# Patient Record
Sex: MALE | Race: WHITE | NOT HISPANIC OR LATINO | Employment: FULL TIME | URBAN - METROPOLITAN AREA
[De-identification: names, ages, dates, MRNs, and addresses within clinical notes are randomized per-mention and may not be internally consistent; named-entity substitution may affect disease eponyms.]

---

## 2017-02-09 ENCOUNTER — OFFICE VISIT (OUTPATIENT)
Dept: INTERNAL MEDICINE | Facility: CLINIC | Age: 57
End: 2017-02-09

## 2017-02-09 VITALS
HEART RATE: 74 BPM | SYSTOLIC BLOOD PRESSURE: 128 MMHG | DIASTOLIC BLOOD PRESSURE: 80 MMHG | BODY MASS INDEX: 25.71 KG/M2 | HEIGHT: 73 IN | OXYGEN SATURATION: 98 % | WEIGHT: 194 LBS

## 2017-02-09 DIAGNOSIS — N52.8 OTHER MALE ERECTILE DYSFUNCTION: ICD-10-CM

## 2017-02-09 DIAGNOSIS — Z00.00 ANNUAL PHYSICAL EXAM: ICD-10-CM

## 2017-02-09 DIAGNOSIS — Z12.11 SCREEN FOR COLON CANCER: ICD-10-CM

## 2017-02-09 DIAGNOSIS — E78.2 MIXED HYPERLIPIDEMIA: ICD-10-CM

## 2017-02-09 DIAGNOSIS — E11.9 DIABETES MELLITUS WITHOUT COMPLICATION (HCC): Primary | ICD-10-CM

## 2017-02-09 LAB
ALBUMIN SERPL-MCNC: 4.9 G/DL (ref 3.2–4.8)
ALBUMIN/GLOB SERPL: 1.8 G/DL (ref 1.5–2.5)
ALP SERPL-CCNC: 112 U/L (ref 25–100)
ALT SERPL W P-5'-P-CCNC: 57 U/L (ref 7–40)
ANION GAP SERPL CALCULATED.3IONS-SCNC: 5 MMOL/L (ref 3–11)
ARTICHOKE IGE QN: 127 MG/DL (ref 0–130)
AST SERPL-CCNC: 45 U/L (ref 0–33)
BILIRUB BLD-MCNC: NEGATIVE MG/DL
BILIRUB SERPL-MCNC: 0.8 MG/DL (ref 0.3–1.2)
BUN BLD-MCNC: 14 MG/DL (ref 9–23)
BUN/CREAT SERPL: 14 (ref 7–25)
CALCIUM SPEC-SCNC: 10.7 MG/DL (ref 8.7–10.4)
CHLORIDE SERPL-SCNC: 102 MMOL/L (ref 99–109)
CHOLEST SERPL-MCNC: 200 MG/DL (ref 0–200)
CLARITY, POC: CLEAR
CO2 SERPL-SCNC: 32 MMOL/L (ref 20–31)
COLOR UR: YELLOW
CREAT BLD-MCNC: 1 MG/DL (ref 0.6–1.3)
DEPRECATED RDW RBC AUTO: 41 FL (ref 37–54)
ERYTHROCYTE [DISTWIDTH] IN BLOOD BY AUTOMATED COUNT: 12.3 % (ref 11.3–14.5)
GFR SERPL CREATININE-BSD FRML MDRD: 77 ML/MIN/1.73
GLOBULIN UR ELPH-MCNC: 2.8 GM/DL
GLUCOSE BLD-MCNC: 166 MG/DL (ref 70–100)
GLUCOSE BLDC GLUCOMTR-MCNC: 182 MG/DL (ref 70–130)
GLUCOSE UR STRIP-MCNC: ABNORMAL MG/DL
HBA1C MFR BLD: 8.9 %
HCT VFR BLD AUTO: 48.9 % (ref 38.9–50.9)
HCV AB SER DONR QL: NORMAL
HDLC SERPL-MCNC: 50 MG/DL (ref 40–60)
HGB BLD-MCNC: 16.6 G/DL (ref 13.1–17.5)
KETONES UR QL: NEGATIVE
LEUKOCYTE EST, POC: NEGATIVE
MCH RBC QN AUTO: 30.9 PG (ref 27–31)
MCHC RBC AUTO-ENTMCNC: 33.9 G/DL (ref 32–36)
MCV RBC AUTO: 91.1 FL (ref 80–99)
NITRITE UR-MCNC: NEGATIVE MG/ML
PH UR: 6 [PH] (ref 5–8)
PLATELET # BLD AUTO: 262 10*3/MM3 (ref 150–450)
PMV BLD AUTO: 10.1 FL (ref 6–12)
POTASSIUM BLD-SCNC: 4.7 MMOL/L (ref 3.5–5.5)
PROT SERPL-MCNC: 7.7 G/DL (ref 5.7–8.2)
PROT UR STRIP-MCNC: NEGATIVE MG/DL
PSA SERPL-MCNC: 0.7 NG/ML (ref 0–4)
RBC # BLD AUTO: 5.37 10*6/MM3 (ref 4.2–5.76)
RBC # UR STRIP: NEGATIVE /UL
SODIUM BLD-SCNC: 139 MMOL/L (ref 132–146)
SP GR UR: 1.03 (ref 1–1.03)
TRIGL SERPL-MCNC: 211 MG/DL (ref 0–150)
TSH SERPL DL<=0.05 MIU/L-ACNC: 1.36 MIU/ML (ref 0.35–5.35)
UROBILINOGEN UR QL: NORMAL
WBC NRBC COR # BLD: 8.58 10*3/MM3 (ref 3.5–10.8)

## 2017-02-09 PROCEDURE — 81003 URINALYSIS AUTO W/O SCOPE: CPT | Performed by: HOSPITALIST

## 2017-02-09 PROCEDURE — 85027 COMPLETE CBC AUTOMATED: CPT | Performed by: HOSPITALIST

## 2017-02-09 PROCEDURE — G0103 PSA SCREENING: HCPCS | Performed by: HOSPITALIST

## 2017-02-09 PROCEDURE — 99396 PREV VISIT EST AGE 40-64: CPT | Performed by: HOSPITALIST

## 2017-02-09 PROCEDURE — 83036 HEMOGLOBIN GLYCOSYLATED A1C: CPT | Performed by: HOSPITALIST

## 2017-02-09 PROCEDURE — 84443 ASSAY THYROID STIM HORMONE: CPT | Performed by: HOSPITALIST

## 2017-02-09 PROCEDURE — 80061 LIPID PANEL: CPT | Performed by: HOSPITALIST

## 2017-02-09 PROCEDURE — 90471 IMMUNIZATION ADMIN: CPT | Performed by: HOSPITALIST

## 2017-02-09 PROCEDURE — 84402 ASSAY OF FREE TESTOSTERONE: CPT | Performed by: HOSPITALIST

## 2017-02-09 PROCEDURE — 90472 IMMUNIZATION ADMIN EACH ADD: CPT | Performed by: HOSPITALIST

## 2017-02-09 PROCEDURE — 86803 HEPATITIS C AB TEST: CPT | Performed by: HOSPITALIST

## 2017-02-09 PROCEDURE — 90732 PPSV23 VACC 2 YRS+ SUBQ/IM: CPT | Performed by: HOSPITALIST

## 2017-02-09 PROCEDURE — 84403 ASSAY OF TOTAL TESTOSTERONE: CPT | Performed by: HOSPITALIST

## 2017-02-09 PROCEDURE — 90715 TDAP VACCINE 7 YRS/> IM: CPT | Performed by: HOSPITALIST

## 2017-02-09 PROCEDURE — 82947 ASSAY GLUCOSE BLOOD QUANT: CPT | Performed by: HOSPITALIST

## 2017-02-09 PROCEDURE — 80053 COMPREHEN METABOLIC PANEL: CPT | Performed by: HOSPITALIST

## 2017-02-09 RX ORDER — PIOGLITAZONEHYDROCHLORIDE 30 MG/1
30 TABLET ORAL DAILY
Qty: 30 TABLET | Refills: 5 | Status: SHIPPED | OUTPATIENT
Start: 2017-02-09 | End: 2017-12-21 | Stop reason: SDUPTHER

## 2017-02-09 RX ORDER — GLIPIZIDE 10 MG/1
10 TABLET, FILM COATED, EXTENDED RELEASE ORAL DAILY
Qty: 30 TABLET | Refills: 11 | Status: SHIPPED | OUTPATIENT
Start: 2017-02-09 | End: 2018-04-02 | Stop reason: SDUPTHER

## 2017-02-09 NOTE — PROGRESS NOTES
Patient Care Team:  Karyn Quiñones MD as PCP - General    Ceasar Hawk 56 y.o. male who presents for an Annual Physical.  Ceasar Hawk has a history of   Patient Active Problem List   Diagnosis   • Diabetes mellitus without complication   • Mixed hyperlipidemia   .  Ceasar Hawk has been doing well without new interval problems. Plan to update vaccines if needed today. Also followed up on his diabetes.    Subjective   Last visit he was prescribed Actos but it apparently never got filled. He was not aware of why. He is about the same. He has waxing an waning energy levels. He is a little low right now. He also reports some erectile dysfunction. Denies neuropathy symptoms. He has not gotten a lot of time to exercise but he hasn't been eating as much and his weight is down. He endorses a mild to mod amout of stress. Denies sleep disturbances, chest pain or shortness of breath. He wakes up with a sinus headaches at times but not in the last few days.     Review of Systems   Pertinent items are noted in HPI, all other systems reviewed and negative    History  History reviewed. No pertinent past medical history.    Objective     Vital Signs  Vitals:    02/09/17 0751   BP: 128/80   Pulse: 74   SpO2: 98%         Physical Exam:      General Appearance:    Alert, cooperative, in no acute distress   Head:    Normocephalic, without obvious abnormality, atraumatic   Eyes:            Lids and lashes normal, conjunctivae and sclerae normal, no   icterus, no pallor, corneas clear, PERRLA   Ears:    Ears appear intact with no abnormalities noted   Throat:   No oral lesions, no thrush, oral mucosa moist   Neck:   No adenopathy, supple, trachea midline, no thyromegaly, no   carotid bruit, no JVD   Back:     No kyphosis present, no scoliosis present, no skin lesions,      erythema or scars, no tenderness to percussion or                   palpation,   range of motion normal   Lungs:     Clear to auscultation,respirations regular,  even and                  unlabored    Heart:    Regular rhythm and normal rate, normal S1 and S2, no            murmur, no gallop, no rub, no click   Chest Wall:    No abnormalities observed   Abdomen:     Normal bowel sounds, no masses, no organomegaly, soft        non-tender, non-distended, no guarding, no rebound                tenderness   Rectal:    prostate normal , non-tender, heme + stool   Extremities:   Moves all extremities well, no edema, no cyanosis, no             redness   Pulses:   Pulses palpable and equal bilaterally   Skin:   No bleeding, bruising or rash   Lymph nodes:   No palpable adenopathy   Neurologic:   Cranial nerves 2 - 12 grossly intact, sensation intact, DTR       present and equal bilaterally     Current Outpatient Prescriptions:   •  glipiZIDE (GLUCOTROL) 10 MG 24 hr tablet, Take 1 tablet by mouth Daily., Disp: 30 tablet, Rfl: 11  •  Linagliptin-Metformin HCl (JENTADUETO) 2.5-1000 MG tablet, Take 1 tablet by mouth 2 (Two) Times a Day., Disp: 60 tablet, Rfl: 11  •  lovastatin (MEVACOR) 40 MG tablet, Take 1 tablet by mouth Every Night., Disp: 30 tablet, Rfl: 2  •  pioglitazone (ACTOS) 30 MG tablet, Take 1 tablet by mouth Daily., Disp: 30 tablet, Rfl: 5     Results Review:    I reviewed the patient's new clinical results:  Lab Results (most recent)     Procedure Component Value Units Date/Time    POC Glucose Fingerstick [[id number]]  (Abnormal) Collected:  02/09/17 0821    Specimen:  Blood Updated:  02/09/17 0821     Glucose 182 (A) mg/dL     POC Glycosylated Hemoglobin (Hb A1C) [[id number]] Collected:  02/09/17 0821    Specimen:  Blood Updated:  02/09/17 0821     Hemoglobin A1C 8.9 %     POC Urinalysis Dipstick, Automated [[id number]]  (Abnormal) Collected:  02/09/17 0828    Specimen:  Urine Updated:  02/09/17 0829     Color Yellow      Clarity, UA Clear      Glucose, UA 50 mg/dL (A) mg/dL      Bilirubin Negative      Ketones, UA Negative      Specific Gravity  1.030      Blood, UA Negative       pH, Urine 6.0      Protein, POC Negative mg/dL      Urobilinogen, UA Normal      Leukocytes Negative      Nitrite, UA Negative             Ceasar was seen today for annual exam and diabetes mellitus without complication.    Diagnoses and all orders for this visit:    Diabetes mellitus without complication  -     POC Glycosylated Hemoglobin (Hb A1C)  -     POC Glucose Fingerstick  -     POC Urinalysis Dipstick, Automated  -     Comprehensive Metabolic Panel  -     pioglitazone (ACTOS) 30 MG tablet; Take 1 tablet by mouth Daily.  -     Linagliptin-Metformin HCl (JENTADUETO) 2.5-1000 MG tablet; Take 1 tablet by mouth 2 (Two) Times a Day.  -     glipiZIDE (GLUCOTROL) 10 MG 24 hr tablet; Take 1 tablet by mouth Daily.  -     POC Microalbumin    Mixed hyperlipidemia  -     Lipid Panel  -     TSH    Annual physical exam  -     CBC (No Diff)  -     PSA Screen  -     Cancel: Cologuard  -     Hepatitis C Antibody  -     Tdap Vaccine Greater Than or Equal To 6yo IM  -     Pneumococcal Polysaccharide Vaccine 23-Valent Greater Than or Equal To 3yo Subcutaneous / IM    Other male erectile dysfunction  -     Testosterone, Free, Total    Screen for colon cancer  -     Ambulatory Referral For Screening Colonoscopy    He was counseled on exercise and diet, stress management    Patient has declined colonoscopy many times in the past but since his rectal exam is heme positive today he has agreed to under colonoscopy.      I discussed the patients findings and my recommendations with patient.

## 2017-02-10 LAB
CONV COMMENT: NORMAL
TESTOST FREE SERPL-MCNC: 8.8 PG/ML (ref 7.2–24)
TESTOST SERPL-MCNC: 427 NG/DL (ref 348–1197)

## 2017-03-10 RX ORDER — LOVASTATIN 40 MG/1
TABLET ORAL
Qty: 30 TABLET | Refills: 2 | Status: SHIPPED | OUTPATIENT
Start: 2017-03-10 | End: 2017-06-08 | Stop reason: SDUPTHER

## 2017-06-08 RX ORDER — LOVASTATIN 40 MG/1
TABLET ORAL
Qty: 30 TABLET | Refills: 2 | Status: SHIPPED | OUTPATIENT
Start: 2017-06-08 | End: 2017-09-10 | Stop reason: SDUPTHER

## 2017-06-22 ENCOUNTER — OFFICE VISIT (OUTPATIENT)
Dept: INTERNAL MEDICINE | Facility: CLINIC | Age: 57
End: 2017-06-22

## 2017-06-22 VITALS
SYSTOLIC BLOOD PRESSURE: 124 MMHG | WEIGHT: 194 LBS | DIASTOLIC BLOOD PRESSURE: 80 MMHG | HEART RATE: 90 BPM | BODY MASS INDEX: 25.6 KG/M2 | OXYGEN SATURATION: 98 %

## 2017-06-22 DIAGNOSIS — E11.9 DIABETES MELLITUS WITHOUT COMPLICATION (HCC): Primary | ICD-10-CM

## 2017-06-22 LAB
GLUCOSE BLDC GLUCOMTR-MCNC: 96 MG/DL (ref 70–130)
HBA1C MFR BLD: 6.9 %

## 2017-06-22 PROCEDURE — 99213 OFFICE O/P EST LOW 20 MIN: CPT | Performed by: NURSE PRACTITIONER

## 2017-06-22 PROCEDURE — 82947 ASSAY GLUCOSE BLOOD QUANT: CPT | Performed by: NURSE PRACTITIONER

## 2017-06-22 PROCEDURE — 83036 HEMOGLOBIN GLYCOSYLATED A1C: CPT | Performed by: NURSE PRACTITIONER

## 2017-06-22 NOTE — PROGRESS NOTES
Subjective  Follow-up (Type 2 DM )      Ceasar Hawk is a 56 y.o. male.   No Known Allergies  History of Present Illness      Here for diabetes follow up, he does take his glucose qd, he walks a lot at work but has no exercise program , does still eat sweets daily  The following portions of the patient's history were reviewed and updated as appropriate: allergies, current medications, past family history, past medical history, past social history, past surgical history and problem list.    Review of Systems   Genitourinary: Negative.    Musculoskeletal: Negative.    Skin: Negative.    All other systems reviewed and are negative.      Objective   Physical Exam   Constitutional: He is oriented to person, place, and time. He appears well-developed.   HENT:   Head: Normocephalic.   Eyes: Pupils are equal, round, and reactive to light.   Cardiovascular: Normal rate, regular rhythm and normal heart sounds.    Pulmonary/Chest: Effort normal and breath sounds normal.   Neurological: He is alert and oriented to person, place, and time.   Skin: Skin is warm and dry.   Psychiatric: He has a normal mood and affect. His behavior is normal. Judgment and thought content normal.     /80  Pulse 90  Wt 194 lb (88 kg)  SpO2 98%  BMI 25.6 kg/m2    Assessment/Plan     Problem List Items Addressed This Visit        Endocrine    Diabetes mellitus without complication - Primary    Relevant Orders    POC Glycosylated Hemoglobin (Hb A1C) (Completed)    POCT Glucose (Completed)          Results for orders placed or performed in visit on 06/22/17   POC Glycosylated Hemoglobin (Hb A1C)   Result Value Ref Range    Hemoglobin A1C 6.9 %   POCT Glucose   Result Value Ref Range    Glucose 96 70 - 130 mg/dL   his a1c is down from last visit , he is to decrease sweet intake

## 2017-08-09 DIAGNOSIS — E11.9 DIABETES MELLITUS WITHOUT COMPLICATION (HCC): ICD-10-CM

## 2017-08-10 RX ORDER — PIOGLITAZONEHYDROCHLORIDE 30 MG/1
TABLET ORAL
Qty: 30 TABLET | Refills: 5 | OUTPATIENT
Start: 2017-08-10

## 2017-09-10 RX ORDER — LOVASTATIN 40 MG/1
TABLET ORAL
Qty: 30 TABLET | Refills: 2 | Status: SHIPPED | OUTPATIENT
Start: 2017-09-10 | End: 2017-12-12 | Stop reason: SDUPTHER

## 2017-09-22 ENCOUNTER — OFFICE VISIT (OUTPATIENT)
Dept: INTERNAL MEDICINE | Facility: CLINIC | Age: 57
End: 2017-09-22

## 2017-09-22 VITALS
DIASTOLIC BLOOD PRESSURE: 82 MMHG | HEART RATE: 57 BPM | SYSTOLIC BLOOD PRESSURE: 126 MMHG | WEIGHT: 209 LBS | BODY MASS INDEX: 27.57 KG/M2 | OXYGEN SATURATION: 98 %

## 2017-09-22 DIAGNOSIS — E11.9 DIABETES MELLITUS WITHOUT COMPLICATION (HCC): Primary | ICD-10-CM

## 2017-09-22 LAB
GLUCOSE BLDC GLUCOMTR-MCNC: 131 MG/DL (ref 70–130)
HBA1C MFR BLD: 7.1 %

## 2017-09-22 PROCEDURE — 83036 HEMOGLOBIN GLYCOSYLATED A1C: CPT | Performed by: NURSE PRACTITIONER

## 2017-09-22 PROCEDURE — 90686 IIV4 VACC NO PRSV 0.5 ML IM: CPT | Performed by: NURSE PRACTITIONER

## 2017-09-22 PROCEDURE — 99213 OFFICE O/P EST LOW 20 MIN: CPT | Performed by: NURSE PRACTITIONER

## 2017-09-22 PROCEDURE — 90471 IMMUNIZATION ADMIN: CPT | Performed by: NURSE PRACTITIONER

## 2017-09-22 PROCEDURE — 82947 ASSAY GLUCOSE BLOOD QUANT: CPT | Performed by: NURSE PRACTITIONER

## 2017-09-22 NOTE — ASSESSMENT & PLAN NOTE
Diabetes is stable.   Continue current treatment regimen.  Regular aerobic exercise.  Discussed foot care.  Diabetes will be reassessed in 3 months.

## 2017-09-22 NOTE — PROGRESS NOTES
Subjective  Follow-up (diabetes)      Ceasar Hawk is a 56 y.o. male.   No Known Allergies  History of Present Illness      Has not been taking meds as ordered, takes intermittently   The following portions of the patient's history were reviewed and updated as appropriate: allergies, past medical history and past surgical history.    Review of Systems   Constitutional: Negative for fatigue.   Respiratory: Negative for apnea, chest tightness and shortness of breath.    Cardiovascular: Negative for chest pain, palpitations and leg swelling.   Skin: Negative for color change.   Psychiatric/Behavioral: The patient is not nervous/anxious.    All other systems reviewed and are negative.      Objective   Physical Exam   Constitutional: He is oriented to person, place, and time. He appears well-developed.   HENT:   Head: Normocephalic.   Eyes: Pupils are equal, round, and reactive to light.   Neck: Neck supple. No thyromegaly present.   Cardiovascular: Normal rate, regular rhythm and normal heart sounds.    Pulmonary/Chest: Effort normal and breath sounds normal.   Lymphadenopathy:     He has no cervical adenopathy.   Neurological: He is alert and oriented to person, place, and time.   Skin: Skin is warm and dry.   Psychiatric: He has a normal mood and affect. His behavior is normal. Judgment and thought content normal.     /82  Pulse 57  Wt 209 lb (94.8 kg)  SpO2 98%  BMI 27.57 kg/m2    Assessment/Plan     Problem List Items Addressed This Visit        Endocrine    Diabetes mellitus without complication - Primary     Diabetes is stable.   Continue current treatment regimen.  Regular aerobic exercise.  Discussed foot care.  Diabetes will be reassessed in 3 months.         Relevant Orders    POCT Glucose (Completed)    POC Glycosylated Hemoglobin (Hb A1C) (Completed)          Results for orders placed or performed in visit on 09/22/17   POCT Glucose   Result Value Ref Range    Glucose 131 (A) 70 - 130 mg/dL   POC  Glycosylated Hemoglobin (Hb A1C)   Result Value Ref Range    Hemoglobin A1C 7.1 %

## 2017-11-16 ENCOUNTER — OFFICE VISIT (OUTPATIENT)
Dept: INTERNAL MEDICINE | Facility: CLINIC | Age: 57
End: 2017-11-16

## 2017-11-16 VITALS
OXYGEN SATURATION: 99 % | HEART RATE: 98 BPM | BODY MASS INDEX: 27.31 KG/M2 | SYSTOLIC BLOOD PRESSURE: 114 MMHG | WEIGHT: 207 LBS | DIASTOLIC BLOOD PRESSURE: 68 MMHG

## 2017-11-16 DIAGNOSIS — M77.8 TENDINITIS OF LEFT TRICEPS: Primary | ICD-10-CM

## 2017-11-16 PROCEDURE — 99213 OFFICE O/P EST LOW 20 MIN: CPT | Performed by: NURSE PRACTITIONER

## 2017-11-16 RX ORDER — PREDNISONE 20 MG/1
TABLET ORAL
Qty: 19 TABLET | Refills: 0 | Status: SHIPPED | OUTPATIENT
Start: 2017-11-16 | End: 2017-12-21

## 2017-11-16 NOTE — PROGRESS NOTES
Subjective  Left shoudler pain x3 weeks      Ceasar Hawk is a 56 y.o. male.   No Known Allergies  History of Present Illness      Pain in left shoulder x 3 weeks , worse with movement , came on suddenly , hurts most of the time,, shooting pain and an ache , has tried advil w/o relief   The following portions of the patient's history were reviewed and updated as appropriate: current medications, past social history and problem list.    Review of Systems   Musculoskeletal: Positive for myalgias.   All other systems reviewed and are negative.      Objective   Physical Exam   Constitutional: He is oriented to person, place, and time. He appears well-developed and well-nourished.   HENT:   Head: Normocephalic and atraumatic.   Eyes: Conjunctivae are normal.   Cardiovascular: Normal rate.    Pulmonary/Chest: Effort normal and breath sounds normal.   Musculoskeletal:        Left upper arm: He exhibits tenderness.   Neurological: He is alert and oriented to person, place, and time.   Skin: Skin is warm and dry.   Psychiatric: He has a normal mood and affect. His behavior is normal. Judgment and thought content normal.   Nursing note and vitals reviewed.    /68  Pulse 98  Wt 207 lb (93.9 kg)  SpO2 99%  BMI 27.31 kg/m2    Assessment/Plan     Problem List Items Addressed This Visit     None      Visit Diagnoses     Tendinitis of left triceps    -  Primary    Relevant Medications    predniSONE (DELTASONE) 20 MG tablet          rtc if no improvement

## 2017-12-12 RX ORDER — LOVASTATIN 40 MG/1
TABLET ORAL
Qty: 30 TABLET | Refills: 2 | Status: SHIPPED | OUTPATIENT
Start: 2017-12-12 | End: 2018-03-21 | Stop reason: SDUPTHER

## 2017-12-21 ENCOUNTER — OFFICE VISIT (OUTPATIENT)
Dept: INTERNAL MEDICINE | Facility: CLINIC | Age: 57
End: 2017-12-21

## 2017-12-21 VITALS
SYSTOLIC BLOOD PRESSURE: 110 MMHG | HEIGHT: 73 IN | OXYGEN SATURATION: 99 % | WEIGHT: 202.6 LBS | DIASTOLIC BLOOD PRESSURE: 66 MMHG | HEART RATE: 109 BPM | BODY MASS INDEX: 26.85 KG/M2

## 2017-12-21 DIAGNOSIS — E11.9 DIABETES MELLITUS WITHOUT COMPLICATION (HCC): ICD-10-CM

## 2017-12-21 LAB
GLUCOSE BLDC GLUCOMTR-MCNC: 265 MG/DL (ref 70–130)
HBA1C MFR BLD: 8.3 %

## 2017-12-21 PROCEDURE — 83036 HEMOGLOBIN GLYCOSYLATED A1C: CPT | Performed by: NURSE PRACTITIONER

## 2017-12-21 PROCEDURE — 99213 OFFICE O/P EST LOW 20 MIN: CPT | Performed by: NURSE PRACTITIONER

## 2017-12-21 PROCEDURE — 82962 GLUCOSE BLOOD TEST: CPT | Performed by: NURSE PRACTITIONER

## 2017-12-21 RX ORDER — PIOGLITAZONEHYDROCHLORIDE 30 MG/1
30 TABLET ORAL DAILY
Qty: 30 TABLET | Refills: 6 | Status: SHIPPED | OUTPATIENT
Start: 2017-12-21 | End: 2018-07-20 | Stop reason: SDUPTHER

## 2017-12-21 NOTE — PROGRESS NOTES
"Subjective  /66  Pulse 109  Ht 185.4 cm (73\")  Wt 91.9 kg (202 lb 9.6 oz)  SpO2 99%  BMI 26.73 kg/m2  Diabetes   Ceasar Hawk is a 56 y.o. male.   No Known Allergies  History of Present Illness      Joined a basketball league , is playing 3rd game tonight , it will go every tuesday and Thursday   Has been eating a lot of candy his mom and customers make for him   The following portions of the patient's history were reviewed and updated as appropriate: allergies, current medications, past medical history and problem list.    Review of Systems   Eyes: Negative.    Endocrine: Negative.    Genitourinary: Negative.    All other systems reviewed and are negative.      Objective   Physical Exam   Constitutional: He is oriented to person, place, and time. He appears well-developed and well-nourished.   HENT:   Head: Normocephalic and atraumatic.   Eyes: Conjunctivae are normal.   Neck: Neck supple. No thyromegaly present.   Cardiovascular: Normal rate and regular rhythm.    Pulmonary/Chest: Effort normal and breath sounds normal.   Lymphadenopathy:     He has no cervical adenopathy.   Neurological: He is alert and oriented to person, place, and time.   Skin: Skin is warm and dry.   Psychiatric: He has a normal mood and affect. His behavior is normal. Judgment and thought content normal.   Nursing note and vitals reviewed.      Assessment/Plan     Problem List Items Addressed This Visit        Endocrine    Diabetes mellitus without complication    Relevant Medications    pioglitazone (ACTOS) 30 MG tablet    Other Relevant Orders    POC Glycosylated Hemoglobin (Hb A1C) (Completed)    POC Glucose (Completed)        Educated about healthy lifestyle and food changes , states now that he is exercising again he will start eating healthier        Results for orders placed or performed in visit on 12/21/17   POC Glycosylated Hemoglobin (Hb A1C)   Result Value Ref Range    Hemoglobin A1C 8.3 %   POC Glucose   Result Value " Ref Range    Glucose 265 (A) 70 - 130 mg/dL

## 2018-03-21 ENCOUNTER — OFFICE VISIT (OUTPATIENT)
Dept: INTERNAL MEDICINE | Facility: CLINIC | Age: 58
End: 2018-03-21

## 2018-03-21 VITALS
HEART RATE: 95 BPM | SYSTOLIC BLOOD PRESSURE: 112 MMHG | OXYGEN SATURATION: 98 % | BODY MASS INDEX: 27.7 KG/M2 | WEIGHT: 209 LBS | HEIGHT: 73 IN | DIASTOLIC BLOOD PRESSURE: 72 MMHG

## 2018-03-21 DIAGNOSIS — Z00.00 ROUTINE ADULT HEALTH MAINTENANCE: Primary | ICD-10-CM

## 2018-03-21 DIAGNOSIS — Z12.5 ENCOUNTER FOR PROSTATE CANCER SCREENING: ICD-10-CM

## 2018-03-21 DIAGNOSIS — E11.9 DIABETES MELLITUS WITHOUT COMPLICATION (HCC): ICD-10-CM

## 2018-03-21 LAB
ALBUMIN SERPL-MCNC: 4.7 G/DL (ref 3.2–4.8)
ALBUMIN/GLOB SERPL: 1.7 G/DL (ref 1.5–2.5)
ALP SERPL-CCNC: 122 U/L (ref 25–100)
ALT SERPL W P-5'-P-CCNC: 58 U/L (ref 7–40)
ANION GAP SERPL CALCULATED.3IONS-SCNC: 12 MMOL/L (ref 3–11)
ARTICHOKE IGE QN: 144 MG/DL (ref 0–130)
AST SERPL-CCNC: 36 U/L (ref 0–33)
BILIRUB SERPL-MCNC: 0.8 MG/DL (ref 0.3–1.2)
BUN BLD-MCNC: 13 MG/DL (ref 9–23)
BUN/CREAT SERPL: 11.8 (ref 7–25)
CALCIUM SPEC-SCNC: 9.8 MG/DL (ref 8.7–10.4)
CHLORIDE SERPL-SCNC: 102 MMOL/L (ref 99–109)
CHOLEST SERPL-MCNC: 193 MG/DL (ref 0–200)
CO2 SERPL-SCNC: 28 MMOL/L (ref 20–31)
CREAT BLD-MCNC: 1.1 MG/DL (ref 0.6–1.3)
DEPRECATED RDW RBC AUTO: 41.5 FL (ref 37–54)
ERYTHROCYTE [DISTWIDTH] IN BLOOD BY AUTOMATED COUNT: 12.4 % (ref 11.3–14.5)
GFR SERPL CREATININE-BSD FRML MDRD: 69 ML/MIN/1.73
GLOBULIN UR ELPH-MCNC: 2.8 GM/DL
GLUCOSE BLD-MCNC: 142 MG/DL (ref 70–100)
GLUCOSE BLDC GLUCOMTR-MCNC: 134 MG/DL (ref 70–130)
HBA1C MFR BLD: 7.8 %
HCT VFR BLD AUTO: 48.6 % (ref 38.9–50.9)
HDLC SERPL-MCNC: 45 MG/DL (ref 40–60)
HGB BLD-MCNC: 16.3 G/DL (ref 13.1–17.5)
MCH RBC QN AUTO: 30.8 PG (ref 27–31)
MCHC RBC AUTO-ENTMCNC: 33.5 G/DL (ref 32–36)
MCV RBC AUTO: 91.7 FL (ref 80–99)
PLATELET # BLD AUTO: 264 10*3/MM3 (ref 150–450)
PMV BLD AUTO: 10.2 FL (ref 6–12)
POTASSIUM BLD-SCNC: 4.5 MMOL/L (ref 3.5–5.5)
PROT SERPL-MCNC: 7.5 G/DL (ref 5.7–8.2)
RBC # BLD AUTO: 5.3 10*6/MM3 (ref 4.2–5.76)
SODIUM BLD-SCNC: 142 MMOL/L (ref 132–146)
TRIGL SERPL-MCNC: 191 MG/DL (ref 0–150)
TSH SERPL DL<=0.05 MIU/L-ACNC: 0.99 MIU/ML (ref 0.35–5.35)
WBC NRBC COR # BLD: 8.41 10*3/MM3 (ref 3.5–10.8)

## 2018-03-21 PROCEDURE — 82043 UR ALBUMIN QUANTITATIVE: CPT | Performed by: NURSE PRACTITIONER

## 2018-03-21 PROCEDURE — 80061 LIPID PANEL: CPT | Performed by: NURSE PRACTITIONER

## 2018-03-21 PROCEDURE — 85027 COMPLETE CBC AUTOMATED: CPT | Performed by: NURSE PRACTITIONER

## 2018-03-21 PROCEDURE — 83036 HEMOGLOBIN GLYCOSYLATED A1C: CPT | Performed by: NURSE PRACTITIONER

## 2018-03-21 PROCEDURE — 80053 COMPREHEN METABOLIC PANEL: CPT | Performed by: NURSE PRACTITIONER

## 2018-03-21 PROCEDURE — 84443 ASSAY THYROID STIM HORMONE: CPT | Performed by: NURSE PRACTITIONER

## 2018-03-21 PROCEDURE — 99396 PREV VISIT EST AGE 40-64: CPT | Performed by: NURSE PRACTITIONER

## 2018-03-21 PROCEDURE — 82947 ASSAY GLUCOSE BLOOD QUANT: CPT | Performed by: NURSE PRACTITIONER

## 2018-03-21 PROCEDURE — 82570 ASSAY OF URINE CREATININE: CPT | Performed by: NURSE PRACTITIONER

## 2018-03-21 PROCEDURE — G0103 PSA SCREENING: HCPCS | Performed by: NURSE PRACTITIONER

## 2018-03-21 RX ORDER — LOVASTATIN 40 MG/1
TABLET ORAL
Qty: 30 TABLET | Refills: 2 | Status: SHIPPED | OUTPATIENT
Start: 2018-03-21 | End: 2018-04-02 | Stop reason: SDUPTHER

## 2018-03-21 NOTE — PROGRESS NOTES
Subjective  Annual Exam      Ceasar Hawk is a 57 y.o. male.   No Known Allergies  History of Present Illness      No complaints today, pt reports he does not always take his jentadueto , he takes if his b/s over 140  The following portions of the patient's history were reviewed and updated as appropriate: allergies, current medications, past surgical history and problem list.    Review of Systems   Eyes: Negative.    Respiratory: Negative.    Cardiovascular: Negative.    All other systems reviewed and are negative.      Objective   Physical Exam   Constitutional: He is oriented to person, place, and time. He appears well-developed and well-nourished. No distress.   HENT:   Head: Normocephalic and atraumatic. Hair is normal.   Right Ear: Hearing, tympanic membrane, external ear and ear canal normal.   Left Ear: Hearing, tympanic membrane, external ear and ear canal normal.   Nose: No sinus tenderness or nasal deformity.   Mouth/Throat: Uvula is midline, oropharynx is clear and moist and mucous membranes are normal. No oral lesions. No uvula swelling.   Eyes: Conjunctivae, EOM and lids are normal. Pupils are equal, round, and reactive to light. Right eye exhibits no discharge. Left eye exhibits no discharge. No scleral icterus. Right eye exhibits normal extraocular motion and no nystagmus. Left eye exhibits normal extraocular motion and no nystagmus.   Fundoscopic exam:       The right eye shows red reflex.        The left eye shows red reflex.   Neck: Normal range of motion. Neck supple. No JVD present. No tracheal deviation present. No thyromegaly present.   Cardiovascular: Normal rate, regular rhythm, normal heart sounds, intact distal pulses and normal pulses.  Exam reveals no gallop.    No murmur heard.  Pulmonary/Chest: Effort normal and breath sounds normal. No respiratory distress. He has no wheezes. He has no rales. He exhibits no tenderness.   Abdominal: Soft. Bowel sounds are normal. He exhibits no  "distension and no mass. There is no tenderness. There is no guarding. No hernia.   Genitourinary: Rectum normal, prostate normal and penis normal. Rectal exam shows guaiac negative stool. No penile tenderness.   Musculoskeletal: Normal range of motion. He exhibits no edema, tenderness or deformity.   Lymphadenopathy:     He has no cervical adenopathy.   Neurological: He is alert and oriented to person, place, and time. He has normal reflexes. He displays normal reflexes. No cranial nerve deficit. He exhibits normal muscle tone. Coordination normal.   Skin: Skin is warm and dry. No rash noted. He is not diaphoretic.   Psychiatric: He has a normal mood and affect. His behavior is normal. Judgment and thought content normal.   Nursing note and vitals reviewed.    /72   Pulse 95   Ht 185.4 cm (73\")   Wt 94.8 kg (209 lb)   SpO2 98%   BMI 27.57 kg/m²     Assessment/Plan     Problem List Items Addressed This Visit        Endocrine    Diabetes mellitus without complication    Relevant Orders    POC Glycosylated Hemoglobin (Hb A1C) (Completed)    POCT Glucose (Completed)      Other Visit Diagnoses     Routine adult health maintenance    -  Primary    Relevant Orders    CBC (No Diff)    Comprehensive metabolic panel    TSH    Lipid Panel    Microalbumin / Creatinine Urine Ratio - Urine, Clean Catch    Encounter for prostate cancer screening        Relevant Orders    PSA SCREENING              Counseled patient regarding multimodal approach with healthy nutrition, healthy sleep, regular physical activity, social activities, counseling, and medications.  Reviewed medication , we have talked about his a1c today and the need for him to take all of his meds as prescribed, pt agrees, will see him back in 3 mos, labs today, will review and send to pt with poc  "

## 2018-03-22 LAB
CREAT 24H UR-MCNC: 152.8 MG/DL
MICROALBUMIN UR-MCNC: 3.7 UG/ML
MICROALBUMIN/CREAT UR: 2.4 MG/G CREAT (ref 0–30)
PSA SERPL-MCNC: 0.88 NG/ML (ref 0–4)

## 2018-03-23 ENCOUNTER — TELEPHONE (OUTPATIENT)
Dept: INTERNAL MEDICINE | Facility: CLINIC | Age: 58
End: 2018-03-23

## 2018-04-02 DIAGNOSIS — E11.9 DIABETES MELLITUS WITHOUT COMPLICATION (HCC): ICD-10-CM

## 2018-04-02 RX ORDER — LOVASTATIN 40 MG/1
40 TABLET ORAL
Qty: 30 TABLET | Refills: 2 | Status: SHIPPED | OUTPATIENT
Start: 2018-04-02 | End: 2018-09-24 | Stop reason: SDUPTHER

## 2018-04-03 RX ORDER — GLIPIZIDE 10 MG/1
10 TABLET, FILM COATED, EXTENDED RELEASE ORAL DAILY
Qty: 30 TABLET | Refills: 11 | Status: SHIPPED | OUTPATIENT
Start: 2018-04-03 | End: 2018-09-24 | Stop reason: DRUGHIGH

## 2018-04-05 DIAGNOSIS — E11.9 DIABETES MELLITUS WITHOUT COMPLICATION (HCC): ICD-10-CM

## 2018-04-09 NOTE — TELEPHONE ENCOUNTER
AFTER SPEAKING WITH HIS PHARMACY, THE PATIENT WAS ADVISED TO CALL THIS OFFICE TO REQUEST MED REFILL FOR JENTADUETO. HE STATES THAT WITH [facility], THERE ARE ISSUES WITH SOME RX GOING THROUIGH BUT A PHONE CALL TO THE PHARMACY SHOULD SUFFICE.

## 2018-06-22 ENCOUNTER — OFFICE VISIT (OUTPATIENT)
Dept: INTERNAL MEDICINE | Facility: CLINIC | Age: 58
End: 2018-06-22

## 2018-06-22 ENCOUNTER — RESULTS ENCOUNTER (OUTPATIENT)
Dept: INTERNAL MEDICINE | Facility: CLINIC | Age: 58
End: 2018-06-22

## 2018-06-22 VITALS
BODY MASS INDEX: 27.83 KG/M2 | DIASTOLIC BLOOD PRESSURE: 76 MMHG | HEIGHT: 73 IN | OXYGEN SATURATION: 97 % | SYSTOLIC BLOOD PRESSURE: 118 MMHG | WEIGHT: 210 LBS | HEART RATE: 79 BPM

## 2018-06-22 DIAGNOSIS — Z12.11 SCREENING FOR COLON CANCER: ICD-10-CM

## 2018-06-22 DIAGNOSIS — E11.9 DIABETES MELLITUS WITHOUT COMPLICATION (HCC): Primary | ICD-10-CM

## 2018-06-22 PROCEDURE — 99214 OFFICE O/P EST MOD 30 MIN: CPT | Performed by: NURSE PRACTITIONER

## 2018-06-22 PROCEDURE — 83036 HEMOGLOBIN GLYCOSYLATED A1C: CPT | Performed by: NURSE PRACTITIONER

## 2018-06-22 PROCEDURE — 82947 ASSAY GLUCOSE BLOOD QUANT: CPT | Performed by: NURSE PRACTITIONER

## 2018-06-22 NOTE — PROGRESS NOTES
"Subjective   Ceasar Hawk is a 57 y.o. male.   Chief Complaint   Patient presents with   • Follow-up   • Diabetes     type 2      History of Present Illness As above.  Denies any problems,  BG averages 120-130  Checks BG q 2-3 days.  No sores or wounds not healing.  Last eye exam 6-9 months ago.  Doing OK on diet.  Denies daily HA, ear pain, sore throat.  Has cough about this time each year RT sinus.  No CP, fast or irreg heartbeats, abd pain, heartburn, blood in stool.  No bladder issues.  Mood good.  No joint or muscle pain (recent shoulder pain-now resolved.  Sometimes will have leg cramps at night-relieved with standing.     The following portions of the patient's history were reviewed and updated as appropriate: allergies, current medications, past family history, past medical history, past social history, past surgical history and problem list.    Current Outpatient Prescriptions:   •  glipiZIDE (GLUCOTROL XL) 10 MG 24 hr tablet, Take 1 tablet by mouth Daily., Disp: 30 tablet, Rfl: 11  •  Linagliptin-Metformin HCl (JENTADUETO) 2.5-1000 MG tablet, Take 1 tablet by mouth 2 (Two) Times a Day., Disp: 60 tablet, Rfl: 11  •  lovastatin (MEVACOR) 40 MG tablet, Take 1 tablet by mouth every night at bedtime., Disp: 30 tablet, Rfl: 2  •  pioglitazone (ACTOS) 30 MG tablet, Take 1 tablet by mouth Daily., Disp: 30 tablet, Rfl: 6    Review of Systems Consitutional, HEENT, Respiratory, CV, GI, , Skin, Musculoskeletal, Neuro-mental, Endocrinological, Hematological were reviewed.  Positives were discussed in the HPI, otherwise ROS was negative   /76   Pulse 79   Ht 185.4 cm (73\")   Wt 95.3 kg (210 lb)   SpO2 97%   BMI 27.71 kg/m²     Objective   No Known Allergies    Physical Exam   Constitutional: He is oriented to person, place, and time. He appears well-developed and well-nourished. No distress.   HENT:   Head: Normocephalic and atraumatic.   Right Ear: External ear normal.   Left Ear: External ear normal. "   Mouth/Throat: Oropharynx is clear and moist.   Eyes: Right eye exhibits no discharge. Left eye exhibits no discharge.   Neck: Neck supple.   No carotid bruit   Cardiovascular: Normal rate, regular rhythm, normal heart sounds and intact distal pulses.  Exam reveals no gallop and no friction rub.    No murmur heard.  Pulmonary/Chest: Effort normal and breath sounds normal. No respiratory distress.   Abdominal: Soft. There is no tenderness.   Musculoskeletal:   Gait steady   Lymphadenopathy:     He has no cervical adenopathy.   Neurological: He is alert and oriented to person, place, and time.   Skin: Skin is warm and dry.   Pink, no rash   Nursing note and vitals reviewed.      Procedures    Assessment/Plan   Ceasar was seen today for follow-up and diabetes.    Diagnoses and all orders for this visit:    Diabetes mellitus without complication  -     POC Glycosylated Hemoglobin (Hb A1C)  -     POC Glucose Fingerstick  -     Comprehensive metabolic panel; Future    Screening for colon cancer  -     Cologuard - Stool, Per Rectum; Future      Patient Instructions   Cont to do well.  Annual flu vaccine.  Cont FU with endo.  Consider adding ACE inhibitor at next visit for nephroprotection.  See you in 6 months, sooner if needed             Teresa Marcus, AMBAR

## 2018-06-23 NOTE — PATIENT INSTRUCTIONS
Cont to do well.  Annual flu vaccine.  Cont FU with endo.  Consider adding ACE inhibitor at next visit for nephroprotection.  See you in 6 months, sooner if needed

## 2018-06-25 LAB
GLUCOSE BLDC GLUCOMTR-MCNC: 206 MG/DL (ref 70–130)
HBA1C MFR BLD: 6.5 %

## 2018-07-20 DIAGNOSIS — E11.9 DIABETES MELLITUS WITHOUT COMPLICATION (HCC): ICD-10-CM

## 2018-07-20 RX ORDER — PIOGLITAZONEHYDROCHLORIDE 30 MG/1
TABLET ORAL
Qty: 30 TABLET | Refills: 6 | Status: CANCELLED | OUTPATIENT
Start: 2018-07-20

## 2018-07-20 RX ORDER — PIOGLITAZONEHYDROCHLORIDE 30 MG/1
30 TABLET ORAL DAILY
Qty: 30 TABLET | Refills: 6 | Status: SHIPPED | OUTPATIENT
Start: 2018-07-20 | End: 2018-09-24 | Stop reason: SDUPTHER

## 2018-09-21 ENCOUNTER — OFFICE VISIT (OUTPATIENT)
Dept: INTERNAL MEDICINE | Facility: CLINIC | Age: 58
End: 2018-09-21

## 2018-09-21 VITALS
OXYGEN SATURATION: 99 % | HEART RATE: 77 BPM | WEIGHT: 205 LBS | BODY MASS INDEX: 27.17 KG/M2 | HEIGHT: 73 IN | DIASTOLIC BLOOD PRESSURE: 74 MMHG | SYSTOLIC BLOOD PRESSURE: 118 MMHG

## 2018-09-21 DIAGNOSIS — E78.5 HYPERLIPIDEMIA, UNSPECIFIED HYPERLIPIDEMIA TYPE: ICD-10-CM

## 2018-09-21 DIAGNOSIS — E11.9 DIABETES MELLITUS WITHOUT COMPLICATION (HCC): Primary | ICD-10-CM

## 2018-09-21 LAB
ALBUMIN SERPL-MCNC: 4.91 G/DL (ref 3.2–4.8)
ALBUMIN/GLOB SERPL: 2 G/DL (ref 1.5–2.5)
ALP SERPL-CCNC: 117 U/L (ref 25–100)
ALT SERPL W P-5'-P-CCNC: 58 U/L (ref 7–40)
ANION GAP SERPL CALCULATED.3IONS-SCNC: 14 MMOL/L (ref 3–11)
AST SERPL-CCNC: 44 U/L (ref 0–33)
BILIRUB SERPL-MCNC: 0.9 MG/DL (ref 0.3–1.2)
BUN BLD-MCNC: 14 MG/DL (ref 9–23)
BUN/CREAT SERPL: 13.1 (ref 7–25)
CALCIUM SPEC-SCNC: 10.1 MG/DL (ref 8.7–10.4)
CHLORIDE SERPL-SCNC: 103 MMOL/L (ref 99–109)
CO2 SERPL-SCNC: 26 MMOL/L (ref 20–31)
CREAT BLD-MCNC: 1.07 MG/DL (ref 0.6–1.3)
GFR SERPL CREATININE-BSD FRML MDRD: 71 ML/MIN/1.73
GLOBULIN UR ELPH-MCNC: 2.5 GM/DL
GLUCOSE BLD-MCNC: 101 MG/DL (ref 70–100)
HBA1C MFR BLD: 7.3 %
POTASSIUM BLD-SCNC: 5 MMOL/L (ref 3.5–5.5)
PROT SERPL-MCNC: 7.4 G/DL (ref 5.7–8.2)
SODIUM BLD-SCNC: 143 MMOL/L (ref 132–146)

## 2018-09-21 PROCEDURE — 80053 COMPREHEN METABOLIC PANEL: CPT | Performed by: NURSE PRACTITIONER

## 2018-09-21 PROCEDURE — 83036 HEMOGLOBIN GLYCOSYLATED A1C: CPT | Performed by: NURSE PRACTITIONER

## 2018-09-21 PROCEDURE — 99214 OFFICE O/P EST MOD 30 MIN: CPT | Performed by: NURSE PRACTITIONER

## 2018-09-21 NOTE — PROGRESS NOTES
"Subjective   Ceasar Hawk is a 57 y.o. male.   Chief Complaint   Patient presents with   • Follow-up     3 month   • Diabetes      History of Present Illness Patient denies fever chills, headache, ear pain, sore throat, shortness of air, cough, chest pain, abdominal pain, nausea vomiting diarrhea, dysuria, blood in stool or urine. Mood is good.  Eating and drinking as usual.  Stays tired.  No sores or wounds not healing.  Checks BG 1-2 times daily .  Not taking Jentadueto consistently  related to hypoglycemia.  He reports his blood sugars are staying less than 150.  Denies sores or wounds that will not heal.    The following portions of the patient's history were reviewed and updated as appropriate: allergies, current medications, past family history, past medical history, past social history, past surgical history and problem list.    Current Outpatient Prescriptions:   •  Linagliptin-Metformin HCl (JENTADUETO) 2.5-1000 MG tablet, Take 1 tablet by mouth 2 (Two) Times a Day., Disp: 60 tablet, Rfl: 2  •  lovastatin (MEVACOR) 40 MG tablet, Take 1 tablet by mouth every night at bedtime., Disp: 30 tablet, Rfl: 2  •  glipiZIDE (GLUCOTROL XL) 5 MG ER tablet, Take 1 tablet by mouth Daily., Disp: 30 tablet, Rfl: 2  •  pioglitazone (ACTOS) 30 MG tablet, Take 1 tablet by mouth Daily., Disp: 30 tablet, Rfl: 2    Review of Systems Consitutional, HEENT, Respiratory, CV, GI, , Skin, Musculoskeletal, Neuro-mental, Endocrinological, Hematological were reviewed.  Positives were discussed in the HPI, otherwise ROS was negative   /74   Pulse 77   Ht 185.4 cm (73\")   Wt 93 kg (205 lb)   SpO2 99%   BMI 27.05 kg/m²     Objective   No Known Allergies    Physical Exam   Constitutional: He is oriented to person, place, and time. He appears well-developed and well-nourished. No distress.   HENT:   Head: Normocephalic.   Right Ear: External ear normal.   Left Ear: External ear normal.   Nose: Nose normal.   Mouth/Throat: " Oropharynx is clear and moist. No oropharyngeal exudate.   Eyes: Pupils are equal, round, and reactive to light. Right eye exhibits no discharge. Left eye exhibits no discharge. No scleral icterus.   Neck: Neck supple.   No carotid bruit   Cardiovascular: Normal rate, regular rhythm, normal heart sounds and intact distal pulses.  Exam reveals no gallop and no friction rub.    No murmur heard.  Pulmonary/Chest: Effort normal and breath sounds normal. No respiratory distress. He has no wheezes. He has no rales. He exhibits no tenderness.   Abdominal: Soft. Bowel sounds are normal. He exhibits no mass. There is no tenderness.   Musculoskeletal:   OCHOA well    Lymphadenopathy:     He has no cervical adenopathy.   Neurological: He is alert and oriented to person, place, and time.   Skin: Skin is warm and dry. Capillary refill takes less than 2 seconds.   Psychiatric: He has a normal mood and affect. His behavior is normal. Judgment and thought content normal.   Nursing note and vitals reviewed.      Procedures    Assessment/Plan   Ceasar was seen today for follow-up and diabetes.    Diagnoses and all orders for this visit:    Diabetes mellitus without complication (CMS/[facility])  -     POC Glycosylated Hemoglobin (Hb A1C)  -     Comprehensive metabolic panel  -     glipiZIDE (GLUCOTROL XL) 5 MG ER tablet; Take 1 tablet by mouth Daily.  -     Linagliptin-Metformin HCl (JENTADUETO) 2.5-1000 MG tablet; Take 1 tablet by mouth 2 (Two) Times a Day.  -     pioglitazone (ACTOS) 30 MG tablet; Take 1 tablet by mouth Daily.    Hyperlipidemia, unspecified hyperlipidemia type  -     lovastatin (MEVACOR) 40 MG tablet; Take 1 tablet by mouth every night at bedtime.    Other orders  -     Cancel: Comprehensive metabolic panel        Patient Instructions   Reminded of seen an eye exam.  Recommend decrease the Glucotrol XL 10 mg to one half tablet daily.  Resume the Jentadueto .  Continue other medications as discussed.  Laboratory studies as  discussed.  Return to the clinic in 3 months to assess response to decreasing the Glucotrol XL        AMBAR Flores

## 2018-09-24 DIAGNOSIS — R79.89 ELEVATED LFTS: Primary | ICD-10-CM

## 2018-09-24 RX ORDER — LOVASTATIN 40 MG/1
40 TABLET ORAL
Qty: 30 TABLET | Refills: 2 | Status: SHIPPED | OUTPATIENT
Start: 2018-09-24 | End: 2018-12-21 | Stop reason: SDUPTHER

## 2018-09-24 RX ORDER — PIOGLITAZONEHYDROCHLORIDE 30 MG/1
30 TABLET ORAL DAILY
Qty: 30 TABLET | Refills: 2 | Status: SHIPPED | OUTPATIENT
Start: 2018-09-24 | End: 2018-12-21

## 2018-09-24 RX ORDER — GLIPIZIDE 5 MG/1
5 TABLET, FILM COATED, EXTENDED RELEASE ORAL DAILY
Qty: 30 TABLET | Refills: 2 | Status: SHIPPED | OUTPATIENT
Start: 2018-09-24 | End: 2018-12-21 | Stop reason: SDUPTHER

## 2018-09-24 NOTE — PATIENT INSTRUCTIONS
Reminded of seen an eye exam.  Recommend decrease the Glucotrol XL 10 mg to one half tablet daily.  Resume the Jentadueto .  Continue other medications as discussed.  Laboratory studies as discussed.  Return to the clinic in 3 months to assess response to decreasing the Glucotrol XL

## 2018-12-21 ENCOUNTER — OFFICE VISIT (OUTPATIENT)
Dept: INTERNAL MEDICINE | Facility: CLINIC | Age: 58
End: 2018-12-21

## 2018-12-21 VITALS
HEIGHT: 73 IN | WEIGHT: 210.2 LBS | SYSTOLIC BLOOD PRESSURE: 104 MMHG | DIASTOLIC BLOOD PRESSURE: 64 MMHG | BODY MASS INDEX: 27.86 KG/M2 | TEMPERATURE: 97.9 F | HEART RATE: 76 BPM

## 2018-12-21 DIAGNOSIS — E11.9 DIABETES MELLITUS WITHOUT COMPLICATION (HCC): ICD-10-CM

## 2018-12-21 DIAGNOSIS — E78.5 HYPERLIPIDEMIA, UNSPECIFIED HYPERLIPIDEMIA TYPE: ICD-10-CM

## 2018-12-21 DIAGNOSIS — R74.8 ELEVATED LIVER ENZYMES: Primary | ICD-10-CM

## 2018-12-21 DIAGNOSIS — E11.65 TYPE 2 DIABETES MELLITUS WITH HYPERGLYCEMIA, WITHOUT LONG-TERM CURRENT USE OF INSULIN (HCC): ICD-10-CM

## 2018-12-21 LAB
ALBUMIN SERPL-MCNC: 4.48 G/DL (ref 3.2–4.8)
ALBUMIN/GLOB SERPL: 1.9 G/DL (ref 1.5–2.5)
ALP SERPL-CCNC: 96 U/L (ref 25–100)
ALT SERPL W P-5'-P-CCNC: 59 U/L (ref 7–40)
ANION GAP SERPL CALCULATED.3IONS-SCNC: 9 MMOL/L (ref 3–11)
AST SERPL-CCNC: 44 U/L (ref 0–33)
BILIRUB SERPL-MCNC: 0.9 MG/DL (ref 0.3–1.2)
BUN BLD-MCNC: 14 MG/DL (ref 9–23)
BUN/CREAT SERPL: 14.6 (ref 7–25)
CALCIUM SPEC-SCNC: 9.2 MG/DL (ref 8.7–10.4)
CHLORIDE SERPL-SCNC: 104 MMOL/L (ref 99–109)
CO2 SERPL-SCNC: 24 MMOL/L (ref 20–31)
CREAT BLD-MCNC: 0.96 MG/DL (ref 0.6–1.3)
GFR SERPL CREATININE-BSD FRML MDRD: 80 ML/MIN/1.73
GLOBULIN UR ELPH-MCNC: 2.3 GM/DL
GLUCOSE BLD-MCNC: 162 MG/DL (ref 70–100)
POTASSIUM BLD-SCNC: 4.6 MMOL/L (ref 3.5–5.5)
PROT SERPL-MCNC: 6.8 G/DL (ref 5.7–8.2)
SODIUM BLD-SCNC: 137 MMOL/L (ref 132–146)

## 2018-12-21 PROCEDURE — 90674 CCIIV4 VAC NO PRSV 0.5 ML IM: CPT | Performed by: NURSE PRACTITIONER

## 2018-12-21 PROCEDURE — 80053 COMPREHEN METABOLIC PANEL: CPT | Performed by: NURSE PRACTITIONER

## 2018-12-21 PROCEDURE — 99214 OFFICE O/P EST MOD 30 MIN: CPT | Performed by: NURSE PRACTITIONER

## 2018-12-21 PROCEDURE — 90471 IMMUNIZATION ADMIN: CPT | Performed by: NURSE PRACTITIONER

## 2018-12-21 RX ORDER — GLIPIZIDE 10 MG/1
10 TABLET, FILM COATED, EXTENDED RELEASE ORAL DAILY
Qty: 30 TABLET | Refills: 2 | Status: SHIPPED | OUTPATIENT
Start: 2018-12-21 | End: 2019-03-20 | Stop reason: SDUPTHER

## 2018-12-21 RX ORDER — LOVASTATIN 40 MG/1
40 TABLET ORAL
Qty: 30 TABLET | Refills: 2 | Status: SHIPPED | OUTPATIENT
Start: 2018-12-21 | End: 2019-06-21

## 2018-12-21 RX ORDER — GLIPIZIDE 5 MG/1
5 TABLET, FILM COATED, EXTENDED RELEASE ORAL DAILY
Qty: 30 TABLET | Refills: 2 | Status: SHIPPED | OUTPATIENT
Start: 2018-12-21 | End: 2018-12-21 | Stop reason: DRUGHIGH

## 2018-12-21 RX ORDER — LOVASTATIN 40 MG/1
40 TABLET ORAL
Qty: 30 TABLET | Refills: 2 | OUTPATIENT
Start: 2018-12-21

## 2018-12-21 NOTE — PROGRESS NOTES
"Subjective   Ceasar Hawk is a 58 y.o. male.   Chief Complaint   Patient presents with   • Follow-up      History of Present Illness Since last visit, has had increased work responsibilities and is not able to care (Exercise, dietary modifications)  for himself so well.  Patient denies fever chills.  Has headache once a week, ear pain, sore throat, shortness of air, cough, chest pain, abdominal pain, nausea, vomiting, diarrhea, dysuria, blood in stool or urine. Mood is good.  Eating and drinking as usual.  No unexplained weight loss or gain.  We have been holding Mevacor RT liver enzyme elevation.  Also, we had decreased Glipizide at last visit RT hypoglycemic episodes-no further since the decrease but HGBA1 C has gone up to 7.6     The following portions of the patient's history were reviewed and updated as appropriate: allergies, current medications, past family history, past medical history, past social history, past surgical history and problem list.    Current Outpatient Medications:   •  Linagliptin-Metformin HCl (JENTADUETO) 2.5-1000 MG tablet, Take 1 tablet by mouth 2 (Two) Times a Day., Disp: 60 tablet, Rfl: 2  •  lovastatin (MEVACOR) 40 MG tablet, Take 1 tablet by mouth every night at bedtime., Disp: 30 tablet, Rfl: 2  •  glipiZIDE (GLUCOTROL XL) 10 MG 24 hr tablet, Take 1 tablet by mouth Daily., Disp: 30 tablet, Rfl: 2    Review of Systems Consitutional, HEENT, Respiratory, CV, GI, , Skin, Musculoskeletal, Neuro-mental, Endocrinological, Hematological were reviewed.  Positives were discussed in the HPI, otherwise ROS was negative   /64   Pulse 76   Temp 97.9 °F (36.6 °C)   Ht 185.4 cm (73\")   Wt 95.3 kg (210 lb 3.2 oz)   BMI 27.73 kg/m²     Objective   No Known Allergies    Physical Exam   Constitutional: He is oriented to person, place, and time. He appears well-developed and well-nourished. No distress.   HENT:   Head: Normocephalic.   Right Ear: External ear normal.   Left Ear: External ear " normal.   Mouth/Throat: Oropharynx is clear and moist.   TM clear   Eyes: Right eye exhibits no discharge. Left eye exhibits no discharge.   Neck: Neck supple. No thyromegaly present.   No carotid bruit bilat   Cardiovascular: Normal rate, regular rhythm, normal heart sounds and intact distal pulses. Exam reveals no gallop and no friction rub.   No murmur heard.  Pulmonary/Chest: Effort normal and breath sounds normal. No stridor. No respiratory distress. He has no wheezes.   Abdominal: Soft. Bowel sounds are normal. He exhibits no mass. There is no tenderness.   No HSM   Musculoskeletal:   OCHOA well.  Gait upright and steady    Lymphadenopathy:     He has no cervical adenopathy.   Neurological: He is alert and oriented to person, place, and time.   Skin: Skin is warm and dry. Capillary refill takes less than 2 seconds.   Nursing note and vitals reviewed.      Procedures    LABS  Results for orders placed or performed in visit on 12/21/18   Comprehensive Metabolic Panel   Result Value Ref Range    Glucose 162 (H) 70 - 100 mg/dL    BUN 14 9 - 23 mg/dL    Creatinine 0.96 0.60 - 1.30 mg/dL    Sodium 137 132 - 146 mmol/L    Potassium 4.6 3.5 - 5.5 mmol/L    Chloride 104 99 - 109 mmol/L    CO2 24.0 20.0 - 31.0 mmol/L    Calcium 9.2 8.7 - 10.4 mg/dL    Total Protein 6.8 5.7 - 8.2 g/dL    Albumin 4.48 3.20 - 4.80 g/dL    ALT (SGPT) 59 (H) 7 - 40 U/L    AST (SGOT) 44 (H) 0 - 33 U/L    Alkaline Phosphatase 96 25 - 100 U/L    Total Bilirubin 0.9 0.3 - 1.2 mg/dL    eGFR Non African Amer 80 >60 mL/min/1.73    Globulin 2.3 gm/dL    A/G Ratio 1.9 1.5 - 2.5 g/dL    BUN/Creatinine Ratio 14.6 7.0 - 25.0    Anion Gap 9.0 3.0 - 11.0 mmol/L       Assessment/Plan   Ceasar was seen today for follow-up.    Diagnoses and all orders for this visit:    Elevated liver enzymes  -     Comprehensive Metabolic Panel    Type 2 diabetes mellitus with hyperglycemia, without long-term current use of insulin (CMS/[facility])  -     Comprehensive Metabolic  Panel    Diabetes mellitus without complication (CMS/HCC)  -     Linagliptin-Metformin HCl (JENTADUETO) 2.5-1000 MG tablet; Take 1 tablet by mouth 2 (Two) Times a Day.  -     Discontinue: glipiZIDE (GLUCOTROL XL) 5 MG ER tablet; Take 1 tablet by mouth Daily.  -     glipiZIDE (GLUCOTROL XL) 10 MG 24 hr tablet; Take 1 tablet by mouth Daily.    Hyperlipidemia, unspecified hyperlipidemia type  -     lovastatin (MEVACOR) 40 MG tablet; Take 1 tablet by mouth every night at bedtime.    Other orders  -     Flucelvax Quad=>4Years ([id number])        Patient Instructions   Resume Mevacor. We will check LFT in 3 months to document stabiltiy.  Condier RUQ US.   Cont same meds O/W.  Watch diet and exercise daily.  Pt verbalizes understanding and agreement with plan of care.       EMR Dragon/transcription disclaimer:  Please note that portions of this note were completed with a voice recognition program.  Electronic transcription of the voice recognition program may permit erroneous words or phrases to be inadvertently transcribed.  Although I have reviewed the note for such errors, some may still exist in this documentation       Teresa Marcus APRN

## 2018-12-24 NOTE — PATIENT INSTRUCTIONS
Resume Mevacor. We will check LFT in 3 months to document stabiltiy.  Condier RUQ US.   Cont same meds O/W.  Watch diet and exercise daily.  Pt verbalizes understanding and agreement with plan of care.

## 2018-12-26 DIAGNOSIS — R74.8 ELEVATED LIVER ENZYMES: Primary | ICD-10-CM

## 2019-01-18 ENCOUNTER — HOSPITAL ENCOUNTER (OUTPATIENT)
Dept: ULTRASOUND IMAGING | Facility: HOSPITAL | Age: 59
Discharge: HOME | End: 2019-01-18
Admitting: NURSE PRACTITIONER

## 2019-01-18 DIAGNOSIS — R74.8 ELEVATED LIVER ENZYMES: ICD-10-CM

## 2019-01-18 PROCEDURE — 76705 ECHO EXAM OF ABDOMEN: CPT

## 2019-03-20 DIAGNOSIS — E11.9 DIABETES MELLITUS WITHOUT COMPLICATION (HCC): ICD-10-CM

## 2019-03-20 RX ORDER — GLIPIZIDE 10 MG/1
TABLET, FILM COATED, EXTENDED RELEASE ORAL
Qty: 30 TABLET | Refills: 2 | Status: SHIPPED | OUTPATIENT
Start: 2019-03-20 | End: 2019-06-21 | Stop reason: SDUPTHER

## 2019-03-21 ENCOUNTER — OFFICE VISIT (OUTPATIENT)
Dept: INTERNAL MEDICINE | Facility: CLINIC | Age: 59
End: 2019-03-21

## 2019-03-21 VITALS
HEART RATE: 95 BPM | BODY MASS INDEX: 27.83 KG/M2 | WEIGHT: 210 LBS | OXYGEN SATURATION: 95 % | HEIGHT: 73 IN | SYSTOLIC BLOOD PRESSURE: 106 MMHG | DIASTOLIC BLOOD PRESSURE: 68 MMHG

## 2019-03-21 DIAGNOSIS — R74.8 ELEVATED LIVER ENZYMES: ICD-10-CM

## 2019-03-21 DIAGNOSIS — E11.9 DIABETES MELLITUS WITHOUT COMPLICATION (HCC): Primary | ICD-10-CM

## 2019-03-21 PROBLEM — K76.0 FATTY INFILTRATION OF LIVER: Status: ACTIVE | Noted: 2019-03-21

## 2019-03-21 LAB
ALBUMIN SERPL-MCNC: 4.7 G/DL (ref 3.2–4.8)
ALBUMIN/GLOB SERPL: 2 G/DL (ref 1.5–2.5)
ALP SERPL-CCNC: 122 U/L (ref 25–100)
ALT SERPL W P-5'-P-CCNC: 45 U/L (ref 7–40)
ANION GAP SERPL CALCULATED.3IONS-SCNC: 10 MMOL/L (ref 3–11)
AST SERPL-CCNC: 39 U/L (ref 0–33)
BILIRUB SERPL-MCNC: 0.8 MG/DL (ref 0.3–1.2)
BUN BLD-MCNC: 12 MG/DL (ref 9–23)
BUN/CREAT SERPL: 12.4 (ref 7–25)
CALCIUM SPEC-SCNC: 9.6 MG/DL (ref 8.7–10.4)
CHLORIDE SERPL-SCNC: 103 MMOL/L (ref 99–109)
CO2 SERPL-SCNC: 25 MMOL/L (ref 20–31)
CREAT BLD-MCNC: 0.97 MG/DL (ref 0.6–1.3)
GFR SERPL CREATININE-BSD FRML MDRD: 79 ML/MIN/1.73
GLOBULIN UR ELPH-MCNC: 2.3 GM/DL
GLUCOSE BLD-MCNC: 157 MG/DL (ref 70–100)
HBA1C MFR BLD: 8.2 %
POTASSIUM BLD-SCNC: 5.1 MMOL/L (ref 3.5–5.5)
PROT SERPL-MCNC: 7 G/DL (ref 5.7–8.2)
SODIUM BLD-SCNC: 138 MMOL/L (ref 132–146)

## 2019-03-21 PROCEDURE — 99214 OFFICE O/P EST MOD 30 MIN: CPT | Performed by: NURSE PRACTITIONER

## 2019-03-21 PROCEDURE — 83036 HEMOGLOBIN GLYCOSYLATED A1C: CPT | Performed by: NURSE PRACTITIONER

## 2019-03-21 PROCEDURE — 80053 COMPREHEN METABOLIC PANEL: CPT | Performed by: NURSE PRACTITIONER

## 2019-03-21 RX ORDER — PIOGLITAZONEHYDROCHLORIDE 30 MG/1
TABLET ORAL
COMMUNITY
Start: 2019-02-21 | End: 2019-06-21 | Stop reason: SDUPTHER

## 2019-03-21 NOTE — PATIENT INSTRUCTIONS
Hemoglobin A1c was 8.3.  I have requested patient see endocrinology however he wants to try taking his medicines regularly and watching his diet.  He will return to the clinic in 3 months for recheck.  Recommend diabetic eye exam.  Pt verbalizes understanding and agreement with plan of care.

## 2019-03-21 NOTE — PROGRESS NOTES
"Subjective   Ceasar Hawk is a 58 y.o. male.   Chief Complaint   Patient presents with   • Diabetes     Follow Up   • Hyperlipidemia      History of Present Illness Not check ing BG x 2 weeks.  Cont same med's except not taking regularly.  Patient denies fever chills, headache, ear pain, sore throat, shortness of air.  Has  Cough x2 weeks ago RT allergies.  Get cough same each .  No  wheezing, chest pain, abdominal pain, nausea, vomiting, diarrhea, dysuria, blood in stool or urine.  Mood is good.  PHQ-2 Depression Screen negative.  Eating and drinking as usual.  No unexplained weight loss or gain.      The following portions of the patient's history were reviewed and updated as appropriate: allergies, current medications, past family history, past medical history, past social history, past surgical history and problem list.    Current Outpatient Medications:   •  glipiZIDE (GLUCOTROL XL) 10 MG 24 hr tablet, take 1 tablet by mouth once daily, Disp: 30 tablet, Rfl: 2  •  Linagliptin-Metformin HCl (JENTADUETO) 2.5-1000 MG tablet, Take 1 tablet by mouth 2 (Two) Times a Day., Disp: 60 tablet, Rfl: 2  •  lovastatin (MEVACOR) 40 MG tablet, Take 1 tablet by mouth every night at bedtime., Disp: 30 tablet, Rfl: 2  •  pioglitazone (ACTOS) 30 MG tablet, , Disp: , Rfl:     Review of Systems Consitutional, HEENT, Respiratory, CV, GI, , Skin, Musculoskeletal, Neuro-mental, Endocrinological, Hematological were reviewed.  Positives were discussed in the HPI, otherwise ROS was negative   /68   Pulse 95   Ht 185.4 cm (73\")   Wt 95.3 kg (210 lb)   SpO2 95%   BMI 27.71 kg/m²     Objective   No Known Allergies    Physical Exam   Constitutional: He is oriented to person, place, and time. He appears well-developed and well-nourished.   HENT:   Head: Normocephalic.   Cardiovascular: Normal rate, regular rhythm, normal heart sounds and intact distal pulses. Exam reveals no gallop and no friction rub.   No murmur " heard.  Pulmonary/Chest: Effort normal and breath sounds normal. No stridor. No respiratory distress. He has no wheezes. He has no rales.   Abdominal: Soft. Bowel sounds are normal. He exhibits no mass. There is no tenderness.   Musculoskeletal:   OCHOA well.  Gait upright and steady    Neurological: He is alert and oriented to person, place, and time.   Skin: Skin is warm and dry. Capillary refill takes less than 2 seconds.   Is pink, no rash    Nursing note and vitals reviewed.      Procedures    LABS  Results for orders placed or performed in visit on 03/21/19   Comprehensive Metabolic Panel   Result Value Ref Range    Glucose 157 (H) 70 - 100 mg/dL    BUN 12 9 - 23 mg/dL    Creatinine 0.97 0.60 - 1.30 mg/dL    Sodium 138 132 - 146 mmol/L    Potassium 5.1 3.5 - 5.5 mmol/L    Chloride 103 99 - 109 mmol/L    CO2 25.0 20.0 - 31.0 mmol/L    Calcium 9.6 8.7 - 10.4 mg/dL    Total Protein 7.0 5.7 - 8.2 g/dL    Albumin 4.70 3.20 - 4.80 g/dL    ALT (SGPT) 45 (H) 7 - 40 U/L    AST (SGOT) 39 (H) 0 - 33 U/L    Alkaline Phosphatase 122 (H) 25 - 100 U/L    Total Bilirubin 0.8 0.3 - 1.2 mg/dL    eGFR Non African Amer 79 >60 mL/min/1.73    Globulin 2.3 gm/dL    A/G Ratio 2.0 1.5 - 2.5 g/dL    BUN/Creatinine Ratio 12.4 7.0 - 25.0    Anion Gap 10.0 3.0 - 11.0 mmol/L   POC Glycosylated Hemoglobin (Hb A1C)   Result Value Ref Range    Hemoglobin A1C 8.2 %       Assessment/Plan   Ceasar was seen today for diabetes and hyperlipidemia.    Diagnoses and all orders for this visit:    Diabetes mellitus without complication (CMS/HCC)  -     POC Glycosylated Hemoglobin (Hb A1C)  -     Cancel: Ambulatory Referral to Endocrinology  -     Comprehensive Metabolic Panel    Elevated liver enzymes  -     Comprehensive Metabolic Panel        Patient Instructions   Hemoglobin A1c was 8.3.  I have requested patient see endocrinology however he wants to try taking his medicines regularly and watching his diet.  He will return to the clinic in 3 months for  recheck.  Recommend diabetic eye exam.  Pt verbalizes understanding and agreement with plan of care.     EMR Dragon/transcription disclaimer:  Please note that portions of this note were completed with a voice recognition program.  Electronic transcription of the voice recognition program may permit erroneous words or phrases to be inadvertently transcribed.  Although I have reviewed the note for such errors, some may still exist in this documentation     Teresa Marcus, APRN

## 2019-05-17 DIAGNOSIS — E11.9 DIABETES MELLITUS WITHOUT COMPLICATION (HCC): ICD-10-CM

## 2019-05-17 RX ORDER — PIOGLITAZONEHYDROCHLORIDE 30 MG/1
TABLET ORAL
Qty: 30 TABLET | Refills: 2 | OUTPATIENT
Start: 2019-05-17

## 2019-06-21 ENCOUNTER — OFFICE VISIT (OUTPATIENT)
Dept: INTERNAL MEDICINE | Facility: CLINIC | Age: 59
End: 2019-06-21

## 2019-06-21 VITALS
WEIGHT: 207 LBS | BODY MASS INDEX: 27.43 KG/M2 | SYSTOLIC BLOOD PRESSURE: 114 MMHG | DIASTOLIC BLOOD PRESSURE: 72 MMHG | OXYGEN SATURATION: 96 % | HEART RATE: 83 BPM | RESPIRATION RATE: 18 BRPM | HEIGHT: 73 IN

## 2019-06-21 DIAGNOSIS — E11.65 TYPE 2 DIABETES MELLITUS WITH HYPERGLYCEMIA, WITHOUT LONG-TERM CURRENT USE OF INSULIN (HCC): Primary | ICD-10-CM

## 2019-06-21 DIAGNOSIS — E11.9 DIABETES MELLITUS WITHOUT COMPLICATION (HCC): ICD-10-CM

## 2019-06-21 DIAGNOSIS — E78.2 MIXED HYPERLIPIDEMIA: ICD-10-CM

## 2019-06-21 LAB
GLUCOSE BLDC GLUCOMTR-MCNC: 172 MG/DL (ref 70–130)
HBA1C MFR BLD: 9.2 %

## 2019-06-21 PROCEDURE — 82962 GLUCOSE BLOOD TEST: CPT | Performed by: NURSE PRACTITIONER

## 2019-06-21 PROCEDURE — 83036 HEMOGLOBIN GLYCOSYLATED A1C: CPT | Performed by: NURSE PRACTITIONER

## 2019-06-21 PROCEDURE — 99213 OFFICE O/P EST LOW 20 MIN: CPT | Performed by: NURSE PRACTITIONER

## 2019-06-21 RX ORDER — GLIPIZIDE 10 MG/1
10 TABLET, FILM COATED, EXTENDED RELEASE ORAL DAILY
Qty: 30 TABLET | Refills: 2 | Status: SHIPPED | OUTPATIENT
Start: 2019-06-21 | End: 2019-09-20 | Stop reason: SDUPTHER

## 2019-06-21 RX ORDER — PRAVASTATIN SODIUM 10 MG
10 TABLET ORAL DAILY
Qty: 30 TABLET | Refills: 2 | Status: SHIPPED | OUTPATIENT
Start: 2019-06-21 | End: 2019-12-06

## 2019-06-21 RX ORDER — PIOGLITAZONEHYDROCHLORIDE 30 MG/1
30 TABLET ORAL DAILY
Qty: 30 TABLET | Refills: 2 | Status: SHIPPED | OUTPATIENT
Start: 2019-06-21 | End: 2019-09-20 | Stop reason: SDUPTHER

## 2019-06-21 NOTE — PROGRESS NOTES
"Subjective   Ceasar Hawk is a 58 y.o. male.   Chief Complaint   Patient presents with   • Diabetes   • Med Refill      History of Present Illness patient states he is feeling well.  Staying busy.  Not exercising not watching diet.  Not regularly monitoring blood sugars.  Patient reports compliance with medications.  Patient denies fever chills, headache, ear pain, sore throat, shortness of air, cough, wheezing, chest pain, abdominal pain, nausea, vomiting, diarrhea, dysuria, blood in stool or urine.  Mood is good.  Eating and drinking as usual.  No unexplained weight loss or gain.      The following portions of the patient's history were reviewed and updated as appropriate: allergies, current medications, past family history, past medical history, past social history, past surgical history and problem list.    Current Outpatient Medications:   •  glipiZIDE (GLUCOTROL XL) 10 MG 24 hr tablet, Take 1 tablet by mouth Daily., Disp: 30 tablet, Rfl: 2  •  Linagliptin-Metformin HCl (JENTADUETO) 2.5-1000 MG tablet, Take 1 tablet by mouth 2 (Two) Times a Day., Disp: 60 tablet, Rfl: 2  •  pioglitazone (ACTOS) 30 MG tablet, Take 1 tablet by mouth Daily., Disp: 30 tablet, Rfl: 2  •  pravastatin (PRAVACHOL) 10 MG tablet, Take 1 tablet by mouth Daily., Disp: 30 tablet, Rfl: 2    Review of Systems Consitutional, HEENT, Respiratory, CV, GI, , Skin, Musculoskeletal, Neuro-mental, Endocrinological, Hematological were reviewed.  Positives were discussed in the HPI, otherwise ROS was negative   /72   Pulse 83   Resp 18   Ht 185.4 cm (73\")   Wt 93.9 kg (207 lb)   SpO2 96%   BMI 27.31 kg/m²     Objective   No Known Allergies    Physical Exam   Constitutional: He is oriented to person, place, and time. He appears well-developed and well-nourished. No distress.   HENT:   Head: Normocephalic.   Eyes: Right eye exhibits no discharge. Left eye exhibits no discharge. No scleral icterus.   Neck: Neck supple.   Cardiovascular: " Normal rate, regular rhythm, normal heart sounds and intact distal pulses. Exam reveals no gallop and no friction rub.   No murmur heard.  Pulmonary/Chest: Effort normal and breath sounds normal. No stridor. No respiratory distress. He has no wheezes. He has no rales.   Abdominal: Soft. Bowel sounds are normal. He exhibits no mass. There is no tenderness.   Musculoskeletal:   OCHOA well.  Gait upright and steady.  Feet are without breakdown, some calluses to plantar surface.  Sensation intact.   Lymphadenopathy:     He has no cervical adenopathy.   Neurological: He is alert and oriented to person, place, and time.   Skin: Skin is warm and dry. Capillary refill takes less than 2 seconds.   Nursing note and vitals reviewed.      Procedures    LABS  Results for orders placed or performed in visit on 06/21/19   POC Glycosylated Hemoglobin (Hb A1C)   Result Value Ref Range    Hemoglobin A1C 9.2 %   POCT Glucose   Result Value Ref Range    Glucose 172 (A) 70 - 130 mg/dL       Assessment/Plan   Ceasar was seen today for diabetes and med refill.    Diagnoses and all orders for this visit:    Type 2 diabetes mellitus with hyperglycemia, without long-term current use of insulin (CMS/[facility])  -     POC Glycosylated Hemoglobin (Hb A1C)  -     POCT Glucose  -     Cancel: Comprehensive Metabolic Panel  -     Cancel: Lipid Panel  -     Microalbumin / Creatinine Urine Ratio - Urine, Clean Catch; Future  -     Cancel: Microalbumin / Creatinine Urine Ratio - Urine, Clean Catch    Mixed hyperlipidemia  -     pravastatin (PRAVACHOL) 10 MG tablet; Take 1 tablet by mouth Daily.  -     Cancel: Comprehensive Metabolic Panel  -     Cancel: Lipid Panel    Diabetes mellitus without complication (CMS/[facility])  -     pioglitazone (ACTOS) 30 MG tablet; Take 1 tablet by mouth Daily.  -     glipiZIDE (GLUCOTROL XL) 10 MG 24 hr tablet; Take 1 tablet by mouth Daily.      Patient Instructions   Discussed with patient hemoglobin A1c of 9.2 indicates poorly  controlled diabetes.  Recommend endocrinology referral (we have got him scheduled October 3, 2019 this is the first available).  Recommend basal insulin.  Patient declines.  States he does not want to take shots.  Reminded of need for diabetic eye exam.  [place]  We will do annual physical at next visit including fasting labs.Pt verbalizes understanding and agreement with plan of care.       EMR Dragon/transcription disclaimer:  Please note that portions of this note were completed with a voice recognition program.  Electronic transcription of the voice recognition program may permit erroneous words or phrases to be inadvertently transcribed.  Although I have reviewed the note for such errors, some may still exist in this documentation     Teresa Marcus, APRN

## 2019-06-24 NOTE — PATIENT INSTRUCTIONS
Discussed with patient hemoglobin A1c of 9.2 indicates poorly controlled diabetes.  Recommend endocrinology referral (we have got him scheduled October 3, 2019 this is the first available).  Recommend basal insulin.  Patient declines.  States he does not want to take shots.  Reminded of need for diabetic eye exam.  Patient [place] eye exam [place] hometown.  We will do annual physical at next visit including fasting labs.Pt verbalizes understanding and agreement with plan of care.

## 2019-06-25 ENCOUNTER — TELEPHONE (OUTPATIENT)
Dept: INTERNAL MEDICINE | Facility: CLINIC | Age: 59
End: 2019-06-25

## 2019-06-25 DIAGNOSIS — E11.9 DIABETES MELLITUS WITHOUT COMPLICATION (HCC): Primary | ICD-10-CM

## 2019-09-20 DIAGNOSIS — E11.9 DIABETES MELLITUS WITHOUT COMPLICATION (HCC): ICD-10-CM

## 2019-09-20 RX ORDER — PIOGLITAZONEHYDROCHLORIDE 30 MG/1
TABLET ORAL
Qty: 30 TABLET | Refills: 2 | Status: SHIPPED | OUTPATIENT
Start: 2019-09-20 | End: 2020-03-09 | Stop reason: SDUPTHER

## 2019-09-20 RX ORDER — GLIPIZIDE 10 MG/1
TABLET, FILM COATED, EXTENDED RELEASE ORAL
Qty: 30 TABLET | Refills: 2 | Status: SHIPPED | OUTPATIENT
Start: 2019-09-20 | End: 2020-06-22 | Stop reason: SDUPTHER

## 2019-12-06 ENCOUNTER — OFFICE VISIT (OUTPATIENT)
Dept: INTERNAL MEDICINE | Facility: CLINIC | Age: 59
End: 2019-12-06

## 2019-12-06 VITALS
HEART RATE: 84 BPM | OXYGEN SATURATION: 96 % | BODY MASS INDEX: 27.81 KG/M2 | SYSTOLIC BLOOD PRESSURE: 100 MMHG | WEIGHT: 210.8 LBS | DIASTOLIC BLOOD PRESSURE: 74 MMHG

## 2019-12-06 DIAGNOSIS — E11.9 DIABETES MELLITUS WITHOUT COMPLICATION (HCC): Primary | ICD-10-CM

## 2019-12-06 DIAGNOSIS — E78.2 MIXED HYPERLIPIDEMIA: ICD-10-CM

## 2019-12-06 LAB
GLUCOSE BLDC GLUCOMTR-MCNC: 138 MG/DL (ref 70–130)
HBA1C MFR BLD: 7.9 %

## 2019-12-06 PROCEDURE — 82947 ASSAY GLUCOSE BLOOD QUANT: CPT | Performed by: PHYSICIAN ASSISTANT

## 2019-12-06 PROCEDURE — 99213 OFFICE O/P EST LOW 20 MIN: CPT | Performed by: PHYSICIAN ASSISTANT

## 2019-12-06 PROCEDURE — 90471 IMMUNIZATION ADMIN: CPT | Performed by: PHYSICIAN ASSISTANT

## 2019-12-06 PROCEDURE — 90686 IIV4 VACC NO PRSV 0.5 ML IM: CPT | Performed by: PHYSICIAN ASSISTANT

## 2019-12-06 PROCEDURE — 83036 HEMOGLOBIN GLYCOSYLATED A1C: CPT | Performed by: PHYSICIAN ASSISTANT

## 2019-12-06 RX ORDER — METFORMIN HYDROCHLORIDE EXTENDED-RELEASE TABLETS 1000 MG/1
1000 TABLET, FILM COATED, EXTENDED RELEASE ORAL
Qty: 30 TABLET | Refills: 5 | Status: SHIPPED | OUTPATIENT
Start: 2019-12-06 | End: 2020-06-22

## 2019-12-06 RX ORDER — ROSUVASTATIN CALCIUM 10 MG/1
10 TABLET, COATED ORAL DAILY
Qty: 30 TABLET | Refills: 5 | Status: SHIPPED | OUTPATIENT
Start: 2019-12-06 | End: 2020-03-09 | Stop reason: SDUPTHER

## 2019-12-06 NOTE — PROGRESS NOTES
Chief Complaint   Patient presents with   • Diabetes     LAST A1C 9.2   • Hyperlipidemia     FOLLOW UP       Subjective   Ceasar Hawk is a 58 y.o. male.       History of Present Illness     Pt has started taking jardiance and is staying more active with some changes at his work. Pt only takes jentadueto when his blood sugars are elevated. He checks his sugar 2-3 times a day and has found that when he takes the jentadueto his blood sugars drop too low. He previously     Pt did not continue the pravastatin after his prescription ran out. He has not tried any other statins.    Current Outpatient Medications:   •  Empagliflozin 10 MG tablet, Take 10 mg by mouth Daily., Disp: 30 tablet, Rfl: 2  •  glipiZIDE (GLUCOTROL XL) 10 MG 24 hr tablet, take 1 tablet by mouth once daily, Disp: 30 tablet, Rfl: 2  •  pioglitazone (ACTOS) 30 MG tablet, take 1 tablet by mouth once daily, Disp: 30 tablet, Rfl: 2  •  metFORMIN (FORTAMET) 1000 MG (OSM) 24 hr tablet, Take 1 tablet by mouth Daily With Breakfast., Disp: 30 tablet, Rfl: 5  •  rosuvastatin (CRESTOR) 10 MG tablet, Take 1 tablet by mouth Daily., Disp: 30 tablet, Rfl: 5     PMFSH  The following portions of the patient's history were reviewed and updated as appropriate: allergies, current medications, past family history, past medical history, past social history, past surgical history and problem list.    Review of Systems   Constitutional: Negative for activity change, appetite change and fatigue.   HENT: Negative for congestion and rhinorrhea.    Respiratory: Negative for chest tightness and shortness of breath.    Cardiovascular: Negative for chest pain and palpitations.   Gastrointestinal: Negative for abdominal pain.   Genitourinary: Negative for dysuria.   Musculoskeletal: Negative for arthralgias and myalgias.   Neurological: Negative for dizziness, weakness, light-headedness and headaches.   Psychiatric/Behavioral: Negative for dysphoric mood. The patient is not  nervous/anxious.        Objective   /74   Pulse 84   Wt 95.6 kg (210 lb 12.8 oz)   SpO2 96%   BMI 27.81 kg/m²     Physical Exam   Constitutional: He appears well-developed and well-nourished.   HENT:   Head: Normocephalic.   Right Ear: Hearing, tympanic membrane, external ear and ear canal normal.   Left Ear: Hearing, tympanic membrane, external ear and ear canal normal.   Nose: Nose normal.   Mouth/Throat: Oropharynx is clear and moist.   Eyes: Pupils are equal, round, and reactive to light. Conjunctivae are normal.   Neck: Normal range of motion.   Cardiovascular: Normal rate, regular rhythm and normal heart sounds.   Pulmonary/Chest: Effort normal and breath sounds normal. He has no decreased breath sounds. He has no wheezes. He has no rhonchi. He has no rales.   Musculoskeletal: Normal range of motion.   Neurological: He is alert.   Skin: Skin is warm and dry.   Psychiatric: He has a normal mood and affect. His behavior is normal.   Nursing note and vitals reviewed.      Results for orders placed or performed in visit on 12/06/19   POC Glycosylated Hemoglobin (Hb A1C)   Result Value Ref Range    Hemoglobin A1C 7.9 %   POC Glucose   Result Value Ref Range    Glucose 138 (A) 70 - 130 mg/dL        ASSESSMENT/PLAN    Problem List Items Addressed This Visit        Cardiovascular and Mediastinum    Mixed hyperlipidemia     Check lipid profile. Further recommendations based on results.           Relevant Medications    rosuvastatin (CRESTOR) 10 MG tablet       Endocrine    Diabetes mellitus without complication (CMS/HCC) - Primary     Diabetes is improving with treatment.   Continue current treatment regimen.  Reminded to bring in blood sugar diary at next visit.  Dietary recommendations for ADA diet.  Regular aerobic exercise.  Diabetes will be reassessed in 3 months.         Relevant Medications    metFORMIN (FORTAMET) 1000 MG (OSM) 24 hr tablet    Other Relevant Orders    POC Glycosylated Hemoglobin (Hb  A1C) (Completed)    POC Glucose (Completed)               Return in about 3 months (around 3/6/2020) for Annual with fasting labs.

## 2020-03-09 ENCOUNTER — OFFICE VISIT (OUTPATIENT)
Dept: INTERNAL MEDICINE | Facility: CLINIC | Age: 60
End: 2020-03-09

## 2020-03-09 ENCOUNTER — APPOINTMENT (OUTPATIENT)
Dept: LAB | Facility: HOSPITAL | Age: 60
End: 2020-03-09

## 2020-03-09 VITALS
SYSTOLIC BLOOD PRESSURE: 104 MMHG | HEART RATE: 96 BPM | DIASTOLIC BLOOD PRESSURE: 84 MMHG | BODY MASS INDEX: 27.17 KG/M2 | HEIGHT: 73 IN | WEIGHT: 205 LBS | OXYGEN SATURATION: 98 %

## 2020-03-09 DIAGNOSIS — E78.2 MIXED HYPERLIPIDEMIA: ICD-10-CM

## 2020-03-09 DIAGNOSIS — Z00.00 HEALTH CARE MAINTENANCE: Primary | ICD-10-CM

## 2020-03-09 DIAGNOSIS — E11.65 TYPE 2 DIABETES MELLITUS WITH HYPERGLYCEMIA, WITHOUT LONG-TERM CURRENT USE OF INSULIN (HCC): ICD-10-CM

## 2020-03-09 DIAGNOSIS — N52.9 ERECTILE DYSFUNCTION, UNSPECIFIED ERECTILE DYSFUNCTION TYPE: ICD-10-CM

## 2020-03-09 LAB
ALBUMIN SERPL-MCNC: 4.5 G/DL (ref 3.5–5.2)
ALBUMIN/GLOB SERPL: 1.5 G/DL
ALP SERPL-CCNC: 119 U/L (ref 39–117)
ALT SERPL W P-5'-P-CCNC: 26 U/L (ref 1–41)
ANION GAP SERPL CALCULATED.3IONS-SCNC: 12.6 MMOL/L (ref 5–15)
AST SERPL-CCNC: 20 U/L (ref 1–40)
BASOPHILS # BLD AUTO: 0.06 10*3/MM3 (ref 0–0.2)
BASOPHILS NFR BLD AUTO: 0.7 % (ref 0–1.5)
BILIRUB SERPL-MCNC: 0.6 MG/DL (ref 0.2–1.2)
BUN BLD-MCNC: 13 MG/DL (ref 8–23)
BUN/CREAT SERPL: 13.3 (ref 7–25)
CALCIUM SPEC-SCNC: 9.8 MG/DL (ref 8.6–10.5)
CHLORIDE SERPL-SCNC: 102 MMOL/L (ref 98–107)
CHOLEST SERPL-MCNC: 229 MG/DL (ref 0–200)
CO2 SERPL-SCNC: 25.4 MMOL/L (ref 22–29)
CREAT BLD-MCNC: 0.98 MG/DL (ref 0.76–1.27)
DEPRECATED RDW RBC AUTO: 38.9 FL (ref 37–54)
EOSINOPHIL # BLD AUTO: 0.22 10*3/MM3 (ref 0–0.4)
EOSINOPHIL NFR BLD AUTO: 2.4 % (ref 0.3–6.2)
ERYTHROCYTE [DISTWIDTH] IN BLOOD BY AUTOMATED COUNT: 12.1 % (ref 12.3–15.4)
GFR SERPL CREATININE-BSD FRML MDRD: 78 ML/MIN/1.73
GLOBULIN UR ELPH-MCNC: 3 GM/DL
GLUCOSE BLD-MCNC: 184 MG/DL (ref 65–99)
GLUCOSE BLDC GLUCOMTR-MCNC: 165 MG/DL (ref 70–130)
HBA1C MFR BLD: 10.3 %
HCT VFR BLD AUTO: 44.4 % (ref 37.5–51)
HDLC SERPL-MCNC: 42 MG/DL (ref 40–60)
HGB BLD-MCNC: 15.3 G/DL (ref 13–17.7)
IMM GRANULOCYTES # BLD AUTO: 0.06 10*3/MM3 (ref 0–0.05)
IMM GRANULOCYTES NFR BLD AUTO: 0.7 % (ref 0–0.5)
LDLC SERPL CALC-MCNC: 142 MG/DL (ref 0–100)
LDLC/HDLC SERPL: 3.38 {RATIO}
LYMPHOCYTES # BLD AUTO: 2.07 10*3/MM3 (ref 0.7–3.1)
LYMPHOCYTES NFR BLD AUTO: 22.8 % (ref 19.6–45.3)
MCH RBC QN AUTO: 30.7 PG (ref 26.6–33)
MCHC RBC AUTO-ENTMCNC: 34.5 G/DL (ref 31.5–35.7)
MCV RBC AUTO: 89.2 FL (ref 79–97)
MONOCYTES # BLD AUTO: 0.56 10*3/MM3 (ref 0.1–0.9)
MONOCYTES NFR BLD AUTO: 6.2 % (ref 5–12)
NEUTROPHILS # BLD AUTO: 6.11 10*3/MM3 (ref 1.7–7)
NEUTROPHILS NFR BLD AUTO: 67.2 % (ref 42.7–76)
NRBC BLD AUTO-RTO: 0 /100 WBC (ref 0–0.2)
PLATELET # BLD AUTO: 299 10*3/MM3 (ref 140–450)
PMV BLD AUTO: 10.3 FL (ref 6–12)
POTASSIUM BLD-SCNC: 4.1 MMOL/L (ref 3.5–5.2)
PROT SERPL-MCNC: 7.5 G/DL (ref 6–8.5)
PSA SERPL-MCNC: 1.57 NG/ML (ref 0–4)
RBC # BLD AUTO: 4.98 10*6/MM3 (ref 4.14–5.8)
SODIUM BLD-SCNC: 140 MMOL/L (ref 136–145)
TRIGL SERPL-MCNC: 225 MG/DL (ref 0–150)
TSH SERPL DL<=0.05 MIU/L-ACNC: 0.98 UIU/ML (ref 0.27–4.2)
VLDLC SERPL-MCNC: 45 MG/DL (ref 5–40)
WBC NRBC COR # BLD: 9.08 10*3/MM3 (ref 3.4–10.8)

## 2020-03-09 PROCEDURE — G0103 PSA SCREENING: HCPCS | Performed by: PHYSICIAN ASSISTANT

## 2020-03-09 PROCEDURE — 99213 OFFICE O/P EST LOW 20 MIN: CPT | Performed by: PHYSICIAN ASSISTANT

## 2020-03-09 PROCEDURE — 83036 HEMOGLOBIN GLYCOSYLATED A1C: CPT | Performed by: PHYSICIAN ASSISTANT

## 2020-03-09 PROCEDURE — 85025 COMPLETE CBC W/AUTO DIFF WBC: CPT | Performed by: PHYSICIAN ASSISTANT

## 2020-03-09 PROCEDURE — 82947 ASSAY GLUCOSE BLOOD QUANT: CPT | Performed by: PHYSICIAN ASSISTANT

## 2020-03-09 PROCEDURE — 84443 ASSAY THYROID STIM HORMONE: CPT | Performed by: PHYSICIAN ASSISTANT

## 2020-03-09 PROCEDURE — 99396 PREV VISIT EST AGE 40-64: CPT | Performed by: PHYSICIAN ASSISTANT

## 2020-03-09 PROCEDURE — 80053 COMPREHEN METABOLIC PANEL: CPT | Performed by: PHYSICIAN ASSISTANT

## 2020-03-09 PROCEDURE — 80061 LIPID PANEL: CPT | Performed by: PHYSICIAN ASSISTANT

## 2020-03-09 RX ORDER — ROSUVASTATIN CALCIUM 10 MG/1
10 TABLET, COATED ORAL DAILY
Qty: 90 TABLET | Refills: 1 | Status: SHIPPED | OUTPATIENT
Start: 2020-03-09 | End: 2020-03-17 | Stop reason: SDUPTHER

## 2020-03-09 RX ORDER — TADALAFIL 10 MG/1
10 TABLET ORAL DAILY PRN
Qty: 10 TABLET | Refills: 1 | Status: SHIPPED | OUTPATIENT
Start: 2020-03-09 | End: 2020-06-05

## 2020-03-09 RX ORDER — PIOGLITAZONEHYDROCHLORIDE 30 MG/1
30 TABLET ORAL DAILY
Qty: 90 TABLET | Refills: 1 | Status: SHIPPED | OUTPATIENT
Start: 2020-03-09 | End: 2020-06-22 | Stop reason: SDUPTHER

## 2020-03-09 NOTE — PROGRESS NOTES
"Chief Complaint   Patient presents with   • Annual Exam       Subjective   Ceasar Hawk is a 59 y.o. male.       History of Present Illness     The patient is being seen for a health maintenance evaluation.  The last health maintenance was unknown year(s) ago.    Social History  Ceasar  does not smoke cigarettes.   He drinks rare alcohol.  He does not use illicit drugs.    General History  Ceasar  does have regular dental visits.  He does complain of vision problems. Wears reading glasses. Last eye exam was 2018.  Immunizations are not up to date. The patient needs the following immunizations: shingrix recommended    Lifestyle  Ceasar  consumes a in general, an \"unhealthy\" diet.  He exercises rarely.    Reproductive Health  Ceasar  is not sexually active. His contraceptive plan is no method.   He does have erectile dysfunction.     Screening  Last PSA was 2018.  Last prostate exam was  2018. Family history of prostate cancer: none  Last testicular exam was 2018.   Last colonoscopy was cologuard in 2018. Family history of colon cancer: none  Other pertinent family history and/or screenings: none    Pt has been out of his medication for a couple of weeks- he was never contacted by in pharmacy about refills, he has not called to see if they are available. His blood sugars have been running around 170 when he checks it.                     Current Outpatient Medications:   •  Empagliflozin 10 MG tablet, Take 10 mg by mouth Daily., Disp: 90 tablet, Rfl: 1  •  glipiZIDE (GLUCOTROL XL) 10 MG 24 hr tablet, take 1 tablet by mouth once daily, Disp: 30 tablet, Rfl: 2  •  metFORMIN (FORTAMET) 1000 MG (OSM) 24 hr tablet, Take 1 tablet by mouth Daily With Breakfast., Disp: 30 tablet, Rfl: 5  •  pioglitazone (ACTOS) 30 MG tablet, Take 1 tablet by mouth Daily., Disp: 90 tablet, Rfl: 1  •  rosuvastatin (CRESTOR) 10 MG tablet, Take 1 tablet by mouth Daily., Disp: 90 tablet, Rfl: 1  •  tadalafil (CIALIS) 10 MG tablet, Take 1 tablet by " "mouth Daily As Needed for Erectile Dysfunction. Take 1 hour prior to activity., Disp: 10 tablet, Rfl: 1     PMFSH  The following portions of the patient's history were reviewed and updated as appropriate: allergies, current medications, past family history, past medical history, past social history, past surgical history and problem list.    Review of Systems   Constitutional: Negative for appetite change, fever and unexpected weight change.   HENT: Negative for ear pain, facial swelling and sore throat.    Eyes: Negative for pain and visual disturbance.   Respiratory: Negative for chest tightness, shortness of breath and wheezing.    Cardiovascular: Negative for chest pain and palpitations.   Gastrointestinal: Negative for abdominal pain and blood in stool.   Endocrine: Negative.    Genitourinary: Negative for difficulty urinating and hematuria.   Musculoskeletal: Negative for joint swelling.   Neurological: Negative for tremors, seizures and syncope.   Hematological: Negative for adenopathy.   Psychiatric/Behavioral: Negative.        Objective   /84   Pulse 96   Ht 184.2 cm (72.5\")   Wt 93 kg (205 lb)   SpO2 98%   BMI 27.42 kg/m²     Physical Exam   Constitutional: He is oriented to person, place, and time. He appears well-developed and well-nourished. No distress.   HENT:   Head: Normocephalic and atraumatic. Hair is normal.   Right Ear: Hearing, tympanic membrane, external ear and ear canal normal.   Left Ear: Hearing, tympanic membrane, external ear and ear canal normal.   Nose: No sinus tenderness or nasal deformity.   Mouth/Throat: Uvula is midline, oropharynx is clear and moist and mucous membranes are normal. No oral lesions. No uvula swelling.   Eyes: Pupils are equal, round, and reactive to light. Conjunctivae, EOM and lids are normal. Right eye exhibits no discharge. Left eye exhibits no discharge. No scleral icterus. Right eye exhibits normal extraocular motion and no nystagmus. Left eye " exhibits normal extraocular motion and no nystagmus.   Fundoscopic exam:       The right eye shows red reflex.        The left eye shows red reflex.   Neck: Normal range of motion. Neck supple. No JVD present. No tracheal deviation present. No thyromegaly present.   Cardiovascular: Normal rate, regular rhythm, normal heart sounds, intact distal pulses and normal pulses. Exam reveals no gallop.   No murmur heard.  Pulmonary/Chest: Effort normal and breath sounds normal. No respiratory distress. He has no wheezes. He has no rales. He exhibits no tenderness.   Abdominal: Soft. Bowel sounds are normal. He exhibits no distension and no mass. There is no tenderness. There is no guarding. No hernia.   Genitourinary: Rectum normal, prostate normal, testes normal and penis normal. Rectal exam shows guaiac negative stool.   Genitourinary Comments: Denied chaperone for  exam   Musculoskeletal: Normal range of motion. He exhibits no edema, tenderness or deformity.   Lymphadenopathy:     He has no cervical adenopathy.   Neurological: He is alert and oriented to person, place, and time. He has normal reflexes. He displays normal reflexes. No cranial nerve deficit. He exhibits normal muscle tone. Coordination normal.   Skin: Skin is warm and dry. No rash noted. He is not diaphoretic.   Psychiatric: He has a normal mood and affect. His behavior is normal. Judgment and thought content normal.   Nursing note and vitals reviewed.      Results for orders placed or performed in visit on 03/09/20   Comprehensive Metabolic Panel   Result Value Ref Range    Glucose 184 (H) 65 - 99 mg/dL    BUN 13 8 - 23 mg/dL    Creatinine 0.98 0.76 - 1.27 mg/dL    Sodium 140 136 - 145 mmol/L    Potassium 4.1 3.5 - 5.2 mmol/L    Chloride 102 98 - 107 mmol/L    CO2 25.4 22.0 - 29.0 mmol/L    Calcium 9.8 8.6 - 10.5 mg/dL    Total Protein 7.5 6.0 - 8.5 g/dL    Albumin 4.50 3.50 - 5.20 g/dL    ALT (SGPT) 26 1 - 41 U/L    AST (SGOT) 20 1 - 40 U/L    Alkaline  Phosphatase 119 (H) 39 - 117 U/L    Total Bilirubin 0.6 0.2 - 1.2 mg/dL    eGFR Non African Amer 78 >60 mL/min/1.73    Globulin 3.0 gm/dL    A/G Ratio 1.5 g/dL    BUN/Creatinine Ratio 13.3 7.0 - 25.0    Anion Gap 12.6 5.0 - 15.0 mmol/L   Lipid Panel   Result Value Ref Range    Total Cholesterol 229 (H) 0 - 200 mg/dL    Triglycerides 225 (H) 0 - 150 mg/dL    HDL Cholesterol 42 40 - 60 mg/dL    LDL Cholesterol  142 (H) 0 - 100 mg/dL    VLDL Cholesterol 45 (H) 5 - 40 mg/dL    LDL/HDL Ratio 3.38    TSH   Result Value Ref Range    TSH 0.977 0.270 - 4.200 uIU/mL   PSA Screen   Result Value Ref Range    PSA 1.570 0.000 - 4.000 ng/mL   CBC Auto Differential   Result Value Ref Range    WBC 9.08 3.40 - 10.80 10*3/mm3    RBC 4.98 4.14 - 5.80 10*6/mm3    Hemoglobin 15.3 13.0 - 17.7 g/dL    Hematocrit 44.4 37.5 - 51.0 %    MCV 89.2 79.0 - 97.0 fL    MCH 30.7 26.6 - 33.0 pg    MCHC 34.5 31.5 - 35.7 g/dL    RDW 12.1 (L) 12.3 - 15.4 %    RDW-SD 38.9 37.0 - 54.0 fl    MPV 10.3 6.0 - 12.0 fL    Platelets 299 140 - 450 10*3/mm3    Neutrophil % 67.2 42.7 - 76.0 %    Lymphocyte % 22.8 19.6 - 45.3 %    Monocyte % 6.2 5.0 - 12.0 %    Eosinophil % 2.4 0.3 - 6.2 %    Basophil % 0.7 0.0 - 1.5 %    Immature Grans % 0.7 (H) 0.0 - 0.5 %    Neutrophils, Absolute 6.11 1.70 - 7.00 10*3/mm3    Lymphocytes, Absolute 2.07 0.70 - 3.10 10*3/mm3    Monocytes, Absolute 0.56 0.10 - 0.90 10*3/mm3    Eosinophils, Absolute 0.22 0.00 - 0.40 10*3/mm3    Basophils, Absolute 0.06 0.00 - 0.20 10*3/mm3    Immature Grans, Absolute 0.06 (H) 0.00 - 0.05 10*3/mm3    nRBC 0.0 0.0 - 0.2 /100 WBC   POC Glycosylated Hemoglobin (Hb A1C)   Result Value Ref Range    Hemoglobin A1C 10.3 %   POC Glucose   Result Value Ref Range    Glucose 165 (A) 70 - 130 mg/dL        ASSESSMENT/PLAN    Problem List Items Addressed This Visit        Cardiovascular and Mediastinum    Mixed hyperlipidemia    Relevant Medications    rosuvastatin (CRESTOR) 10 MG tablet       Endocrine    Diabetes  mellitus without complication (CMS/[facility])     Diabetes is worsening.   Continue current treatment regimen.  Reminded to bring in blood sugar diary at next visit.  Dietary recommendations for ADA diet.  Regular aerobic exercise.  Endocrinology clinic referral. Restart regular medications since he has not been taking them for a few weeks.  Diabetes will be reassessed in 3 months.         Relevant Medications    Empagliflozin 10 MG tablet    pioglitazone (ACTOS) 30 MG tablet       Other    Health care maintenance - Primary     Immunizations: shingrix recommended  Eye exam: done 2018, recommended  Prostate exam: done today  PSA: ordered  Colonoscopy: cologuard due in 2021  Labs: fasting labs ordered    Counseled patient regarding multimodal approach with healthy nutrition, healthy sleep, regular physical activity, social activities, counseling, safety measures and medications.            Relevant Orders    CBC & Differential (Completed)    Comprehensive Metabolic Panel (Completed)    Lipid Panel (Completed)    TSH (Completed)    PSA Screen (Completed)    CBC Auto Differential (Completed)      Other Visit Diagnoses     Erectile dysfunction, unspecified erectile dysfunction type        Relevant Medications    tadalafil (CIALIS) 10 MG tablet               Return in about 1 year (around 3/9/2021) for Annual with fasting labs.

## 2020-03-11 PROBLEM — Z00.00 HEALTH CARE MAINTENANCE: Status: ACTIVE | Noted: 2020-03-11

## 2020-03-12 NOTE — ASSESSMENT & PLAN NOTE
Immunizations: shingrix recommended  Eye exam: done 2018, recommended  Prostate exam: done today  PSA: ordered  Colonoscopy: cologuard due in 2021  Labs: fasting labs ordered    Counseled patient regarding multimodal approach with healthy nutrition, healthy sleep, regular physical activity, social activities, counseling, safety measures and medications.

## 2020-03-12 NOTE — ASSESSMENT & PLAN NOTE
Diabetes is worsening.   Continue current treatment regimen.  Reminded to bring in blood sugar diary at next visit.  Dietary recommendations for ADA diet.  Regular aerobic exercise.  Endocrinology clinic referral. Restart regular medications since he has not been taking them for a few weeks.  Diabetes will be reassessed in 3 months.

## 2020-03-17 DIAGNOSIS — E78.2 MIXED HYPERLIPIDEMIA: ICD-10-CM

## 2020-03-17 RX ORDER — ROSUVASTATIN CALCIUM 20 MG/1
20 TABLET, COATED ORAL DAILY
Qty: 90 TABLET | Refills: 3 | Status: SHIPPED | OUTPATIENT
Start: 2020-03-17 | End: 2020-06-22 | Stop reason: SDUPTHER

## 2020-03-19 RX ORDER — SILDENAFIL 50 MG/1
TABLET, FILM COATED ORAL
Qty: 10 TABLET | Refills: 2 | Status: SHIPPED | OUTPATIENT
Start: 2020-03-19 | End: 2020-06-05

## 2020-05-04 ENCOUNTER — TELEPHONE (OUTPATIENT)
Dept: INTERNAL MEDICINE | Facility: CLINIC | Age: 60
End: 2020-05-04

## 2020-05-04 NOTE — TELEPHONE ENCOUNTER
PT CALLED STATING THAT HE HAS SOME PAIN AND NUMBNESS IN HIS RIGHT ARM AND HAND. IT'S BEEN LIKE THIS FOR ABOUT A WEEK. PT ASKED IF SOMETHING COULD BE CALLED IN.    PT CONTACT [phone]     PHARMACY VERIFIED ZIGGY

## 2020-05-06 NOTE — TELEPHONE ENCOUNTER
PT CALLED STATING THAT HE HASN'T GOTTEN A RESPONSE TO HIS MESSAGE.  PT WOULD LIKE TO BE ADVISED ASAP      PLEASE ADVISE [phone]

## 2020-05-07 ENCOUNTER — HOSPITAL ENCOUNTER (OUTPATIENT)
Dept: GENERAL RADIOLOGY | Facility: HOSPITAL | Age: 60
Discharge: HOME | End: 2020-05-07
Admitting: PHYSICIAN ASSISTANT

## 2020-05-07 ENCOUNTER — OFFICE VISIT (OUTPATIENT)
Dept: INTERNAL MEDICINE | Facility: CLINIC | Age: 60
End: 2020-05-07

## 2020-05-07 VITALS
BODY MASS INDEX: 26.3 KG/M2 | SYSTOLIC BLOOD PRESSURE: 100 MMHG | HEART RATE: 82 BPM | WEIGHT: 196.6 LBS | DIASTOLIC BLOOD PRESSURE: 60 MMHG | OXYGEN SATURATION: 99 %

## 2020-05-07 DIAGNOSIS — S49.91XA INJURY OF RIGHT SHOULDER, INITIAL ENCOUNTER: Primary | ICD-10-CM

## 2020-05-07 DIAGNOSIS — S49.91XA INJURY OF RIGHT SHOULDER, INITIAL ENCOUNTER: ICD-10-CM

## 2020-05-07 PROCEDURE — 73030 X-RAY EXAM OF SHOULDER: CPT

## 2020-05-07 PROCEDURE — 99213 OFFICE O/P EST LOW 20 MIN: CPT | Performed by: PHYSICIAN ASSISTANT

## 2020-05-07 RX ORDER — DICLOFENAC SODIUM 75 MG/1
75 TABLET, DELAYED RELEASE ORAL 2 TIMES DAILY
Qty: 30 TABLET | Refills: 1 | Status: SHIPPED | OUTPATIENT
Start: 2020-05-07 | End: 2021-03-11

## 2020-05-07 RX ORDER — CYCLOBENZAPRINE HCL 5 MG
5 TABLET ORAL 3 TIMES DAILY PRN
Qty: 30 TABLET | Refills: 0 | Status: SHIPPED | OUTPATIENT
Start: 2020-05-07 | End: 2021-03-11 | Stop reason: SDUPTHER

## 2020-05-07 NOTE — PROGRESS NOTES
Chief Complaint   Patient presents with   • Arm Pain       Subjective   Ceasar Hawk is a 59 y.o. male.       History of Present Illness     About a month ago pt did some work cutting some trees and developed some pain in his right shoulder. It seemed to get better but then worked to put some shelves in and the pain returned. He is having pain in his posterior shoulder that radiates down his right arm with tingling into his fingertips. Worse when he sleeps on his right arm, with driving or using his right arm. Can sometimes find a comfortable angle. Taking ibuprofen 400 mg twice a day. Could take it more often but he does not.     Not checking his blood sugars regularly.        Current Outpatient Medications:   •  Empagliflozin 10 MG tablet, Take 10 mg by mouth Daily., Disp: 90 tablet, Rfl: 1  •  glipiZIDE (GLUCOTROL XL) 10 MG 24 hr tablet, take 1 tablet by mouth once daily, Disp: 30 tablet, Rfl: 2  •  metFORMIN (FORTAMET) 1000 MG (OSM) 24 hr tablet, Take 1 tablet by mouth Daily With Breakfast., Disp: 30 tablet, Rfl: 5  •  pioglitazone (ACTOS) 30 MG tablet, Take 1 tablet by mouth Daily., Disp: 90 tablet, Rfl: 1  •  rosuvastatin (CRESTOR) 20 MG tablet, Take 1 tablet by mouth Daily., Disp: 90 tablet, Rfl: 3  •  sildenafil (VIAGRA) 50 MG tablet, Take 1 tablet by mouth 1 hr before activity as needed for erectile dysfunction, Disp: 10 tablet, Rfl: 2  •  tadalafil (CIALIS) 10 MG tablet, Take 1 tablet by mouth Daily As Needed for Erectile Dysfunction. Take 1 hour prior to activity., Disp: 10 tablet, Rfl: 1  •  cyclobenzaprine (FLEXERIL) 5 MG tablet, Take 1 tablet by mouth 3 (Three) Times a Day As Needed for Muscle Spasms., Disp: 30 tablet, Rfl: 0  •  diclofenac (VOLTAREN) 75 MG EC tablet, Take 1 tablet by mouth 2 (Two) Times a Day., Disp: 30 tablet, Rfl: 1     PMFSH  The following portions of the patient's history were reviewed and updated as appropriate: allergies, current medications, past family history, past medical  history, past social history, past surgical history and problem list.    Review of Systems   Constitutional: Negative for fever and unexpected weight change.   HENT: Negative.    Eyes: Negative.    Respiratory: Negative for shortness of breath and wheezing.    Cardiovascular: Negative for chest pain and palpitations.   Gastrointestinal: Negative for abdominal pain and blood in stool.   Endocrine: Negative.    Genitourinary: Negative.    Musculoskeletal: Positive for myalgias. Negative for joint swelling.   Skin: Negative.    Allergic/Immunologic: Negative.    Neurological: Negative for seizures and syncope.   Hematological: Negative for adenopathy.   Psychiatric/Behavioral: Negative for agitation and confusion.       Objective   /60   Pulse 82   Wt 89.2 kg (196 lb 9.6 oz)   SpO2 99%   BMI 26.30 kg/m²     Physical Exam   Constitutional: He appears well-developed and well-nourished.   HENT:   Head: Normocephalic.   Right Ear: Hearing, tympanic membrane, external ear and ear canal normal.   Left Ear: Hearing, tympanic membrane, external ear and ear canal normal.   Nose: Nose normal.   Mouth/Throat: Oropharynx is clear and moist.   Eyes: Pupils are equal, round, and reactive to light. Conjunctivae are normal.   Neck: Normal range of motion.   Cardiovascular: Normal rate, regular rhythm and normal heart sounds.   Pulmonary/Chest: Effort normal and breath sounds normal. He has no decreased breath sounds. He has no wheezes. He has no rhonchi. He has no rales.   Musculoskeletal:        Right shoulder: He exhibits decreased range of motion, tenderness and pain. He exhibits no swelling and normal strength.   Neurological: He is alert.   Skin: Skin is warm and dry.   Psychiatric: He has a normal mood and affect. His behavior is normal.   Nursing note and vitals reviewed.           ASSESSMENT/PLAN    Problem List Items Addressed This Visit     None      Visit Diagnoses     Injury of right shoulder, initial encounter     -  Primary    Check xray of shoulder, refer to PT. Start diclofenac and flexeril as directed.    Relevant Medications    diclofenac (VOLTAREN) 75 MG EC tablet    cyclobenzaprine (FLEXERIL) 5 MG tablet    Other Relevant Orders    XR Shoulder 2+ View Right (Completed)    Ambulatory Referral to Physical Therapy Evaluate and treat (Completed)               Return in about 4 weeks (around 6/4/2020) for Follow up.

## 2020-06-05 ENCOUNTER — OFFICE VISIT (OUTPATIENT)
Dept: INTERNAL MEDICINE | Facility: CLINIC | Age: 60
End: 2020-06-05

## 2020-06-05 VITALS
HEIGHT: 73 IN | WEIGHT: 186 LBS | RESPIRATION RATE: 16 BRPM | TEMPERATURE: 97.3 F | DIASTOLIC BLOOD PRESSURE: 66 MMHG | OXYGEN SATURATION: 98 % | SYSTOLIC BLOOD PRESSURE: 104 MMHG | BODY MASS INDEX: 24.65 KG/M2 | HEART RATE: 84 BPM

## 2020-06-05 DIAGNOSIS — M25.511 ACUTE PAIN OF RIGHT SHOULDER: Primary | ICD-10-CM

## 2020-06-05 PROCEDURE — 99212 OFFICE O/P EST SF 10 MIN: CPT | Performed by: PHYSICIAN ASSISTANT

## 2020-06-05 NOTE — PROGRESS NOTES
Chief Complaint   Patient presents with   • Shoulder Injury     Follow up       Subjective   Ceasar Hawk is a 59 y.o. male.       History of Present Illness     Pt's shoulder is much better, he has been doing stretches and taking prn diclofenac. Did not need PT.     Not checking blood sugar, has appt with Endo scheduled with 6/22. He just refilled his jardiance, was questioning whether he was still on it.        Current Outpatient Medications:   •  cyclobenzaprine (FLEXERIL) 5 MG tablet, Take 1 tablet by mouth 3 (Three) Times a Day As Needed for Muscle Spasms., Disp: 30 tablet, Rfl: 0  •  diclofenac (VOLTAREN) 75 MG EC tablet, Take 1 tablet by mouth 2 (Two) Times a Day., Disp: 30 tablet, Rfl: 1  •  Empagliflozin 10 MG tablet, Take 10 mg by mouth Daily., Disp: 90 tablet, Rfl: 1  •  glipiZIDE (GLUCOTROL XL) 10 MG 24 hr tablet, take 1 tablet by mouth once daily, Disp: 30 tablet, Rfl: 2  •  metFORMIN (FORTAMET) 1000 MG (OSM) 24 hr tablet, Take 1 tablet by mouth Daily With Breakfast., Disp: 30 tablet, Rfl: 5  •  pioglitazone (ACTOS) 30 MG tablet, Take 1 tablet by mouth Daily., Disp: 90 tablet, Rfl: 1  •  rosuvastatin (CRESTOR) 20 MG tablet, Take 1 tablet by mouth Daily., Disp: 90 tablet, Rfl: 3     PMFSH  The following portions of the patient's history were reviewed and updated as appropriate: allergies, current medications, past family history, past medical history, past social history, past surgical history and problem list.    Review of Systems   Constitutional: Negative for activity change, appetite change and fatigue.   HENT: Negative for congestion and rhinorrhea.    Respiratory: Negative for chest tightness and shortness of breath.    Cardiovascular: Negative for chest pain and palpitations.   Gastrointestinal: Negative for abdominal pain.   Genitourinary: Negative for dysuria.   Musculoskeletal: Negative for arthralgias and myalgias.   Neurological: Negative for dizziness, weakness, light-headedness and  "headaches.   Psychiatric/Behavioral: Negative for dysphoric mood. The patient is not nervous/anxious.        Objective   /66   Pulse 84   Temp 97.3 °F (36.3 °C)   Resp 16   Ht 184.2 cm (72.5\")   Wt 84.4 kg (186 lb)   SpO2 98%   BMI 24.88 kg/m²     Physical Exam   Constitutional: He appears well-developed and well-nourished.   HENT:   Head: Normocephalic.   Right Ear: Hearing, tympanic membrane, external ear and ear canal normal.   Left Ear: Hearing, tympanic membrane, external ear and ear canal normal.   Nose: Nose normal.   Mouth/Throat: Oropharynx is clear and moist.   Eyes: Pupils are equal, round, and reactive to light. Conjunctivae are normal.   Neck: Normal range of motion.   Cardiovascular: Normal rate, regular rhythm and normal heart sounds.   Pulmonary/Chest: Effort normal and breath sounds normal. He has no decreased breath sounds. He has no wheezes. He has no rhonchi. He has no rales.   Musculoskeletal: Normal range of motion.        Right shoulder: He exhibits normal range of motion, no tenderness, no bony tenderness, no swelling, no effusion, no pain, no spasm and normal strength.   Neurological: He is alert.   Skin: Skin is warm and dry.   Psychiatric: He has a normal mood and affect. His behavior is normal.   Nursing note and vitals reviewed.      ASSESSMENT/PLAN    Problem List Items Addressed This Visit     None      Visit Diagnoses     Acute pain of right shoulder    -  Primary    Improving with home exercises and prn use of diclofenac and flexeril. Continue as directed, call if not resolving completely- will refer to PT.               Return for Next scheduled follow up.  "

## 2020-06-22 ENCOUNTER — OFFICE VISIT (OUTPATIENT)
Dept: ENDOCRINOLOGY | Facility: CLINIC | Age: 60
End: 2020-06-22

## 2020-06-22 VITALS
SYSTOLIC BLOOD PRESSURE: 122 MMHG | WEIGHT: 185.8 LBS | DIASTOLIC BLOOD PRESSURE: 82 MMHG | HEART RATE: 81 BPM | HEIGHT: 73 IN | OXYGEN SATURATION: 99 % | BODY MASS INDEX: 24.63 KG/M2

## 2020-06-22 DIAGNOSIS — E11.65 TYPE 2 DIABETES MELLITUS WITH HYPERGLYCEMIA, WITHOUT LONG-TERM CURRENT USE OF INSULIN (HCC): Primary | ICD-10-CM

## 2020-06-22 DIAGNOSIS — E78.2 MIXED HYPERLIPIDEMIA: ICD-10-CM

## 2020-06-22 LAB
GLUCOSE BLDC GLUCOMTR-MCNC: 257 MG/DL (ref 70–130)
HBA1C MFR BLD: 12.6 %

## 2020-06-22 PROCEDURE — 99244 OFF/OP CNSLTJ NEW/EST MOD 40: CPT | Performed by: INTERNAL MEDICINE

## 2020-06-22 PROCEDURE — 83036 HEMOGLOBIN GLYCOSYLATED A1C: CPT | Performed by: INTERNAL MEDICINE

## 2020-06-22 PROCEDURE — 82947 ASSAY GLUCOSE BLOOD QUANT: CPT | Performed by: INTERNAL MEDICINE

## 2020-06-22 RX ORDER — PIOGLITAZONEHYDROCHLORIDE 30 MG/1
30 TABLET ORAL DAILY
Qty: 90 TABLET | Refills: 1 | Status: SHIPPED | OUTPATIENT
Start: 2020-06-22 | End: 2021-01-28 | Stop reason: SDUPTHER

## 2020-06-22 RX ORDER — GLIPIZIDE 10 MG/1
10 TABLET, FILM COATED, EXTENDED RELEASE ORAL DAILY
Qty: 90 TABLET | Refills: 1 | Status: SHIPPED | OUTPATIENT
Start: 2020-06-22 | End: 2020-12-15

## 2020-06-22 RX ORDER — ROSUVASTATIN CALCIUM 10 MG/1
10 TABLET, COATED ORAL DAILY
Qty: 90 TABLET | Refills: 1 | Status: SHIPPED | OUTPATIENT
Start: 2020-06-22 | End: 2020-09-28 | Stop reason: DRUGHIGH

## 2020-06-22 NOTE — PROGRESS NOTES
Chief Complaint   Patient presents with   • Diabetes     New Patient - Type 2 Diabetes        Referring Provider  Teresa Marcus, AMBAR COHEN   Ceasar Hawk is a 59 y.o. male had concerns including Diabetes (New Patient - Type 2 Diabetes).    Diabetes was diagnosed 10 years ago.  Complications include: none known though complains of mild symptomatic neuropathy.  Last ophtho exam was years ago.  Current medications for diabetes include jentadueto, glipizide 10 mg, actos. He takes his medication maybe only about 40% of the time.   He checks his blood sugar 2-3 times per week. BGs running around 200-250 at home.     A1c has trended up to 12.6 from 10.3 since March.    Diet: has recently cut back to 2 sodas and one candy bar per day. Was drinking more soda through the day.  Breakfast: sometimes eats, sandwich from fast food (sausage mcmuffin or biscuit)  Lunch: skips often, sometimes snacks on candy bar  Dinner: meatloaf, steak, and sides (baked potato)  Otherwise drinks sugar free lemonade.    PCP increased his rosuvastatin to 20 mg daily in March.  Patient however almost never takes the rosuvastatin, even at the 10 mg dose he was not taking.    Past Medical History:   Diagnosis Date   • Hyperlipidemia    • Type 2 diabetes mellitus (CMS/[facility])      History reviewed. No pertinent surgical history.   Family History   Problem Relation Age of Onset   • No Known Problems Mother    • Heart attack Father       Social History     Socioeconomic History   • Marital status:      Spouse name: Not on file   • Number of children: Not on file   • Years of education: Not on file   • Highest education level: Not on file   Tobacco Use   • Smoking status: Never Smoker   • Smokeless tobacco: Never Used   Substance and Sexual Activity   • Alcohol use: No   • Drug use: No   • Sexual activity: Defer      No Known Allergies   Current Outpatient Medications on File Prior to Visit   Medication Sig Dispense Refill   • cyclobenzaprine  "(FLEXERIL) 5 MG tablet Take 1 tablet by mouth 3 (Three) Times a Day As Needed for Muscle Spasms. 30 tablet 0   • diclofenac (VOLTAREN) 75 MG EC tablet Take 1 tablet by mouth 2 (Two) Times a Day. 30 tablet 1   • [DISCONTINUED] Empagliflozin 10 MG tablet Take 10 mg by mouth Daily. 90 tablet 1   • [DISCONTINUED] glipiZIDE (GLUCOTROL XL) 10 MG 24 hr tablet take 1 tablet by mouth once daily 30 tablet 2   • [DISCONTINUED] linaGLIPtin-metFORMIN HCl (Jentadueto) 2.5-500 MG tablet Take  by mouth.     • [DISCONTINUED] pioglitazone (ACTOS) 30 MG tablet Take 1 tablet by mouth Daily. 90 tablet 1   • [DISCONTINUED] rosuvastatin (CRESTOR) 20 MG tablet Take 1 tablet by mouth Daily. (Patient taking differently: Take 10 mg by mouth Daily.) 90 tablet 3   • [DISCONTINUED] metFORMIN (FORTAMET) 1000 MG (OSM) 24 hr tablet Take 1 tablet by mouth Daily With Breakfast. 30 tablet 5     No current facility-administered medications on file prior to visit.         Review of Systems   Constitutional: Positive for fatigue.   HENT: Negative.    Eyes: Negative.    Respiratory: Negative.    Cardiovascular: Negative.    Gastrointestinal: Negative.    Endocrine: Negative.    Genitourinary: Negative.    Musculoskeletal: Negative.    Skin: Negative.    Allergic/Immunologic: Negative.    Neurological: Negative.    Hematological: Negative.    Psychiatric/Behavioral: Negative.       ROS was reviewed and verified. All other ROS negative.    /82 (BP Location: Left arm, Patient Position: Sitting, Cuff Size: Adult)   Pulse 81   Ht 184.2 cm (72.5\")   Wt 84.3 kg (185 lb 12.8 oz)   SpO2 99%   BMI 24.85 kg/m²      Physical Exam    Ceasar had a diabetic foot exam performed today.   During the foot exam he had a monofilament test performed (5/5 bilaterally).  Vascular Status -  His right foot exhibits normal foot vasculature  and no edema. His left foot exhibits normal foot vasculature  and no edema.  Skin Integrity  -  His right foot skin is intact.His " left foot skin is intact..    Constitutional:  well developed; well nourished  no acute distress   ENT/Thyroid: no thyromegaly  no palpable nodules   Eyes: EOM intact  Conjunctiva: clear   Respiratory:  breathing is unlabored  clear to auscultation bilaterally   Cardiovascular:  regular rate and rhythm, S1, S2 normal, no murmur, click, rub or gallop   Chest:  Not performed.   Abdomen: Not performed.   : Not performed.   Musculoskeletal: negative findings:  ROM of all joints is normal, no deformities present   Skin: dry and warm   Neuro: normal without focal findings, mental status, speech normal, alert and oriented x3 and GILADRDO   Psych: oriented to time, place and person, mood and affect are within normal limits     CMP:  Lab Results   Component Value Date    BUN 13 03/09/2020    CREATININE 0.98 03/09/2020    EGFRIFNONA 78 03/09/2020    BCR 13.3 03/09/2020     03/09/2020    K 4.1 03/09/2020    CO2 25.4 03/09/2020    CALCIUM 9.8 03/09/2020    ALBUMIN 4.50 03/09/2020    BILITOT 0.6 03/09/2020    ALKPHOS 119 (H) 03/09/2020    AST 20 03/09/2020    ALT 26 03/09/2020     Lipid Panel:  Lab Results   Component Value Date    CHOL 229 (H) 03/09/2020    TRIG 225 (H) 03/09/2020    HDL 42 03/09/2020    VLDL 45 (H) 03/09/2020     (H) 03/09/2020     HbA1c:  Lab Results   Component Value Date    HGBA1C 12.6 06/22/2020    HGBA1C 10.3 03/09/2020     Glucose:  Lab Results   Component Value Date    POCGLU 257 (A) 06/22/2020     Microalbumin:  Lab Results   Component Value Date    MALBCRERATIO 2.4 03/21/2018     TSH:  Lab Results   Component Value Date    TSH 0.977 03/09/2020       Assessment and Plan    Ceasar was seen today for diabetes.    Diagnoses and all orders for this visit:    Type 2 diabetes mellitus with hyperglycemia, without long-term current use of insulin (CMS/[facility])  Uncontrolled and essentially untreated as the patient is not taking his medications.  A1c up to 12.6 today from 10.3 when last checked in  March.  Resume taking combination DPP 4 and metformin (Janumet) 1 tab twice daily, glipizide 10 mg daily, Actos 30 mg daily, Jardiance 10 mg daily.  He must check his blood sugars twice daily.  Call the office if BG is trending above 200 much of the time.  Discontinue regular soda and concentrated sweets.  Discussed alternate breakfast options.  As his A1c improves with compliance with medication, consider increase in Jardiance to 25 mg and transition off of combination metformin and DPP 4 to metformin only plus a GLP-1 agonist. Increased SGLT-2 not recommended now due to risk of UTI/yeast infection.  If adequate glucose control is not achieved with oral regimen, insulin may be necessary as his A1c is quite elevated.  Labs are up-to-date from March.  Urine microalbumin is needed and will be checked at follow-up once blood sugars are improved.  Ophtho-exam is overdue and patient was encouraged to schedule.  Monofilament was updated in the office today.  -     POC Glucose  -     POC Glycosylated Hemoglobin (Hb A1C)  -     SITagliptin-metFORMIN HCl ER (Janumet XR)  MG tablet; Take 1 tablet by mouth 2 (two) times a day.  -     Empagliflozin 10 MG tablet; Take 10 mg by mouth Daily.  -     glipizide (GLUCOTROL XL) 10 MG 24 hr tablet; Take 1 tablet by mouth Daily.  -     pioglitazone (ACTOS) 30 MG tablet; Take 1 tablet by mouth Daily.    Mixed hyperlipidemia  PCP recommended increasing dose of rosuvastatin from March as LDL was elevated at 142.  Patient was not in fact taking the rosuvastatin and, therefore, I recommended that he resume taking rosuvastatin 10 mg nightly.  Dose increases may still be necessary. Will recheck CMP and lipid in 3 monthes  -     rosuvastatin (CRESTOR) 10 MG tablet; Take 1 tablet by mouth Daily.      Return in about 3 months (around 9/22/2020) for next scheduled follow up. The patient was instructed to contact the clinic with any interval questions or concerns.    Annette Lau, DO    Endocrinologist    Please note that portions of this document were completed with a voice recognition program. Efforts were made to edit the dictations, but occasionally words are mis-transcribed.

## 2020-08-04 RX ORDER — METFORMIN HYDROCHLORIDE 500 MG/1
1000 TABLET, EXTENDED RELEASE ORAL 2 TIMES DAILY
Qty: 360 TABLET | Refills: 1 | Status: SHIPPED | OUTPATIENT
Start: 2020-08-04 | End: 2021-04-14 | Stop reason: SDUPTHER

## 2020-08-04 NOTE — TELEPHONE ENCOUNTER
Patient returned call. Please call back. He states he does not take Janumet - he takes Metformin.

## 2020-08-04 NOTE — TELEPHONE ENCOUNTER
Please clarify with pt/pharmacy. I have that he is taking janumet twice daily? If so, he should not also be on metformin in addition to the janumet - which contains metformin.

## 2020-08-04 NOTE — TELEPHONE ENCOUNTER
LVM with pt to have him call back regarding this.     Spoke with pharmacist and she states the Janumet was never picked up due to copay cost so pt has not been taking that. If you want pt to continue the Metformin he will need refills. Please advise. Thanks!

## 2020-09-28 ENCOUNTER — OFFICE VISIT (OUTPATIENT)
Dept: ENDOCRINOLOGY | Facility: CLINIC | Age: 60
End: 2020-09-28

## 2020-09-28 ENCOUNTER — LAB (OUTPATIENT)
Dept: LAB | Facility: HOSPITAL | Age: 60
End: 2020-09-28

## 2020-09-28 VITALS
DIASTOLIC BLOOD PRESSURE: 64 MMHG | RESPIRATION RATE: 16 BRPM | TEMPERATURE: 96.3 F | HEART RATE: 77 BPM | SYSTOLIC BLOOD PRESSURE: 110 MMHG | OXYGEN SATURATION: 99 % | WEIGHT: 192.8 LBS | BODY MASS INDEX: 25.55 KG/M2 | HEIGHT: 73 IN

## 2020-09-28 DIAGNOSIS — E11.65 TYPE 2 DIABETES MELLITUS WITH HYPERGLYCEMIA, WITHOUT LONG-TERM CURRENT USE OF INSULIN (HCC): Primary | ICD-10-CM

## 2020-09-28 DIAGNOSIS — E78.2 MIXED HYPERLIPIDEMIA: ICD-10-CM

## 2020-09-28 LAB
ALBUMIN UR-MCNC: 7.5 MG/DL
CREAT UR-MCNC: 115.6 MG/DL
GLUCOSE BLDC GLUCOMTR-MCNC: 281 MG/DL (ref 70–130)
HBA1C MFR BLD: 7.9 %
MICROALBUMIN/CREAT UR: 64.9 MG/G

## 2020-09-28 PROCEDURE — 83036 HEMOGLOBIN GLYCOSYLATED A1C: CPT | Performed by: INTERNAL MEDICINE

## 2020-09-28 PROCEDURE — 82043 UR ALBUMIN QUANTITATIVE: CPT | Performed by: INTERNAL MEDICINE

## 2020-09-28 PROCEDURE — 82947 ASSAY GLUCOSE BLOOD QUANT: CPT | Performed by: INTERNAL MEDICINE

## 2020-09-28 PROCEDURE — 99214 OFFICE O/P EST MOD 30 MIN: CPT | Performed by: INTERNAL MEDICINE

## 2020-09-28 PROCEDURE — 82570 ASSAY OF URINE CREATININE: CPT | Performed by: INTERNAL MEDICINE

## 2020-09-28 RX ORDER — ROSUVASTATIN CALCIUM 20 MG/1
20 TABLET, COATED ORAL DAILY
COMMUNITY
Start: 2020-09-18 | End: 2021-03-11 | Stop reason: SDUPTHER

## 2020-09-28 RX ORDER — EMPAGLIFLOZIN 25 MG/1
1 TABLET, FILM COATED ORAL DAILY
Qty: 90 TABLET | Refills: 1 | Status: SHIPPED | OUTPATIENT
Start: 2020-09-28 | End: 2021-01-28

## 2020-09-28 NOTE — PROGRESS NOTES
"Chief Complaint   Patient presents with   • Diabetes     Type 2 DM         HPI   Ceasar Hawk is a 59 y.o. male had concerns including Diabetes (Type 2 DM ).    He is checking blood sugars 1-2 times per day.  BG is running in the 100s most of the time.  BG in the office is 281 this morning.  Reports that he had a big breakfast, sausage, biscuits, gravy, eggs with a Dr. Pepper.  Still having regular soda on occasion.  At his last visit patient was taking his medications consistently but has since improved compliance with medications.  Current medications for diabetes include metformin 1000 mg twice daily, glipizide 10 mg daily, Jardiance 10 mg daily, Actos 30 mg daily.    PCP increased his Crestor from 10 mg to 20 mg at his last visit.  He is tolerating without difficulty.  Is due for repeat labs in January at his regular physical.    Ophtho exam is up-to-date within the last 3 months at an optometry office in [place].    The following portions of the patient's history were reviewed and updated as appropriate: allergies, current medications, past family history, past medical history, past social history, past surgical history and problem list.    Review of Systems   Constitutional: Negative.    HENT: Negative.    Eyes: Negative.    Respiratory: Negative.    Cardiovascular: Negative.    Gastrointestinal: Negative.    Endocrine: Negative.    Genitourinary: Negative.    Musculoskeletal: Negative.    Skin: Negative.    Allergic/Immunologic: Negative.    Neurological: Negative.    Hematological: Negative.    Psychiatric/Behavioral: Negative.       ROS was reviewed and verified. All other ROS negative.    /64   Pulse 77   Temp 96.3 °F (35.7 °C) (Infrared)   Resp 16   Ht 184.2 cm (72.52\")   Wt 87.5 kg (192 lb 12.8 oz)   SpO2 99%   BMI 25.77 kg/m²      Physical Exam     Constitutional:  well developed; well nourished  no acute distress   ENT/Thyroid: no thyromegaly  no palpable nodules   Eyes: EOM " intact  Conjunctiva: clear   Respiratory:  breathing is unlabored  clear to auscultation bilaterally   Cardiovascular:  regular rate and rhythm, S1, S2 normal, no murmur, click, rub or gallop   Chest:  Not performed.   Abdomen: Not performed.   : Not performed.   Musculoskeletal: negative findings:  ROM of all joints is normal, no deformities present   Skin: dry and warm   Neuro: normal without focal findings and mental status, speech normal, alert and oriented x3   Psych: oriented to time, place and person, mood and affect are within normal limits     CMP:  Lab Results   Component Value Date    BUN 13 03/09/2020    CREATININE 0.98 03/09/2020    EGFRIFNONA 78 03/09/2020    BCR 13.3 03/09/2020     03/09/2020    K 4.1 03/09/2020    CO2 25.4 03/09/2020    CALCIUM 9.8 03/09/2020    ALBUMIN 4.50 03/09/2020    BILITOT 0.6 03/09/2020    ALKPHOS 119 (H) 03/09/2020    AST 20 03/09/2020    ALT 26 03/09/2020     Lipid Panel:  Lab Results   Component Value Date    CHOL 229 (H) 03/09/2020    TRIG 225 (H) 03/09/2020    HDL 42 03/09/2020    VLDL 45 (H) 03/09/2020     (H) 03/09/2020     HbA1c:  Lab Results   Component Value Date    HGBA1C 7.9 09/28/2020    HGBA1C 12.6 06/22/2020     Glucose:    Lab Results   Component Value Date    POCGLU 281 (A) 09/28/2020     Microalbumin:  Lab Results   Component Value Date    MALBCRERATIO 2.4 03/21/2018     TSH:  Lab Results   Component Value Date    TSH 0.977 03/09/2020       Assessment and Plan    Ceasar was seen today for diabetes.    Diagnoses and all orders for this visit:    Type 2 diabetes mellitus with hyperglycemia, without long-term current use of insulin (CMS/[facility])  Uncontrolled with A1c 7.9 and glucose in the office today 281 though much improved from his last visit with an A1c of 12.6.  Mild neuropathy related to diabetes.  Continue metformin, glipizide, Actos.  Increase Jardiance to 25 mg daily.  Cautioned regarding possible UTI or yeast infection.  Continue dietary  modifications.  Discontinue regular soda.  Labs will be updated in January.  Urine microalbumin to creatinine ratio is due and will be checked today.  Ophtho-exam is up-to-date from about 3 months ago.  Monofilament updated from June 2020.  -     POC Glucose, Blood  -     POC Glycosylated Hemoglobin (Hb A1C)  -     Empagliflozin (Jardiance) 25 MG tablet; Take 25 mg by mouth Daily.  -     Cancel: Microalbumin / Creatinine Urine Ratio - Urine, Clean Catch; Future  -     Microalbumin / Creatinine Urine Ratio - Urine, Clean Catch    Mixed hyperlipidemia  PCP increased rosuvastatin to 20 mg daily.  Repeat CMP and lipid with next labs, scheduled for yearly physical in January.  Target LDL less than 70.       Return in about 4 months (around 1/28/2021) for next scheduled follow up. The patient was instructed to contact the clinic with any interval questions or concerns.    Annette Lau, DO   Endocrinologist    Please note that portions of this document were completed with a voice recognition program. Efforts were made to edit the dictations, but occasionally words are mis-transcribed.

## 2020-10-01 ENCOUNTER — EPISODE CHANGES (OUTPATIENT)
Dept: CASE MANAGEMENT | Facility: OTHER | Age: 60
End: 2020-10-01

## 2020-12-15 DIAGNOSIS — E11.65 TYPE 2 DIABETES MELLITUS WITH HYPERGLYCEMIA, WITHOUT LONG-TERM CURRENT USE OF INSULIN (HCC): ICD-10-CM

## 2020-12-15 RX ORDER — GLIPIZIDE 10 MG/1
10 TABLET, FILM COATED, EXTENDED RELEASE ORAL DAILY
Qty: 90 TABLET | Refills: 0 | Status: SHIPPED | OUTPATIENT
Start: 2020-12-15 | End: 2021-01-28 | Stop reason: SDUPTHER

## 2021-01-28 ENCOUNTER — OFFICE VISIT (OUTPATIENT)
Dept: ENDOCRINOLOGY | Facility: CLINIC | Age: 61
End: 2021-01-28

## 2021-01-28 VITALS
HEART RATE: 80 BPM | BODY MASS INDEX: 27.67 KG/M2 | SYSTOLIC BLOOD PRESSURE: 112 MMHG | HEIGHT: 73 IN | WEIGHT: 208.8 LBS | DIASTOLIC BLOOD PRESSURE: 70 MMHG

## 2021-01-28 DIAGNOSIS — E78.2 MIXED HYPERLIPIDEMIA: ICD-10-CM

## 2021-01-28 DIAGNOSIS — E11.65 TYPE 2 DIABETES MELLITUS WITH HYPERGLYCEMIA, WITHOUT LONG-TERM CURRENT USE OF INSULIN (HCC): Primary | ICD-10-CM

## 2021-01-28 DIAGNOSIS — E66.3 OVERWEIGHT (BMI 25.0-29.9): ICD-10-CM

## 2021-01-28 LAB
EXPIRATION DATE: NORMAL
HBA1C MFR BLD: 7.2 %
Lab: NORMAL

## 2021-01-28 PROCEDURE — 99214 OFFICE O/P EST MOD 30 MIN: CPT | Performed by: INTERNAL MEDICINE

## 2021-01-28 PROCEDURE — 83036 HEMOGLOBIN GLYCOSYLATED A1C: CPT | Performed by: INTERNAL MEDICINE

## 2021-01-28 RX ORDER — PIOGLITAZONEHYDROCHLORIDE 30 MG/1
30 TABLET ORAL DAILY
Qty: 90 TABLET | Refills: 1 | Status: SHIPPED | OUTPATIENT
Start: 2021-01-28 | End: 2021-07-29 | Stop reason: SDUPTHER

## 2021-01-28 RX ORDER — GLIPIZIDE 10 MG/1
10 TABLET, FILM COATED, EXTENDED RELEASE ORAL DAILY
Qty: 90 TABLET | Refills: 1 | Status: SHIPPED | OUTPATIENT
Start: 2021-01-28 | End: 2021-07-20

## 2021-01-28 NOTE — PROGRESS NOTES
"Chief Complaint   Patient presents with   • Diabetes          HPI   Ceasar Hawk is a 60 y.o. male had concerns including Diabetes.    He is checking blood sugars 2-3 times per day. BGs are mostly mid to low 100s. Has had a few post meal glucose levels up to 200s but has changed his diet since that time.  Current medications for diabetes include metformin, glipizide 10 mg daily, and actos 30 mg daily.  He was previously on Jardiance but discontinued due to genital yeast/UTI symptoms.  A1c is improved to 7.2 from 7.9 at his last visit.    Weight has increased about 20 lbs since his last visit.   Is now eating breakfast - sausage and egg breakfast at [employer]   Has decreased his intake of regular soda.   Thinks weight gain is due primarily to decreased activity in the winter months.   Last ophtho exam was last summer - [facility]    Is getting sinus headaches about once a week. Has used nasal spray in the past with relief of symptoms.     He is on Crestor 20 mg daily.  Dose was increased from 10 mg after his last labs from March.  He is due for routine physical with fasting labs in March.    The following portions of the patient's history were reviewed and updated as appropriate: allergies, current medications, past family history, past medical history, past social history, past surgical history and problem list.      Review of Systems   HENT: Positive for sinus pressure.    Neurological: Positive for headache.   All other systems reviewed and are negative.       Physical Exam  Vitals signs reviewed.   Constitutional:       Appearance: Normal appearance. He is normal weight.   Pulmonary:      Effort: Pulmonary effort is normal.   Neurological:      Mental Status: He is alert.   Psychiatric:         Mood and Affect: Mood normal.         Behavior: Behavior normal.        /70 (BP Location: Right arm, Patient Position: Sitting, Cuff Size: Adult)   Pulse 80   Ht 184.2 cm (72.5\")   Wt 94.7 kg (208 lb 12.8 oz)   BMI 27.93 " kg/m²      Labs and imaging    CMP:  Lab Results   Component Value Date    BUN 13 03/09/2020    CREATININE 0.98 03/09/2020    EGFRIFNONA 78 03/09/2020    BCR 13.3 03/09/2020     03/09/2020    K 4.1 03/09/2020    CO2 25.4 03/09/2020    CALCIUM 9.8 03/09/2020    ALBUMIN 4.50 03/09/2020    BILITOT 0.6 03/09/2020    ALKPHOS 119 (H) 03/09/2020    AST 20 03/09/2020    ALT 26 03/09/2020     Lipid Panel:  Lab Results   Component Value Date    CHOL 229 (H) 03/09/2020    TRIG 225 (H) 03/09/2020    HDL 42 03/09/2020    VLDL 45 (H) 03/09/2020     (H) 03/09/2020     HbA1c:  Lab Results   Component Value Date    HGBA1C 7.2 01/28/2021    HGBA1C 7.9 09/28/2020     Glucose:    Lab Results   Component Value Date    POCGLU 281 (A) 09/28/2020     Microalbumin:  Lab Results   Component Value Date    MALBCRERATIO 64.9 09/28/2020     TSH:  Lab Results   Component Value Date    TSH 0.977 03/09/2020       Assessment and plan  Diagnoses and all orders for this visit:    1. Type 2 diabetes mellitus with hyperglycemia, without long-term current use of insulin (CMS/[facility]) (Primary)  Improved and now mostly controlled with A1c at 7.2. I'd like his A1c down to 6.5.  His last urine microalbumin ratio was elevated.  Recheck at follow-up visit. Also has mild neuropathy due to diabetes.  Continue metformin 1000 mg twice daily, glipizide 10 mg daily, Actos 30 mg daily.  Patient was previous on Jardiance but did not tolerate due to UTI/yeast infection symptoms.  Labs will be checked again 3/2021. Ophtho exam UTD from ~6/2020. Monofilament updated 6/2020.  -     POC Glycosylated Hemoglobin (Hb A1C)  -     glipizide (GLUCOTROL XL) 10 MG 24 hr tablet; Take 1 tablet by mouth Daily.  Dispense: 90 tablet; Refill: 1  -     pioglitazone (ACTOS) 30 MG tablet; Take 1 tablet by mouth Daily.  Dispense: 90 tablet; Refill: 1    2. Mixed hyperlipidemia  Rosuvastatin increase to 20 mg last year and is due for repeat lipids next month with PCP.   Target LDL  <70.    3. Overweight (BMI 25.0-29.9)  BMI 27.9. Has gained 16 lbs since his last visit. Weight loss is necessary for overall health and diabetes management. Encouraged to reduce high calorie food intake.     Return in about 3 months (around 4/28/2021) for next scheduled follow up. The patient was instructed to contact the clinic with any interval questions or concerns.    Annette Lau, DO   Endocrinologist    Please note that portions of this document were completed with a voice recognition program. Efforts were made to edit the dictations, but occasionally words are mis-transcribed.

## 2021-03-11 ENCOUNTER — OFFICE VISIT (OUTPATIENT)
Dept: INTERNAL MEDICINE | Facility: CLINIC | Age: 61
End: 2021-03-11

## 2021-03-11 ENCOUNTER — LAB (OUTPATIENT)
Dept: LAB | Facility: HOSPITAL | Age: 61
End: 2021-03-11

## 2021-03-11 VITALS
BODY MASS INDEX: 28.12 KG/M2 | DIASTOLIC BLOOD PRESSURE: 78 MMHG | SYSTOLIC BLOOD PRESSURE: 112 MMHG | TEMPERATURE: 97.1 F | OXYGEN SATURATION: 99 % | HEART RATE: 87 BPM | WEIGHT: 207.6 LBS | HEIGHT: 72 IN

## 2021-03-11 DIAGNOSIS — N52.9 ERECTILE DYSFUNCTION, UNSPECIFIED ERECTILE DYSFUNCTION TYPE: ICD-10-CM

## 2021-03-11 DIAGNOSIS — S49.91XA INJURY OF RIGHT SHOULDER, INITIAL ENCOUNTER: ICD-10-CM

## 2021-03-11 DIAGNOSIS — Z00.00 HEALTH CARE MAINTENANCE: ICD-10-CM

## 2021-03-11 DIAGNOSIS — E78.2 MIXED HYPERLIPIDEMIA: ICD-10-CM

## 2021-03-11 DIAGNOSIS — Z12.5 PROSTATE CANCER SCREENING: ICD-10-CM

## 2021-03-11 DIAGNOSIS — Z00.00 HEALTH CARE MAINTENANCE: Primary | ICD-10-CM

## 2021-03-11 DIAGNOSIS — Z12.11 COLON CANCER SCREENING: ICD-10-CM

## 2021-03-11 LAB
BASOPHILS # BLD AUTO: 0.09 10*3/MM3 (ref 0–0.2)
BASOPHILS NFR BLD AUTO: 1.1 % (ref 0–1.5)
DEPRECATED RDW RBC AUTO: 41.7 FL (ref 37–54)
EOSINOPHIL # BLD AUTO: 0.2 10*3/MM3 (ref 0–0.4)
EOSINOPHIL NFR BLD AUTO: 2.4 % (ref 0.3–6.2)
ERYTHROCYTE [DISTWIDTH] IN BLOOD BY AUTOMATED COUNT: 12.1 % (ref 12.3–15.4)
HCT VFR BLD AUTO: 51.6 % (ref 37.5–51)
HGB BLD-MCNC: 17.2 G/DL (ref 13–17.7)
IMM GRANULOCYTES # BLD AUTO: 0.03 10*3/MM3 (ref 0–0.05)
IMM GRANULOCYTES NFR BLD AUTO: 0.4 % (ref 0–0.5)
LYMPHOCYTES # BLD AUTO: 1.84 10*3/MM3 (ref 0.7–3.1)
LYMPHOCYTES NFR BLD AUTO: 22.1 % (ref 19.6–45.3)
MCH RBC QN AUTO: 31.3 PG (ref 26.6–33)
MCHC RBC AUTO-ENTMCNC: 33.3 G/DL (ref 31.5–35.7)
MCV RBC AUTO: 94 FL (ref 79–97)
MONOCYTES # BLD AUTO: 0.49 10*3/MM3 (ref 0.1–0.9)
MONOCYTES NFR BLD AUTO: 5.9 % (ref 5–12)
NEUTROPHILS NFR BLD AUTO: 5.69 10*3/MM3 (ref 1.7–7)
NEUTROPHILS NFR BLD AUTO: 68.1 % (ref 42.7–76)
NRBC BLD AUTO-RTO: 0.1 /100 WBC (ref 0–0.2)
PLATELET # BLD AUTO: 262 10*3/MM3 (ref 140–450)
PMV BLD AUTO: 10.8 FL (ref 6–12)
RBC # BLD AUTO: 5.49 10*6/MM3 (ref 4.14–5.8)
WBC # BLD AUTO: 8.34 10*3/MM3 (ref 3.4–10.8)

## 2021-03-11 PROCEDURE — 99396 PREV VISIT EST AGE 40-64: CPT | Performed by: PHYSICIAN ASSISTANT

## 2021-03-11 PROCEDURE — 80061 LIPID PANEL: CPT

## 2021-03-11 PROCEDURE — 99213 OFFICE O/P EST LOW 20 MIN: CPT | Performed by: PHYSICIAN ASSISTANT

## 2021-03-11 PROCEDURE — 84403 ASSAY OF TOTAL TESTOSTERONE: CPT

## 2021-03-11 PROCEDURE — 85025 COMPLETE CBC W/AUTO DIFF WBC: CPT

## 2021-03-11 PROCEDURE — 84443 ASSAY THYROID STIM HORMONE: CPT

## 2021-03-11 PROCEDURE — 36415 COLL VENOUS BLD VENIPUNCTURE: CPT

## 2021-03-11 PROCEDURE — 80053 COMPREHEN METABOLIC PANEL: CPT

## 2021-03-11 PROCEDURE — G0103 PSA SCREENING: HCPCS

## 2021-03-11 RX ORDER — ROSUVASTATIN CALCIUM 20 MG/1
20 TABLET, COATED ORAL DAILY
Qty: 90 TABLET | Refills: 3 | Status: SHIPPED | OUTPATIENT
Start: 2021-03-11 | End: 2022-03-14

## 2021-03-11 RX ORDER — CYCLOBENZAPRINE HCL 5 MG
5 TABLET ORAL 3 TIMES DAILY PRN
Qty: 30 TABLET | Refills: 0 | Status: SHIPPED | OUTPATIENT
Start: 2021-03-11 | End: 2021-11-10

## 2021-03-11 RX ORDER — TADALAFIL 10 MG/1
TABLET ORAL
Qty: 20 TABLET | Refills: 0 | Status: SHIPPED | OUTPATIENT
Start: 2021-03-11 | End: 2021-11-10

## 2021-03-11 NOTE — ASSESSMENT & PLAN NOTE
Immunizations: shingrix recommended  Eye exam: done 2018, recommended  Prostate exam: done today  PSA: ordered  Colonoscopy: cologuard ordered  Labs: fasting labs ordered    Counseled patient regarding multimodal approach with healthy nutrition, healthy sleep, regular physical activity, social activities, counseling, safety measures and medications.

## 2021-03-11 NOTE — PROGRESS NOTES
"Chief Complaint   Patient presents with   • Annual Exam       Subjective     History of Present Illness     Ceasar Hawk is a 60 y.o. male.     The patient is being seen for a health maintenance evaluation.  The last health maintenance was 1 year(s) ago.    Social History  Ceasar  does not smoke cigarettes.   He drinks no alcohol.  He does not use illicit drugs.    General History  Ceasar  does have regular dental visits.  He does complain of vision problems. Wears reading glasses. Last eye exam was 2020.  Immunizations are not up to date. The patient needs the following immunizations: shingrix recommended    Lifestyle  Ceasar  consumes a in general, a \"healthy\" diet  .  He exercises never.    Reproductive Health  Ceasar  is not sexually active. His contraceptive plan is no method.   He does have erectile dysfunction. Tried medication in the past,cannot remember which one.    Screening  Last PSA was 2020.  Last prostate exam was  2020. Family history of prostate cancer: none  Last testicular exam was 2020.   Last colonoscopy was 2018, repeat 2021. Family history of colon cancer: none  Other pertinent family history and/or screenings: none                Current Outpatient Medications:   •  cyclobenzaprine (FLEXERIL) 5 MG tablet, Take 1 tablet by mouth 3 (Three) Times a Day As Needed for Muscle Spasms., Disp: 30 tablet, Rfl: 0  •  glipizide (GLUCOTROL XL) 10 MG 24 hr tablet, Take 1 tablet by mouth Daily., Disp: 90 tablet, Rfl: 1  •  metFORMIN ER (GLUCOPHAGE-XR) 500 MG 24 hr tablet, Take 2 tablets by mouth 2 (two) times a day., Disp: 360 tablet, Rfl: 1  •  pioglitazone (ACTOS) 30 MG tablet, Take 1 tablet by mouth Daily., Disp: 90 tablet, Rfl: 1  •  rosuvastatin (CRESTOR) 20 MG tablet, Take 1 tablet by mouth Daily., Disp: 90 tablet, Rfl: 3  •  tadalafil (Cialis) 10 MG tablet, Take 1-2 tablets 1 hour prior to activity as needed for erectile dysfunction, Disp: 20 tablet, Rfl: 0     PMFSH  The following portions of the " "patient's history were reviewed and updated as appropriate: allergies, current medications, past family history, past medical history, past social history, past surgical history and problem list.    Review of Systems   Constitutional: Negative for appetite change, fever and unexpected weight change.   HENT: Negative for ear pain, facial swelling and sore throat.    Eyes: Negative for pain and visual disturbance.   Respiratory: Negative for chest tightness, shortness of breath and wheezing.    Cardiovascular: Negative for chest pain and palpitations.   Gastrointestinal: Negative for abdominal pain and blood in stool.   Endocrine: Negative.    Genitourinary: Negative for difficulty urinating and hematuria.   Musculoskeletal: Negative for joint swelling.   Neurological: Negative for dizziness, tremors, seizures, syncope and headaches.   Hematological: Negative for adenopathy.   Psychiatric/Behavioral: Negative.        Objective   /78   Pulse 87   Temp 97.1 °F (36.2 °C)   Ht 182.9 cm (72\")   Wt 94.2 kg (207 lb 9.6 oz)   SpO2 99%   BMI 28.16 kg/m²     Physical Exam  Vitals and nursing note reviewed.   Constitutional:       General: He is not in acute distress.     Appearance: He is well-developed. He is not diaphoretic.   HENT:      Head: Normocephalic and atraumatic. Hair is normal.      Right Ear: Hearing, tympanic membrane, ear canal and external ear normal.      Left Ear: Hearing, tympanic membrane, ear canal and external ear normal.      Nose: No nasal deformity.      Mouth/Throat:      Mouth: No oral lesions.      Pharynx: Uvula midline. No uvula swelling.   Eyes:      General: Lids are normal. No scleral icterus.        Right eye: No discharge.         Left eye: No discharge.      Extraocular Movements:      Right eye: Normal extraocular motion and no nystagmus.      Left eye: Normal extraocular motion and no nystagmus.      Conjunctiva/sclera: Conjunctivae normal.      Pupils: Pupils are equal, round, " and reactive to light.   Neck:      Thyroid: No thyromegaly.      Vascular: No JVD.      Trachea: No tracheal deviation.   Cardiovascular:      Rate and Rhythm: Normal rate and regular rhythm.      Pulses: Normal pulses.      Heart sounds: Normal heart sounds. No murmur. No gallop.    Pulmonary:      Effort: Pulmonary effort is normal. No respiratory distress.      Breath sounds: Normal breath sounds. No wheezing or rales.   Chest:      Chest wall: No tenderness.   Abdominal:      General: Bowel sounds are normal. There is no distension.      Palpations: Abdomen is soft. There is no mass.      Tenderness: There is no abdominal tenderness. There is no guarding.      Hernia: No hernia is present.   Genitourinary:     Penis: Normal.       Testes: Normal.      Prostate: Normal.      Rectum: Normal. Guaiac result negative.      Comments: Declined chaperone for  exam  Musculoskeletal:         General: No tenderness or deformity. Normal range of motion.      Cervical back: Normal range of motion and neck supple.   Lymphadenopathy:      Cervical: No cervical adenopathy.   Skin:     General: Skin is warm and dry.      Findings: No rash.   Neurological:      Mental Status: He is alert and oriented to person, place, and time.      Cranial Nerves: No cranial nerve deficit.      Motor: No abnormal muscle tone.      Coordination: Coordination normal.      Deep Tendon Reflexes: Reflexes are normal and symmetric. Reflexes normal.   Psychiatric:         Behavior: Behavior normal.         Thought Content: Thought content normal.         Judgment: Judgment normal.         Results for orders placed or performed in visit on 01/28/21   POC Glycosylated Hemoglobin (Hb A1C)    Specimen: Blood   Result Value Ref Range    Hemoglobin A1C 7.2 %    Lot Number [id number]     Expiration Date 10/13/22         ASSESSMENT/PLAN    Diagnoses and all orders for this visit:    1. Health care maintenance (Primary)  Assessment & Plan:  Immunizations:  shingrix recommended  Eye exam: done 2018, recommended  Prostate exam: done today  PSA: ordered  Colonoscopy: cologuard ordered  Labs: fasting labs ordered    Counseled patient regarding multimodal approach with healthy nutrition, healthy sleep, regular physical activity, social activities, counseling, safety measures and medications.       Orders:  -     CBC & Differential; Future  -     Comprehensive Metabolic Panel; Future  -     Lipid Panel; Future  -     TSH; Future    2. Prostate cancer screening  -     PSA Screen; Future    3. Colon cancer screening  -     Cologuard - Stool, Per Rectum; Future    4. Injury of right shoulder, initial encounter  Comments:  Continue prn flexeril.  Orders:  -     cyclobenzaprine (FLEXERIL) 5 MG tablet; Take 1 tablet by mouth 3 (Three) Times a Day As Needed for Muscle Spasms.  Dispense: 30 tablet; Refill: 0    5. Mixed hyperlipidemia  -     rosuvastatin (CRESTOR) 20 MG tablet; Take 1 tablet by mouth Daily.  Dispense: 90 tablet; Refill: 3    6. Erectile dysfunction, unspecified erectile dysfunction type  Comments:  Check testosterone. Use Cialis prn.  Orders:  -     Testosterone; Future    Other orders  -     tadalafil (Cialis) 10 MG tablet; Take 1-2 tablets 1 hour prior to activity as needed for erectile dysfunction  Dispense: 20 tablet; Refill: 0           Return in about 1 year (around 3/11/2022) for Annual with fasting labs.

## 2021-03-12 LAB
ALBUMIN SERPL-MCNC: 4.7 G/DL (ref 3.5–5.2)
ALBUMIN/GLOB SERPL: 1.5 G/DL
ALP SERPL-CCNC: 125 U/L (ref 39–117)
ALT SERPL W P-5'-P-CCNC: 32 U/L (ref 1–41)
ANION GAP SERPL CALCULATED.3IONS-SCNC: 14.4 MMOL/L (ref 5–15)
AST SERPL-CCNC: 22 U/L (ref 1–40)
BILIRUB SERPL-MCNC: 0.6 MG/DL (ref 0–1.2)
BUN SERPL-MCNC: 14 MG/DL (ref 8–23)
BUN/CREAT SERPL: 13 (ref 7–25)
CALCIUM SPEC-SCNC: 9.7 MG/DL (ref 8.6–10.5)
CHLORIDE SERPL-SCNC: 101 MMOL/L (ref 98–107)
CHOLEST SERPL-MCNC: 142 MG/DL (ref 0–200)
CO2 SERPL-SCNC: 23.6 MMOL/L (ref 22–29)
CREAT SERPL-MCNC: 1.08 MG/DL (ref 0.76–1.27)
GFR SERPL CREATININE-BSD FRML MDRD: 69 ML/MIN/1.73
GLOBULIN UR ELPH-MCNC: 3.2 GM/DL
GLUCOSE SERPL-MCNC: 135 MG/DL (ref 65–99)
HDLC SERPL-MCNC: 46 MG/DL (ref 40–60)
LDLC SERPL CALC-MCNC: 65 MG/DL (ref 0–100)
LDLC/HDLC SERPL: 1.28 {RATIO}
POTASSIUM SERPL-SCNC: 4.4 MMOL/L (ref 3.5–5.2)
PROT SERPL-MCNC: 7.9 G/DL (ref 6–8.5)
PSA SERPL-MCNC: 0.97 NG/ML (ref 0–4)
SODIUM SERPL-SCNC: 139 MMOL/L (ref 136–145)
TESTOST SERPL-MCNC: 456 NG/DL (ref 193–740)
TRIGL SERPL-MCNC: 185 MG/DL (ref 0–150)
TSH SERPL DL<=0.05 MIU/L-ACNC: 1.37 UIU/ML (ref 0.27–4.2)
VLDLC SERPL-MCNC: 31 MG/DL (ref 5–40)

## 2021-03-12 NOTE — PROGRESS NOTES
Your labs show improvement of your cholesterol and blood sugars. Everything else is stable and looks fine! Continue your current medications.

## 2021-04-14 RX ORDER — METFORMIN HYDROCHLORIDE 500 MG/1
1000 TABLET, EXTENDED RELEASE ORAL 2 TIMES DAILY
Qty: 360 TABLET | Refills: 1 | Status: SHIPPED | OUTPATIENT
Start: 2021-04-14 | End: 2021-10-22

## 2021-04-14 NOTE — TELEPHONE ENCOUNTER
Last Office Visit: 3/11/21  Next Office Visit: 3/14/22    Labs completed in past 6 months? yes  Labs completed in past year? yes    Last Refill Date: 8/4/20  Quantity: 360 tab  Refills: 1    Pharmacy: On file    Please review pended refill request for any changes needed on refills or quantities. Thank you!

## 2021-04-14 NOTE — TELEPHONE ENCOUNTER
Caller: Ceasar Hawk    Relationship: Self    Best call back number: [phone]    Medication needed:   Requested Prescriptions     Pending Prescriptions Disp Refills   • metFORMIN ER (GLUCOPHAGE-XR) 500 MG 24 hr tablet 360 tablet 1     Sig: Take 2 tablets by mouth 2 (two) times a day.       When do you need the refill by: ASAP    What additional details did the patient provide when requesting the medication: PATIENT STATES THAT HE HAS BEEN OUT SINCE Sunday.    Does the patient have less than a 3 day supply:  [x] Yes  [] No    What is the patient's preferred pharmacy: [id number] - [address] - [phone] [facility] [phone] FX

## 2021-04-29 ENCOUNTER — OFFICE VISIT (OUTPATIENT)
Dept: ENDOCRINOLOGY | Facility: CLINIC | Age: 61
End: 2021-04-29

## 2021-04-29 VITALS
BODY MASS INDEX: 28.58 KG/M2 | WEIGHT: 211 LBS | HEART RATE: 78 BPM | HEIGHT: 72 IN | SYSTOLIC BLOOD PRESSURE: 128 MMHG | DIASTOLIC BLOOD PRESSURE: 76 MMHG | OXYGEN SATURATION: 96 % | TEMPERATURE: 97.1 F

## 2021-04-29 DIAGNOSIS — E11.65 TYPE 2 DIABETES MELLITUS WITH HYPERGLYCEMIA, WITHOUT LONG-TERM CURRENT USE OF INSULIN (HCC): Primary | ICD-10-CM

## 2021-04-29 DIAGNOSIS — E66.3 OVERWEIGHT (BMI 25.0-29.9): ICD-10-CM

## 2021-04-29 DIAGNOSIS — E78.2 MIXED HYPERLIPIDEMIA: ICD-10-CM

## 2021-04-29 LAB
EXPIRATION DATE: NORMAL
HBA1C MFR BLD: 8 %
Lab: NORMAL

## 2021-04-29 PROCEDURE — 83036 HEMOGLOBIN GLYCOSYLATED A1C: CPT | Performed by: INTERNAL MEDICINE

## 2021-04-29 PROCEDURE — 99214 OFFICE O/P EST MOD 30 MIN: CPT | Performed by: INTERNAL MEDICINE

## 2021-04-29 RX ORDER — ORAL SEMAGLUTIDE 3 MG/1
3 TABLET ORAL
Qty: 30 TABLET | Refills: 5 | Status: SHIPPED | OUTPATIENT
Start: 2021-04-29 | End: 2021-07-09 | Stop reason: SDUPTHER

## 2021-04-29 NOTE — PROGRESS NOTES
Chief Complaint   Patient presents with   • Diabetes          HPI   Ceasar Hawk is a 60 y.o. male had concerns including Diabetes.    He is checking blood sugars 1 times per day.  BG's range in the 200s.  Current medications for diabetes include metformin, glipizide 10 mg daily, Actos 30 mg daily.  History of intolerance to SGLT2 inhibitor due to recurrent yeast infections.    Weight has increased another 3 pounds since his last visit here.  He is drinking regular soda, 3 to 4/day.    Labs were updated in March.  Lipids much improved.    No personal history of pancreatitis no family history of MEN or MTC.  He has not been on a GLP-1 agonist in the past.    The following portions of the patient's history were reviewed and updated as appropriate: allergies, current medications, past family history, past medical history, past social history, past surgical history and problem list.      Review of Systems   Constitutional: Positive for unexpected weight gain.   Endocrine:        See HPI        Physical Exam  Vitals reviewed.   Constitutional:       Appearance: Normal appearance.   Cardiovascular:      Rate and Rhythm: Normal rate.      Pulses:           Dorsalis pedis pulses are 2+ on the right side and 2+ on the left side.   Pulmonary:      Effort: Pulmonary effort is normal.   Feet:      Right foot:      Skin integrity: Skin integrity normal.      Toenail Condition: Right toenails are normal.      Left foot:      Skin integrity: Skin integrity normal.      Toenail Condition: Left toenails are normal.      Comments: Monofilament 5/5 bilaterally  Neurological:      General: No focal deficit present.      Mental Status: He is alert. Mental status is at baseline.   Psychiatric:         Mood and Affect: Mood normal.         Behavior: Behavior normal.      Diabetic Foot Exam Performed and Monofilament Test Performed    /76 (BP Location: Left arm, Patient Position: Sitting, Cuff Size: Adult)   Pulse 78   Temp 97.1 °F  "(36.2 °C) (Infrared)   Ht 182.9 cm (72\")   Wt 95.7 kg (211 lb)   SpO2 96%   BMI 28.62 kg/m²      Labs and imaging    CMP:  Lab Results   Component Value Date    BUN 14 03/11/2021    CREATININE 1.08 03/11/2021    EGFRIFNONA 69 03/11/2021    BCR 13.0 03/11/2021     03/11/2021    K 4.4 03/11/2021    CO2 23.6 03/11/2021    CALCIUM 9.7 03/11/2021    ALBUMIN 4.70 03/11/2021    BILITOT 0.6 03/11/2021    ALKPHOS 125 (H) 03/11/2021    AST 22 03/11/2021    ALT 32 03/11/2021     Lipid Panel:  Lab Results   Component Value Date    CHOL 142 03/11/2021    TRIG 185 (H) 03/11/2021    HDL 46 03/11/2021    VLDL 31 03/11/2021    LDL 65 03/11/2021     HbA1c:  Lab Results   Component Value Date    HGBA1C 8.0 04/29/2021    HGBA1C 7.2 01/28/2021     Glucose:    Lab Results   Component Value Date    POCGLU 281 (A) 09/28/2020     Microalbumin:  Lab Results   Component Value Date    MALBCRERATIO 64.9 09/28/2020     TSH:  Lab Results   Component Value Date    TSH 1.370 03/11/2021       Assessment and plan  Diagnoses and all orders for this visit:    1. Type 2 diabetes mellitus with hyperglycemia, without long-term current use of insulin (CMS/[facility]) (Primary)  Uncontrolled with A1c up to 8.0.  With recent hyperglycemia.  Complicated by mild neuropathy.  Continue metformin, glipizide 10 mg daily, Actos 30 mg daily.  Add Rybelsus 3 mg daily and titrate up monthly as tolerated.  He must eliminate regular soda (currently drinking 3 to 4/day).  If he discontinues regular soda, with the addition of Rybelsus and titrating up we may be able to titrate down/eliminate glipizide.  Labs are up-to-date from March 2021.  Urine microalbumin was updated September 2020.  Monofilament updated today.  Ophtho exam up-to-date from last summer.  -     POC Glycosylated Hemoglobin (Hb A1C)  -     Semaglutide (Rybelsus) 3 MG tablet; Take 3 mg by mouth Every Morning Before Breakfast.  Dispense: 30 tablet; Refill: 5    2. Mixed hyperlipidemia  LDL now at goal on " rosuvastatin 20 mg daily.  Monitor yearly.    3. Overweight (BMI 25.0-29.9)  BMI up to 28.6.  Weight loss is necessary for overall health and diabetes management.  Hopefully the addition of Rybelsus and discontinuation of regular sweetened beverages will aid in weight loss.       Return in about 3 months (around 7/29/2021) for next scheduled follow up. The patient was instructed to contact the clinic with any interval questions or concerns.    Annette Lau, DO   Endocrinologist    Please note that portions of this document were completed with a voice recognition program. Efforts were made to edit the dictations, but occasionally words are mis-transcribed.

## 2021-07-08 ENCOUNTER — PRIOR AUTHORIZATION (OUTPATIENT)
Dept: ENDOCRINOLOGY | Facility: CLINIC | Age: 61
End: 2021-07-08

## 2021-07-08 DIAGNOSIS — E11.65 TYPE 2 DIABETES MELLITUS WITH HYPERGLYCEMIA, WITHOUT LONG-TERM CURRENT USE OF INSULIN (HCC): ICD-10-CM

## 2021-07-08 NOTE — TELEPHONE ENCOUNTER
Ceasar Hawk (Mann: [id number])  Rybelsus 3MG tablets     Form  Med[employer] ePA Form 2017 NCPDP  Created  6 days ago  Sent to Plan  6 days ago  Plan Response  6 days ago  Submit Clinical Questions  6 days ago  Determination  Unknown  2 days ago  Message from Plan  Prior Authorization is not required for this medication dosage form and strength at the quantity and days supply requested. This medication does not require a prior authorization because it is a covered benefit. This medication must be filled at [facility].    LMOM FOR PT. TO CALL OFFICE. JULIET

## 2021-07-09 RX ORDER — ORAL SEMAGLUTIDE 3 MG/1
3 TABLET ORAL
Qty: 30 TABLET | Refills: 5 | Status: SHIPPED | OUTPATIENT
Start: 2021-07-09 | End: 2021-07-29

## 2021-07-09 NOTE — TELEPHONE ENCOUNTER
PATIENT IS AGREEABLE WITH HAVING RX FILLED [facility]. HE STATES HIS SPOUSE IS WORKING TODAY AND CAN PICK IT UP BEFORE LEAVING.

## 2021-07-20 DIAGNOSIS — E11.65 TYPE 2 DIABETES MELLITUS WITH HYPERGLYCEMIA, WITHOUT LONG-TERM CURRENT USE OF INSULIN (HCC): ICD-10-CM

## 2021-07-20 RX ORDER — GLIPIZIDE 10 MG/1
10 TABLET, FILM COATED, EXTENDED RELEASE ORAL DAILY
Qty: 90 TABLET | Refills: 1 | Status: ON HOLD | OUTPATIENT
Start: 2021-07-20 | End: 2021-08-11

## 2021-07-29 ENCOUNTER — OFFICE VISIT (OUTPATIENT)
Dept: ENDOCRINOLOGY | Facility: CLINIC | Age: 61
End: 2021-07-29

## 2021-07-29 VITALS
DIASTOLIC BLOOD PRESSURE: 70 MMHG | WEIGHT: 198 LBS | SYSTOLIC BLOOD PRESSURE: 126 MMHG | HEIGHT: 72 IN | OXYGEN SATURATION: 97 % | BODY MASS INDEX: 26.82 KG/M2 | HEART RATE: 84 BPM

## 2021-07-29 DIAGNOSIS — E66.3 OVERWEIGHT (BMI 25.0-29.9): ICD-10-CM

## 2021-07-29 DIAGNOSIS — E78.2 MIXED HYPERLIPIDEMIA: ICD-10-CM

## 2021-07-29 DIAGNOSIS — E11.65 TYPE 2 DIABETES MELLITUS WITH HYPERGLYCEMIA, WITHOUT LONG-TERM CURRENT USE OF INSULIN (HCC): Primary | ICD-10-CM

## 2021-07-29 LAB
EXPIRATION DATE: ABNORMAL
EXPIRATION DATE: NORMAL
GLUCOSE BLDC GLUCOMTR-MCNC: 251 MG/DL (ref 70–130)
HBA1C MFR BLD: 8.4 %
Lab: ABNORMAL
Lab: NORMAL

## 2021-07-29 PROCEDURE — 99214 OFFICE O/P EST MOD 30 MIN: CPT | Performed by: INTERNAL MEDICINE

## 2021-07-29 PROCEDURE — 82947 ASSAY GLUCOSE BLOOD QUANT: CPT | Performed by: INTERNAL MEDICINE

## 2021-07-29 PROCEDURE — 83036 HEMOGLOBIN GLYCOSYLATED A1C: CPT | Performed by: INTERNAL MEDICINE

## 2021-07-29 RX ORDER — PIOGLITAZONEHYDROCHLORIDE 30 MG/1
30 TABLET ORAL DAILY
Qty: 90 TABLET | Refills: 1 | Status: SHIPPED | OUTPATIENT
Start: 2021-07-29 | End: 2021-10-22

## 2021-07-29 RX ORDER — ORAL SEMAGLUTIDE 7 MG/1
7 TABLET ORAL
Qty: 30 TABLET | Refills: 5 | Status: SHIPPED | OUTPATIENT
Start: 2021-07-29 | End: 2021-07-29 | Stop reason: SDUPTHER

## 2021-07-29 RX ORDER — ORAL SEMAGLUTIDE 7 MG/1
7 TABLET ORAL
Qty: 30 TABLET | Refills: 5 | Status: SHIPPED | OUTPATIENT
Start: 2021-07-29 | End: 2021-08-14 | Stop reason: HOSPADM

## 2021-07-29 NOTE — PROGRESS NOTES
"Chief Complaint   Patient presents with   • Diabetes          HPI   Ceasar Hawk is a 60 y.o. male had concerns including Diabetes.    He is checking blood sugars every other day. BGs in the 200s.  Current medications for diabetes include metformin 1000 mg twice daily, glipizide 10 mg daily, Actos 30 mg daily, Rybelsus 3 mg daily.  Started the rybelsus about 3-4 weeks ago. Tolerating without issue.     Has significantly cut back on regular soda, drinking 1 every few days.    He is on rosuvastatin 20 mg daily with last lipids in March.    Has lost 13 pounds since his last visit here in March.    Last ophtho exam was 1 year ago.     The following portions of the patient's history were reviewed and updated as appropriate: allergies, current medications, past family history, past medical history, past social history, past surgical history and problem list.      Review of Systems   Constitutional: Negative.    Endocrine:        See HPI        Physical Exam  Vitals reviewed.   Constitutional:       Appearance: Normal appearance.   Cardiovascular:      Rate and Rhythm: Normal rate.      Pulses: Normal pulses.   Pulmonary:      Effort: Pulmonary effort is normal.   Neurological:      General: No focal deficit present.      Mental Status: He is alert. Mental status is at baseline.   Psychiatric:         Mood and Affect: Mood normal.         Behavior: Behavior normal.        /70 (BP Location: Left arm, Patient Position: Sitting, Cuff Size: Adult)   Pulse 84   Ht 182.9 cm (72\")   Wt 89.8 kg (198 lb)   SpO2 97%   BMI 26.85 kg/m²      Labs and imaging    CMP:  Lab Results   Component Value Date    BUN 14 03/11/2021    CREATININE 1.08 03/11/2021    EGFRIFNONA 69 03/11/2021    BCR 13.0 03/11/2021     03/11/2021    K 4.4 03/11/2021    CO2 23.6 03/11/2021    CALCIUM 9.7 03/11/2021    ALBUMIN 4.70 03/11/2021    BILITOT 0.6 03/11/2021    ALKPHOS 125 (H) 03/11/2021    AST 22 03/11/2021    ALT 32 03/11/2021     Lipid " Panel:  Lab Results   Component Value Date    CHOL 142 03/11/2021    TRIG 185 (H) 03/11/2021    HDL 46 03/11/2021    VLDL 31 03/11/2021    LDL 65 03/11/2021     HbA1c:  Lab Results   Component Value Date    HGBA1C 8.4 07/29/2021    HGBA1C 8.0 04/29/2021     Glucose:    Lab Results   Component Value Date    POCGLU 251 (A) 07/29/2021     Microalbumin:  Lab Results   Component Value Date    MALBCRERATIO 64.9 09/28/2020     TSH:  Lab Results   Component Value Date    TSH 1.370 03/11/2021       Assessment and plan  Diagnoses and all orders for this visit:    1. Type 2 diabetes mellitus with hyperglycemia, without long-term current use of insulin (CMS/[facility]) (Primary)  Uncontrolled with persistent hyperglycemia, A1c up to 8.4 today despite weight loss addition of Rybelsus.  Discontinue regular soda.  Increase Rybelsus to 7 mg daily.  Continue metformin 1000 mg twice daily, glipizide 10 mg daily, Actos 30 mg daily.  Labs up-to-date from March 2021.  Urine microalbumin up-to-date from September 2021.  Ophtho exam is due and patient was encouraged to schedule.  Monofilament was updated April 2021.  -     POC Glycosylated Hemoglobin (Hb A1C)  -     POC Glucose, Blood  -     Semaglutide (Rybelsus) 7 MG tablet; Take 7 mg by mouth Every Morning Before Breakfast.  Dispense: 30 tablet; Refill: 5    2. Mixed hyperlipidemia  Controlled on rosuvastatin 20 mg daily with last LDL 65 in March.  Monitor yearly.    3. Overweight (BMI 25.0-29.9)  BMI down to 26.8 after 13 pound weight loss since his last visit here.  Congratulated on weight loss and encouraged to continue efforts.       Return in about 3 months (around 10/29/2021) for next scheduled follow up. The patient was instructed to contact the clinic with any interval questions or concerns.    Annette Lau,    Endocrinologist    Please note that portions of this document were completed with a voice recognition program. Efforts were made to edit the dictations, but occasionally  words are mis-transcribed.

## 2021-08-11 ENCOUNTER — APPOINTMENT (OUTPATIENT)
Dept: GENERAL RADIOLOGY | Facility: HOSPITAL | Age: 61
End: 2021-08-11

## 2021-08-11 ENCOUNTER — HOSPITAL ENCOUNTER (INPATIENT)
Facility: HOSPITAL | Age: 61
LOS: 3 days | Discharge: HOME | End: 2021-08-14
Attending: EMERGENCY MEDICINE | Admitting: INTERNAL MEDICINE

## 2021-08-11 ENCOUNTER — APPOINTMENT (OUTPATIENT)
Dept: CT IMAGING | Facility: HOSPITAL | Age: 61
End: 2021-08-11

## 2021-08-11 DIAGNOSIS — E86.0 ACUTE DEHYDRATION: ICD-10-CM

## 2021-08-11 DIAGNOSIS — R74.8 ELEVATED LIPASE: ICD-10-CM

## 2021-08-11 DIAGNOSIS — N17.9 ACUTE RENAL FAILURE, UNSPECIFIED ACUTE RENAL FAILURE TYPE (HCC): Primary | ICD-10-CM

## 2021-08-11 PROBLEM — D72.829 LEUKOCYTOSIS: Status: ACTIVE | Noted: 2021-08-11

## 2021-08-11 LAB
ALBUMIN SERPL-MCNC: 4.6 G/DL (ref 3.5–5.2)
ALBUMIN/GLOB SERPL: 1.2 G/DL
ALP SERPL-CCNC: 103 U/L (ref 39–117)
ALT SERPL W P-5'-P-CCNC: 7 U/L (ref 1–41)
ANION GAP SERPL CALCULATED.3IONS-SCNC: 17 MMOL/L (ref 5–15)
ANION GAP SERPL CALCULATED.3IONS-SCNC: 18 MMOL/L (ref 5–15)
AST SERPL-CCNC: 17 U/L (ref 1–40)
BACTERIA UR QL AUTO: ABNORMAL /HPF
BASOPHILS # BLD AUTO: 0.07 10*3/MM3 (ref 0–0.2)
BASOPHILS NFR BLD AUTO: 0.6 % (ref 0–1.5)
BILIRUB SERPL-MCNC: 0.3 MG/DL (ref 0–1.2)
BILIRUB UR QL STRIP: NEGATIVE
BUN SERPL-MCNC: 63 MG/DL (ref 8–23)
BUN SERPL-MCNC: 67 MG/DL (ref 8–23)
BUN/CREAT SERPL: 8.3 (ref 7–25)
BUN/CREAT SERPL: 9.4 (ref 7–25)
CALCIUM SPEC-SCNC: 10.3 MG/DL (ref 8.6–10.5)
CALCIUM SPEC-SCNC: 9.2 MG/DL (ref 8.6–10.5)
CHLORIDE SERPL-SCNC: 103 MMOL/L (ref 98–107)
CHLORIDE SERPL-SCNC: 97 MMOL/L (ref 98–107)
CK SERPL-CCNC: 20 U/L (ref 20–200)
CLARITY UR: CLEAR
CO2 SERPL-SCNC: 18 MMOL/L (ref 22–29)
CO2 SERPL-SCNC: 20 MMOL/L (ref 22–29)
COLOR UR: YELLOW
CREAT SERPL-MCNC: 6.69 MG/DL (ref 0.76–1.27)
CREAT SERPL-MCNC: 8.1 MG/DL (ref 0.76–1.27)
DEPRECATED RDW RBC AUTO: 40.3 FL (ref 37–54)
EOSINOPHIL # BLD AUTO: 1.15 10*3/MM3 (ref 0–0.4)
EOSINOPHIL NFR BLD AUTO: 10.6 % (ref 0.3–6.2)
ERYTHROCYTE [DISTWIDTH] IN BLOOD BY AUTOMATED COUNT: 12.2 % (ref 12.3–15.4)
FLUAV RNA RESP QL NAA+PROBE: NOT DETECTED
FLUBV RNA RESP QL NAA+PROBE: NOT DETECTED
GFR SERPL CREATININE-BSD FRML MDRD: 7 ML/MIN/1.73
GFR SERPL CREATININE-BSD FRML MDRD: 8 ML/MIN/1.73
GFR SERPL CREATININE-BSD FRML MDRD: ABNORMAL ML/MIN/{1.73_M2}
GFR SERPL CREATININE-BSD FRML MDRD: ABNORMAL ML/MIN/{1.73_M2}
GLOBULIN UR ELPH-MCNC: 3.7 GM/DL
GLUCOSE BLDC GLUCOMTR-MCNC: 153 MG/DL (ref 70–130)
GLUCOSE SERPL-MCNC: 163 MG/DL (ref 65–99)
GLUCOSE SERPL-MCNC: 170 MG/DL (ref 65–99)
GLUCOSE UR STRIP-MCNC: ABNORMAL MG/DL
HCT VFR BLD AUTO: 45.3 % (ref 37.5–51)
HGB BLD-MCNC: 15.2 G/DL (ref 13–17.7)
HGB UR QL STRIP.AUTO: ABNORMAL
HOLD SPECIMEN: NORMAL
HYALINE CASTS UR QL AUTO: ABNORMAL /LPF
IMM GRANULOCYTES # BLD AUTO: 0.05 10*3/MM3 (ref 0–0.05)
IMM GRANULOCYTES NFR BLD AUTO: 0.5 % (ref 0–0.5)
KETONES UR QL STRIP: ABNORMAL
LEUKOCYTE ESTERASE UR QL STRIP.AUTO: NEGATIVE
LIPASE SERPL-CCNC: 223 U/L (ref 13–60)
LYMPHOCYTES # BLD AUTO: 1.64 10*3/MM3 (ref 0.7–3.1)
LYMPHOCYTES NFR BLD AUTO: 15.1 % (ref 19.6–45.3)
MCH RBC QN AUTO: 30.5 PG (ref 26.6–33)
MCHC RBC AUTO-ENTMCNC: 33.6 G/DL (ref 31.5–35.7)
MCV RBC AUTO: 91 FL (ref 79–97)
MONOCYTES # BLD AUTO: 0.74 10*3/MM3 (ref 0.1–0.9)
MONOCYTES NFR BLD AUTO: 6.8 % (ref 5–12)
NEUTROPHILS NFR BLD AUTO: 66.4 % (ref 42.7–76)
NEUTROPHILS NFR BLD AUTO: 7.24 10*3/MM3 (ref 1.7–7)
NITRITE UR QL STRIP: NEGATIVE
NRBC BLD AUTO-RTO: 0 /100 WBC (ref 0–0.2)
NT-PROBNP SERPL-MCNC: 309.6 PG/ML (ref 0–900)
PH UR STRIP.AUTO: <=5 [PH] (ref 5–8)
PLATELET # BLD AUTO: 271 10*3/MM3 (ref 140–450)
PMV BLD AUTO: 10 FL (ref 6–12)
POTASSIUM SERPL-SCNC: 4.4 MMOL/L (ref 3.5–5.2)
POTASSIUM SERPL-SCNC: 4.9 MMOL/L (ref 3.5–5.2)
PROT SERPL-MCNC: 8.3 G/DL (ref 6–8.5)
PROT UR QL STRIP: ABNORMAL
RBC # BLD AUTO: 4.98 10*6/MM3 (ref 4.14–5.8)
RBC # UR: ABNORMAL /HPF
REF LAB TEST METHOD: ABNORMAL
SARS-COV-2 RNA RESP QL NAA+PROBE: NOT DETECTED
SODIUM SERPL-SCNC: 135 MMOL/L (ref 136–145)
SODIUM SERPL-SCNC: 138 MMOL/L (ref 136–145)
SP GR UR STRIP: 1.01 (ref 1–1.03)
SQUAMOUS #/AREA URNS HPF: ABNORMAL /HPF
TROPONIN T SERPL-MCNC: 0.01 NG/ML (ref 0–0.03)
TROPONIN T SERPL-MCNC: <0.01 NG/ML (ref 0–0.03)
UROBILINOGEN UR QL STRIP: ABNORMAL
WBC # BLD AUTO: 10.89 10*3/MM3 (ref 3.4–10.8)
WBC UR QL AUTO: ABNORMAL /HPF
WHOLE BLOOD HOLD SPECIMEN: NORMAL

## 2021-08-11 PROCEDURE — 83690 ASSAY OF LIPASE: CPT

## 2021-08-11 PROCEDURE — 99284 EMERGENCY DEPT VISIT MOD MDM: CPT

## 2021-08-11 PROCEDURE — 87636 SARSCOV2 & INF A&B AMP PRB: CPT | Performed by: EMERGENCY MEDICINE

## 2021-08-11 PROCEDURE — 71046 X-RAY EXAM CHEST 2 VIEWS: CPT

## 2021-08-11 PROCEDURE — 70450 CT HEAD/BRAIN W/O DYE: CPT

## 2021-08-11 PROCEDURE — 80048 BASIC METABOLIC PNL TOTAL CA: CPT | Performed by: NURSE PRACTITIONER

## 2021-08-11 PROCEDURE — 85025 COMPLETE CBC W/AUTO DIFF WBC: CPT

## 2021-08-11 PROCEDURE — 82962 GLUCOSE BLOOD TEST: CPT

## 2021-08-11 PROCEDURE — 25010000002 ONDANSETRON PER 1 MG: Performed by: NURSE PRACTITIONER

## 2021-08-11 PROCEDURE — 25010000002 ONDANSETRON PER 1 MG

## 2021-08-11 PROCEDURE — 84484 ASSAY OF TROPONIN QUANT: CPT | Performed by: EMERGENCY MEDICINE

## 2021-08-11 PROCEDURE — 82550 ASSAY OF CK (CPK): CPT | Performed by: EMERGENCY MEDICINE

## 2021-08-11 PROCEDURE — 83880 ASSAY OF NATRIURETIC PEPTIDE: CPT

## 2021-08-11 PROCEDURE — 93005 ELECTROCARDIOGRAM TRACING: CPT

## 2021-08-11 PROCEDURE — 81001 URINALYSIS AUTO W/SCOPE: CPT | Performed by: EMERGENCY MEDICINE

## 2021-08-11 PROCEDURE — 93005 ELECTROCARDIOGRAM TRACING: CPT | Performed by: EMERGENCY MEDICINE

## 2021-08-11 PROCEDURE — 80053 COMPREHEN METABOLIC PANEL: CPT

## 2021-08-11 PROCEDURE — 99223 1ST HOSP IP/OBS HIGH 75: CPT | Performed by: INTERNAL MEDICINE

## 2021-08-11 PROCEDURE — 84300 ASSAY OF URINE SODIUM: CPT | Performed by: NURSE PRACTITIONER

## 2021-08-11 PROCEDURE — 74176 CT ABD & PELVIS W/O CONTRAST: CPT

## 2021-08-11 PROCEDURE — 84484 ASSAY OF TROPONIN QUANT: CPT

## 2021-08-11 PROCEDURE — 82570 ASSAY OF URINE CREATININE: CPT | Performed by: NURSE PRACTITIONER

## 2021-08-11 RX ORDER — ONDANSETRON 2 MG/ML
4 INJECTION INTRAMUSCULAR; INTRAVENOUS ONCE
Status: COMPLETED | OUTPATIENT
Start: 2021-08-11 | End: 2021-08-11

## 2021-08-11 RX ORDER — NICOTINE POLACRILEX 4 MG
15 LOZENGE BUCCAL
Status: DISCONTINUED | OUTPATIENT
Start: 2021-08-11 | End: 2021-08-14 | Stop reason: HOSPADM

## 2021-08-11 RX ORDER — HYDROCODONE BITARTRATE AND ACETAMINOPHEN 5; 325 MG/1; MG/1
1 TABLET ORAL EVERY 6 HOURS PRN
Status: DISCONTINUED | OUTPATIENT
Start: 2021-08-11 | End: 2021-08-14 | Stop reason: HOSPADM

## 2021-08-11 RX ORDER — ONDANSETRON 2 MG/ML
4 INJECTION INTRAMUSCULAR; INTRAVENOUS EVERY 6 HOURS PRN
Status: DISCONTINUED | OUTPATIENT
Start: 2021-08-11 | End: 2021-08-11 | Stop reason: SDUPTHER

## 2021-08-11 RX ORDER — ONDANSETRON 2 MG/ML
4 INJECTION INTRAMUSCULAR; INTRAVENOUS EVERY 6 HOURS PRN
Status: DISCONTINUED | OUTPATIENT
Start: 2021-08-11 | End: 2021-08-14 | Stop reason: HOSPADM

## 2021-08-11 RX ORDER — SODIUM CHLORIDE 0.9 % (FLUSH) 0.9 %
10 SYRINGE (ML) INJECTION AS NEEDED
Status: DISCONTINUED | OUTPATIENT
Start: 2021-08-11 | End: 2021-08-14 | Stop reason: HOSPADM

## 2021-08-11 RX ORDER — DEXTROSE MONOHYDRATE 25 G/50ML
25 INJECTION, SOLUTION INTRAVENOUS
Status: DISCONTINUED | OUTPATIENT
Start: 2021-08-11 | End: 2021-08-14 | Stop reason: HOSPADM

## 2021-08-11 RX ORDER — ONDANSETRON 4 MG/1
4 TABLET, FILM COATED ORAL EVERY 6 HOURS PRN
Status: DISCONTINUED | OUTPATIENT
Start: 2021-08-11 | End: 2021-08-14 | Stop reason: HOSPADM

## 2021-08-11 RX ORDER — SODIUM CHLORIDE 9 MG/ML
75 INJECTION, SOLUTION INTRAVENOUS CONTINUOUS
Status: DISCONTINUED | OUTPATIENT
Start: 2021-08-11 | End: 2021-08-12

## 2021-08-11 RX ORDER — ACETAMINOPHEN 325 MG/1
650 TABLET ORAL EVERY 6 HOURS PRN
Status: DISCONTINUED | OUTPATIENT
Start: 2021-08-11 | End: 2021-08-14 | Stop reason: HOSPADM

## 2021-08-11 RX ORDER — ONDANSETRON 2 MG/ML
INJECTION INTRAMUSCULAR; INTRAVENOUS
Status: COMPLETED
Start: 2021-08-11 | End: 2021-08-11

## 2021-08-11 RX ADMIN — ONDANSETRON 4 MG: 2 INJECTION INTRAMUSCULAR; INTRAVENOUS at 20:06

## 2021-08-11 RX ADMIN — HYDROCODONE BITARTRATE AND ACETAMINOPHEN 1 TABLET: 5; 325 TABLET ORAL at 23:16

## 2021-08-11 RX ADMIN — SODIUM CHLORIDE 75 ML/HR: 9 INJECTION, SOLUTION INTRAVENOUS at 23:17

## 2021-08-11 RX ADMIN — ONDANSETRON 4 MG: 2 INJECTION INTRAMUSCULAR; INTRAVENOUS at 23:16

## 2021-08-11 RX ADMIN — SODIUM CHLORIDE 1000 ML: 9 INJECTION, SOLUTION INTRAVENOUS at 20:14

## 2021-08-11 RX ADMIN — SODIUM CHLORIDE 1000 ML: 9 INJECTION, SOLUTION INTRAVENOUS at 18:45

## 2021-08-11 NOTE — ED PROVIDER NOTES
Subjective   60-year-old male presents with a complaint of generalized headache.  Reports that the symptoms have been present now for approximate last week.  He also reports associated fatigue, cough, and mild shortness of breath.  He denies any chest pain or sally pain.  He denies change in bowel function clued no blood in the stool or diarrhea.  He denies change in urination including no dysuria, frequency, urgency.  He reports that his oral intake has been relatively stable.  He has been vaccinated gets COVID-19 and has not been previously diagnosed with COVID-19.  No other acute complaints at this time.  He exhibits normal mentation.          Review of Systems   Constitutional: Positive for activity change, appetite change and fatigue. Negative for chills and fever.   HENT: Negative for congestion, ear pain, postnasal drip, sinus pressure and sore throat.    Eyes: Negative for pain, redness and visual disturbance.   Respiratory: Positive for cough and shortness of breath. Negative for chest tightness.    Cardiovascular: Negative for chest pain, palpitations and leg swelling.   Gastrointestinal: Positive for nausea. Negative for abdominal pain, anal bleeding, blood in stool, diarrhea and vomiting.   Endocrine: Negative for polydipsia and polyuria.   Genitourinary: Negative for difficulty urinating, dysuria, frequency and urgency.   Musculoskeletal: Negative for arthralgias, back pain and neck pain.   Skin: Negative for pallor and rash.   Allergic/Immunologic: Negative for environmental allergies and immunocompromised state.   Neurological: Positive for headaches. Negative for dizziness and weakness.   Hematological: Negative for adenopathy.   Psychiatric/Behavioral: Negative for confusion, self-injury and suicidal ideas. The patient is not nervous/anxious.    All other systems reviewed and are negative.      Past Medical History:   Diagnosis Date   • Hyperlipidemia    • Type 2 diabetes mellitus (CMS/HCC)         No Known Allergies    History reviewed. No pertinent surgical history.    Family History   Problem Relation Age of Onset   • No Known Problems Mother    • Heart attack Father    • No Known Problems Sister    • No Known Problems Brother        Social History     Socioeconomic History   • Marital status:      Spouse name: Not on file   • Number of children: Not on file   • Years of education: Not on file   • Highest education level: Not on file   Tobacco Use   • Smoking status: Never Smoker   • Smokeless tobacco: Never Used   Vaping Use   • Vaping Use: Never used   Substance and Sexual Activity   • Alcohol use: No   • Drug use: No   • Sexual activity: Defer           Objective   Physical Exam  Vitals and nursing note reviewed.   Constitutional:       General: He is not in acute distress.     Appearance: Normal appearance. He is well-developed. He is not toxic-appearing or diaphoretic.   HENT:      Head: Normocephalic and atraumatic.      Right Ear: External ear normal.      Left Ear: External ear normal.      Nose: Nose normal.   Eyes:      General: Lids are normal.      Pupils: Pupils are equal, round, and reactive to light.   Neck:      Trachea: No tracheal deviation.   Cardiovascular:      Rate and Rhythm: Normal rate and regular rhythm.      Pulses: No decreased pulses.      Heart sounds: Normal heart sounds. No murmur heard.   No friction rub. No gallop.    Pulmonary:      Effort: Pulmonary effort is normal. No respiratory distress.      Breath sounds: Normal breath sounds. No decreased breath sounds, wheezing, rhonchi or rales.   Abdominal:      General: Bowel sounds are normal.      Palpations: Abdomen is soft.      Tenderness: There is no abdominal tenderness. There is no guarding or rebound.   Musculoskeletal:         General: No deformity. Normal range of motion.      Cervical back: Normal range of motion and neck supple.   Lymphadenopathy:      Cervical: No cervical adenopathy.   Skin:      General: Skin is warm and dry.      Findings: No rash.   Neurological:      Mental Status: He is alert and oriented to person, place, and time.      Cranial Nerves: No cranial nerve deficit.      Sensory: No sensory deficit.   Psychiatric:         Speech: Speech normal.         Behavior: Behavior normal.         Thought Content: Thought content normal.         Judgment: Judgment normal.         Procedures           ED Course                                           MDM  Number of Diagnoses or Management Options  Acute dehydration: new and requires workup  Acute renal failure, unspecified acute renal failure type (CMS/HCC): new and requires workup  Elevated lipase: new and requires workup  Diagnosis management comments: Patient presents with complaint of generalized headache and feeling fatigued.  Decreased energy over the last week.  He feels like his appetite is been decreased but feels like he has been drinking plenty of fluids.  He also feels like his urine output is been normal.    Lab evaluation shows elevated lipase level and show significantly elevated creatinine level.    The patient has known diabetes and was initiated on semaglutide approximately 2 months ago.  In addition that he already takes Metformin and several other diabetic medications.    I feel the patient has dehydration and associated acute renal failure and elevated lipase secondary to dehydration and diabetic medication use.    I have initiated IV fluids.    Patient's vital signs are stable and he appears nontoxic.    Discussed the patient with the hospitalist, Dr. Choe, who will  admit.       Amount and/or Complexity of Data Reviewed  Clinical lab tests: ordered and reviewed  Tests in the radiology section of CPT®: ordered and reviewed  Decide to obtain previous medical records or to obtain history from someone other than the patient: yes  Review and summarize past medical records: yes  Discuss the patient with other providers:  yes  Independent visualization of images, tracings, or specimens: yes        Final diagnoses:   Acute renal failure, unspecified acute renal failure type (CMS/HCC)   Elevated lipase   Acute dehydration       ED Disposition  ED Disposition     ED Disposition Condition Comment    Decision to Admit  Level of Care: Telemetry [5]   Diagnosis: Acute renal failure, unspecified acute renal failure type (CMS/HCC) [8286253]   Admitting Physician: RUPERTO TARIQ [[id number]]   Attending Physician: RUPERTO TARIQ [[id number]]            No follow-up provider specified.       Medication List      No changes were made to your prescriptions during this visit.          Rosa Garcia MD  08/11/21 1223

## 2021-08-12 ENCOUNTER — APPOINTMENT (OUTPATIENT)
Dept: ULTRASOUND IMAGING | Facility: HOSPITAL | Age: 61
End: 2021-08-12

## 2021-08-12 LAB
ALBUMIN SERPL-MCNC: 3.6 G/DL (ref 3.5–5.2)
ALBUMIN/GLOB SERPL: 1.2 G/DL
ALP SERPL-CCNC: 79 U/L (ref 39–117)
ALT SERPL W P-5'-P-CCNC: 6 U/L (ref 1–41)
ANION GAP SERPL CALCULATED.3IONS-SCNC: 13 MMOL/L (ref 5–15)
AST SERPL-CCNC: 11 U/L (ref 1–40)
BASOPHILS # BLD AUTO: 0.05 10*3/MM3 (ref 0–0.2)
BASOPHILS NFR BLD AUTO: 0.7 % (ref 0–1.5)
BILIRUB SERPL-MCNC: 0.3 MG/DL (ref 0–1.2)
BUN SERPL-MCNC: 61 MG/DL (ref 8–23)
BUN/CREAT SERPL: 9.8 (ref 7–25)
CALCIUM SPEC-SCNC: 8.8 MG/DL (ref 8.6–10.5)
CHLORIDE SERPL-SCNC: 108 MMOL/L (ref 98–107)
CO2 SERPL-SCNC: 17 MMOL/L (ref 22–29)
CREAT SERPL-MCNC: 6.23 MG/DL (ref 0.76–1.27)
CREAT UR-MCNC: 46.3 MG/DL
CREAT UR-MCNC: 60.1 MG/DL
DEPRECATED RDW RBC AUTO: 40.4 FL (ref 37–54)
EOSINOPHIL # BLD AUTO: 0.1 10*3/MM3 (ref 0–0.4)
EOSINOPHIL NFR BLD AUTO: 1.4 % (ref 0.3–6.2)
EOSINOPHIL SPEC QL MICRO: 0 % EOS/100 CELLS (ref 0–0)
ERYTHROCYTE [DISTWIDTH] IN BLOOD BY AUTOMATED COUNT: 12.2 % (ref 12.3–15.4)
GFR SERPL CREATININE-BSD FRML MDRD: 9 ML/MIN/1.73
GFR SERPL CREATININE-BSD FRML MDRD: ABNORMAL ML/MIN/{1.73_M2}
GLOBULIN UR ELPH-MCNC: 3.1 GM/DL
GLUCOSE BLDC GLUCOMTR-MCNC: 136 MG/DL (ref 70–130)
GLUCOSE BLDC GLUCOMTR-MCNC: 168 MG/DL (ref 70–130)
GLUCOSE BLDC GLUCOMTR-MCNC: 216 MG/DL (ref 70–130)
GLUCOSE BLDC GLUCOMTR-MCNC: 217 MG/DL (ref 70–130)
GLUCOSE SERPL-MCNC: 137 MG/DL (ref 65–99)
HBA1C MFR BLD: 8 % (ref 4.8–5.6)
HCT VFR BLD AUTO: 39.6 % (ref 37.5–51)
HGB BLD-MCNC: 13.2 G/DL (ref 13–17.7)
IMM GRANULOCYTES # BLD AUTO: 0.04 10*3/MM3 (ref 0–0.05)
IMM GRANULOCYTES NFR BLD AUTO: 0.5 % (ref 0–0.5)
LYMPHOCYTES # BLD AUTO: 1.34 10*3/MM3 (ref 0.7–3.1)
LYMPHOCYTES NFR BLD AUTO: 18.4 % (ref 19.6–45.3)
MCH RBC QN AUTO: 30.3 PG (ref 26.6–33)
MCHC RBC AUTO-ENTMCNC: 33.3 G/DL (ref 31.5–35.7)
MCV RBC AUTO: 91 FL (ref 79–97)
MONOCYTES # BLD AUTO: 0.8 10*3/MM3 (ref 0.1–0.9)
MONOCYTES NFR BLD AUTO: 11 % (ref 5–12)
NEUTROPHILS NFR BLD AUTO: 4.95 10*3/MM3 (ref 1.7–7)
NEUTROPHILS NFR BLD AUTO: 68 % (ref 42.7–76)
NRBC BLD AUTO-RTO: 0 /100 WBC (ref 0–0.2)
PLATELET # BLD AUTO: 183 10*3/MM3 (ref 140–450)
PMV BLD AUTO: 10.3 FL (ref 6–12)
POTASSIUM SERPL-SCNC: 4.2 MMOL/L (ref 3.5–5.2)
PROT SERPL-MCNC: 6.7 G/DL (ref 6–8.5)
PROT UR-MCNC: 12.6 MG/DL
QT INTERVAL: 356 MS
QT INTERVAL: 366 MS
QTC INTERVAL: 435 MS
QTC INTERVAL: 445 MS
RBC # BLD AUTO: 4.35 10*6/MM3 (ref 4.14–5.8)
SODIUM SERPL-SCNC: 138 MMOL/L (ref 136–145)
SODIUM UR-SCNC: 67 MMOL/L
WBC # BLD AUTO: 7.28 10*3/MM3 (ref 3.4–10.8)

## 2021-08-12 PROCEDURE — 76775 US EXAM ABDO BACK WALL LIM: CPT

## 2021-08-12 PROCEDURE — 82962 GLUCOSE BLOOD TEST: CPT

## 2021-08-12 PROCEDURE — 83036 HEMOGLOBIN GLYCOSYLATED A1C: CPT | Performed by: INTERNAL MEDICINE

## 2021-08-12 PROCEDURE — 63710000001 INSULIN LISPRO (HUMAN) PER 5 UNITS: Performed by: NURSE PRACTITIONER

## 2021-08-12 PROCEDURE — 87205 SMEAR GRAM STAIN: CPT | Performed by: INTERNAL MEDICINE

## 2021-08-12 PROCEDURE — 84156 ASSAY OF PROTEIN URINE: CPT | Performed by: INTERNAL MEDICINE

## 2021-08-12 PROCEDURE — 82570 ASSAY OF URINE CREATININE: CPT | Performed by: INTERNAL MEDICINE

## 2021-08-12 PROCEDURE — 99232 SBSQ HOSP IP/OBS MODERATE 35: CPT | Performed by: INTERNAL MEDICINE

## 2021-08-12 PROCEDURE — 80053 COMPREHEN METABOLIC PANEL: CPT | Performed by: INTERNAL MEDICINE

## 2021-08-12 PROCEDURE — 85025 COMPLETE CBC W/AUTO DIFF WBC: CPT | Performed by: INTERNAL MEDICINE

## 2021-08-12 RX ORDER — SODIUM CHLORIDE, SODIUM LACTATE, POTASSIUM CHLORIDE, CALCIUM CHLORIDE 600; 310; 30; 20 MG/100ML; MG/100ML; MG/100ML; MG/100ML
100 INJECTION, SOLUTION INTRAVENOUS CONTINUOUS
Status: DISCONTINUED | OUTPATIENT
Start: 2021-08-12 | End: 2021-08-12

## 2021-08-12 RX ORDER — SODIUM CHLORIDE 9 MG/ML
125 INJECTION, SOLUTION INTRAVENOUS CONTINUOUS
Status: DISCONTINUED | OUTPATIENT
Start: 2021-08-12 | End: 2021-08-14 | Stop reason: HOSPADM

## 2021-08-12 RX ADMIN — INSULIN LISPRO 3 UNITS: 100 INJECTION, SOLUTION INTRAVENOUS; SUBCUTANEOUS at 17:02

## 2021-08-12 RX ADMIN — INSULIN LISPRO 2 UNITS: 100 INJECTION, SOLUTION INTRAVENOUS; SUBCUTANEOUS at 11:20

## 2021-08-12 RX ADMIN — SODIUM CHLORIDE 125 ML/HR: 9 INJECTION, SOLUTION INTRAVENOUS at 14:22

## 2021-08-12 RX ADMIN — SODIUM CHLORIDE, POTASSIUM CHLORIDE, SODIUM LACTATE AND CALCIUM CHLORIDE 100 ML/HR: 600; 310; 30; 20 INJECTION, SOLUTION INTRAVENOUS at 09:10

## 2021-08-12 NOTE — PLAN OF CARE
Problem: Adult Inpatient Plan of Care  Goal: Plan of Care Review  Outcome: Ongoing, Progressing  Flowsheets (Taken 8/12/2021 1402)  Outcome Summary: VSS, room air. Nephrology consult. Diabetes eduaction consult placed, does not seem to understand insulin therapy/glucose management. IV fluids infusing, continue to monitor.     Problem: Adult Inpatient Plan of Care  Goal: Patient-Specific Goal (Individualized)  Outcome: Ongoing, Progressing  Flowsheets (Taken 8/12/2021 1402)  Patient-Specific Goals (Include Timeframe): Monitor pain q2h     Problem: Adult Inpatient Plan of Care  Goal: Absence of Hospital-Acquired Illness or Injury  Outcome: Ongoing, Progressing     Problem: Adult Inpatient Plan of Care  Goal: Absence of Hospital-Acquired Illness or Injury  Intervention: Identify and Manage Fall Risk  Recent Flowsheet Documentation  Taken 8/12/2021 1200 by Mary Badillo RN  Safety Promotion/Fall Prevention: activity supervised  Taken 8/12/2021 1000 by Mary Badillo RN  Safety Promotion/Fall Prevention: activity supervised  Taken 8/12/2021 0800 by Mary Badillo RN  Safety Promotion/Fall Prevention: activity supervised     Problem: Adult Inpatient Plan of Care  Goal: Absence of Hospital-Acquired Illness or Injury  Intervention: Prevent Skin Injury  Recent Flowsheet Documentation  Taken 8/12/2021 1200 by Mary Badillo RN  Body Position: position changed independently  Taken 8/12/2021 1000 by Mary Badillo RN  Body Position: position changed independently  Taken 8/12/2021 0800 by Mary Badillo RN  Body Position: position changed independently     Problem: Adult Inpatient Plan of Care  Goal: Absence of Hospital-Acquired Illness or Injury  Intervention: Prevent and Manage VTE (venous thromboembolism) Risk  Recent Flowsheet Documentation  Taken 8/12/2021 0800 by Mary Badillo RN  VTE Prevention/Management:   bilateral   dorsiflexion/plantar flexion performed     Problem: Adult Inpatient  Plan of Care  Goal: Optimal Comfort and Wellbeing  Outcome: Ongoing, Progressing     Problem: Adult Inpatient Plan of Care  Goal: Optimal Comfort and Wellbeing  Intervention: Provide Person-Centered Care  Recent Flowsheet Documentation  Taken 8/12/2021 0800 by Mary Badillo RN  Trust Relationship/Rapport:   care explained   choices provided     Problem: Adjustment to Illness (Chronic Kidney Disease)  Goal: Optimal Coping with Chronic Illness  Outcome: Ongoing, Progressing     Problem: Electrolyte Imbalance (Chronic Kidney Disease)  Goal: Electrolyte Balance  Outcome: Ongoing, Progressing     Problem: Functional Decline (Chronic Kidney Disease)  Goal: Optimal Functional Ability  Outcome: Ongoing, Progressing     Problem: Functional Decline (Chronic Kidney Disease)  Goal: Optimal Functional Ability  Intervention: Optimize Functional Ability  Recent Flowsheet Documentation  Taken 8/12/2021 1200 by Mary Badillo RN  Activity Management: activity adjusted per tolerance  Taken 8/12/2021 1000 by Mary Badillo RN  Activity Management: activity adjusted per tolerance  Taken 8/12/2021 0800 by Mary Badillo RN  Activity Management: activity adjusted per tolerance     Problem: Hematologic Alteration (Chronic Kidney Disease)  Goal: Absence of Anemia Signs and Symptoms  Outcome: Ongoing, Progressing     Problem: Oral Intake Inadequate (Chronic Kidney Disease)  Goal: Optimal Oral Intake  Outcome: Ongoing, Progressing     Problem: Renal Function Impairment (Chronic Kidney Disease)  Goal: Laboratory Values and Blood Pressure Within Desired Range  Outcome: Ongoing, Progressing     Problem: Renal Function Impairment (Chronic Kidney Disease)  Goal: Laboratory Values and Blood Pressure Within Desired Range  Intervention: Monitor and Support Renal Function  Recent Flowsheet Documentation  Taken 8/12/2021 1200 by Mary Badillo RN  Medication Review/Management: medications reviewed  Taken 8/12/2021 1000 by Aby  Mary, STEPHANIE  Medication Review/Management: medications reviewed  Taken 8/12/2021 0800 by Mary Badillo RN  Medication Review/Management: medications reviewed   Goal Outcome Evaluation:              Outcome Summary: VSS, room air. Nephrology consult. Diabetes eduaction consult placed, does not seem to understand insulin therapy/glucose management. IV fluids infusing, continue to monitor.

## 2021-08-12 NOTE — CASE MANAGEMENT/SOCIAL WORK
Discharge Planning Assessment  [facility]     Patient Name: Ceasar Hawk  MRN: [id number]  Today's Date: 8/12/2021    Admit Date: 8/11/2021    Discharge Needs Assessment     Row Name 08/12/21 1516       Living Environment    Lives With  spouse    Name(s) of Who Lives With Patient  wife Darby @ [phone]    Current Living Arrangements  home/apartment/condo    Family Caregiver if Needed  spouse    Quality of Family Relationships  helpful;supportive    Able to Return to Prior Arrangements  yes    Living Arrangement Comments  Plan home       Transition Planning    Patient/Family Anticipates Transition to  home with family    Transportation Anticipated  family or friend will provide       Discharge Needs Assessment    Equipment Currently Used at Home  none    Equipment Needed After Discharge  none    Discharge Coordination/Progress  Home at d/c        Discharge Plan     Row Name 08/12/21 1516       Plan    Plan  Home/Wife    Patient/Family in Agreement with Plan  yes    Plan Comments  I talked with patient's wife Darby this am. Patient lives with her, works full time, Independent PTA and no d/c needs noted at this time. Plan is home at d/c. Tita @ [phone]    Final Discharge Disposition Code  01 - home or self-care        Continued Care and Services - Admitted Since 8/11/2021    Coordination has not been started for this encounter.     Selected Continued Care - Episodes Includes selections from active Coordinated Care Management episodes    Rising Risk Care Management Episode start date: 8/12/2021   There are no active outsourced providers for this episode.                 Demographic Summary     Row Name 08/12/21 1515       General Information    Admission Type  inpatient    Referral Source  admission list    Reason for Consult  discharge planning    Preferred Language  English     Used During This Interaction  no       Contact Information    Permission Granted to Share Info With      Contact  Information Obtained for          Functional Status     Row Name 08/12/21 1515       Functional Status    Usual Activity Tolerance  good    Current Activity Tolerance  moderate       Functional Status, IADL    Medications  independent    Meal Preparation  independent    Housekeeping  independent    Laundry  independent    Shopping  independent    IADL Comments  Has coverage for meds       Employment/    Employment Status  employed full-time        Psychosocial    No documentation.       Abuse/Neglect    No documentation.       Legal    No documentation.       Substance Abuse    No documentation.       Patient Forms    No documentation.           Jeannine Dangelo RN

## 2021-08-12 NOTE — PLAN OF CARE
Goal Outcome Evaluation:  Plan of Care Reviewed With: patient        Progress: no change  Outcome Summary: Admitted with AMBROCIO, dehydration and elevated Lipase. Receiving IVFs. Medicated for pain and nausea. Lab pending this am. Will continue to monitor

## 2021-08-12 NOTE — CONSULTS
NAL Consult Note    Ceasar Hawk  1960  [id number]    Date of Admit:  8/11/2021    Date of Consult: 8/12/2021        Requesting Provider: Codi Choe MD  Evaluating Physician: Ricardo Szymanski MD        Reason for Consultation:  AMBROCIO  History of present illness:    Patient is a 60 y.o.  Yr old male WITH H/O TYPE 2 DM ( RECENTLY STARTED ON GLUCAGON LIKE PEPTIDE 1 RECEPTOR AGONISTS WITH SEMAGULTIDE ) AND HYPERLIPIDEMIA  ADMITTED WITH ABD PAIN WITH N/V X 7 DAYS AND WE ARE ASKED TO FOR AMBROCIO (  BUN/CREAT 67/8.1-63/6.69-61/6.23 OVER THE PAST DAY WITH IVF'S ). HAS BEEN TAKING 2 IBUPROFEN PILLS A DAY FOR THE PAST WEEK.NO KNOWN H/O OF KIDNEY DZ. WORK-UP SO FAR : CT SCAN WITH NO HYDRO. CPK 20. COVID 19 NEG. U NA 67.    Past Medical History:   Diagnosis Date   • Hyperlipidemia    • Type 2 diabetes mellitus (CMS/HCC)        History reviewed. No pertinent surgical history.    Social History     Socioeconomic History   • Marital status:      Spouse name: Not on file   • Number of children: Not on file   • Years of education: Not on file   • Highest education level: Not on file   Tobacco Use   • Smoking status: Never Smoker   • Smokeless tobacco: Never Used   Vaping Use   • Vaping Use: Never used   Substance and Sexual Activity   • Alcohol use: No   • Drug use: No   • Sexual activity: Defer       family history includes Heart attack in his father; No Known Problems in his brother, mother, and sister. PATIENT'S SON WITH RENAL TRANSPLANT AND ESRD FROM WHAT SOUNDS LIKE GN.     No Known Allergies    Medication:    Current Facility-Administered Medications:   •  acetaminophen (TYLENOL) tablet 650 mg, 650 mg, Oral, Q6H PRN, Codi Choe MD  •  dextrose (D50W) 25 g/ 50mL Intravenous Solution 25 g, 25 g, Intravenous, Q15 Min PRN, Nessa, Amalia, APRN  •  dextrose (GLUTOSE) oral gel 15 g, 15 g, Oral, Q15 Min PRN, Nessa, Amalia, APRN  •  glucagon (human recombinant) (GLUCAGEN DIAGNOSTIC) injection 1 mg, 1  mg, Subcutaneous, Q15 Min PRN, Nessa, Amalia, APRN  •  HYDROcodone-acetaminophen (NORCO) 5-325 MG per tablet 1 tablet, 1 tablet, Oral, Q6H PRN, Codi Choe MD, 1 tablet at 08/11/21 2316  •  insulin lispro (humaLOG) injection 0-7 Units, 0-7 Units, Subcutaneous, TID AC, Nessa, Amalia, APRN, 2 Units at 08/12/21 1120  •  ondansetron (ZOFRAN) injection 4 mg, 4 mg, Intravenous, Q6H PRN, Nessa, Amalia, APRN, 4 mg at 08/11/21 2316  •  ondansetron (ZOFRAN) tablet 4 mg, 4 mg, Oral, Q6H PRN **OR** [DISCONTINUED] ondansetron (ZOFRAN) injection 4 mg, 4 mg, Intravenous, Q6H PRN, Nessa, Amalia, APRN  •  sodium chloride 0.9 % flush 10 mL, 10 mL, Intravenous, PRN, Rosa Garcia MD  •  sodium chloride 0.9 % infusion, 125 mL/hr, Intravenous, Continuous, Nessa, Ricardo Albarran MD, Last Rate: 125 mL/hr at 08/12/21 1422, 125 mL/hr at 08/12/21 1422    Medications Prior to Admission   Medication Sig Dispense Refill Last Dose   • metFORMIN ER (GLUCOPHAGE-XR) 500 MG 24 hr tablet Take 2 tablets by mouth 2 (two) times a day. 360 tablet 1 Past Week at Unknown time   • pioglitazone (ACTOS) 30 MG tablet Take 1 tablet by mouth Daily. 90 tablet 1 Past Week at Unknown time   • rosuvastatin (CRESTOR) 20 MG tablet Take 1 tablet by mouth Daily. 90 tablet 3 Past Week at Unknown time   • Semaglutide (Rybelsus) 7 MG tablet Take 1 tablet (7 mg) by mouth Every Morning Before Breakfast. 30 tablet 5 Past Week at Unknown time   • tadalafil (Cialis) 10 MG tablet Take 1-2 tablets 1 hour prior to activity as needed for erectile dysfunction 20 tablet 0 Past Month at Unknown time   • cyclobenzaprine (FLEXERIL) 5 MG tablet Take 1 tablet by mouth 3 (Three) Times a Day As Needed for Muscle Spasms. 30 tablet 0 More than a month at Unknown time       Review of Systems:    Constitutional-- No Fever, chills or sweats. No significant change in weight + FATIGUE/WEAKNESS.   Eye-- no diplopia, no conjunctivitis  ENT-- No new hearing or throat  "complaints.  No epistaxis or oral sores. No odynophagia or dysphagia.  + headache. NO photophobia or neck stiffness.  CV-- No chest pain, palpitations,  or edema  Resp--  MILD SOB/cough. NO Hemoptysis  GI- + nausea/vomiting. NO diarrhea.  No hematochezia, melena, or hematemesis. + ABD PAIN AND CONSTIPATION.  -- No dysuria, hematuria, or flank pain. No lower tract obstructive symptoms  Skin-- No rash/ITCHING  Lymph- no painful or swollen lymph nodes in neck/axilla or groin.   Heme- No active bruising or bleeding; no Hx of DVT or PE.  MS-- no swelling or pain in the joints  Neuro-- No acute focal weakness or numbness in the arms or legs.  No seizures. + DIZZINESS.  Psych--No anxiety or depression  Endo--No cold or heat intolerance.  No polyuria, polydipsia, or polyphagia    Full review of systems reviewed and negative otherwise for acute complaints    Physical Exam:   Vital Signs   Blood pressure 132/81, pulse 82, temperature 98.1 °F (36.7 °C), temperature source Oral, resp. rate 16, height 182.9 cm (72.01\"), weight 88.2 kg (194 lb 6.4 oz), SpO2 94 %.     GENERAL: Awake and alert, in no acute distress.   HEENT: Normocephalic, atraumatic.  PER.  No conjunctivitis. No icterus. Oropharynx clear without evidence of thrush or exudate. No evidence of peridontal disease.    NECK: Supple without nuchal rigidity. No mass.  LYMPH: No painful cervical, axillary or inguinal lymphadenopathy.  HEART: RRR; No murmur, rubs, gallops. No bruits in neck, abdomen, or groins, no edema  LUNGS: Normal respiratory effort. Nonlabored. No dullness.  No crepitus.  Clear to auscultation bilaterally without wheezing, rales, rhonchi.  ABDOMEN: Soft, nontender, nondistended. Positive bowel sounds. No rebound or guarding. NO mass or HSM.  JOINTS:  Full range of motion, no redness or tenderness.  EXT:  No cyanosis/clubbing.  :  BLADDER NOT DISTENDED. NO CVAT.  SKIN: Warm and dry without rash  NEURO: Oriented to PPT. No focal neurological " deficits. Strength equal bilateral  PSYCHIATRIC: Normal insight and judgement. Cooperative with PE    Laboratory Data  Results from last 7 days   Lab Units 08/12/21  0646 08/11/21  1318   HEMOGLOBIN g/dL 13.2 15.2   HEMATOCRIT % 39.6 45.3     Results from last 7 days   Lab Units 08/12/21  0646 08/11/21  2243 08/11/21  1318   SODIUM mmol/L 138 138 135*   POTASSIUM mmol/L 4.2 4.4 4.9   CHLORIDE mmol/L 108* 103 97*   CO2 mmol/L 17.0* 18.0* 20.0*   BUN mg/dL 61* 63* 67*   CREATININE mg/dL 6.23* 6.69* 8.10*   CALCIUM mg/dL 8.8 9.2 10.3   ALBUMIN g/dL 3.60  --  4.60     Results from last 7 days   Lab Units 08/12/21  0646   GLUCOSE mg/dL 137*     Results from last 7 days   Lab Units 08/11/21  1747   COLOR UA  Yellow   CLARITY UA  Clear   PH, URINE  <=5.0   SPECIFIC GRAVITY, URINE  1.015   GLUCOSE UA  100 mg/dL (Trace)*   KETONES UA  Trace*   BILIRUBIN UA  Negative   PROTEIN UA  100 mg/dL (2+)*   BLOOD UA  Trace*   LEUKOCYTES UA  Negative   NITRITE UA  Negative     Results from last 7 days   Lab Units 08/12/21  0646   ALK PHOS U/L 79   BILIRUBIN mg/dL 0.3   ALT (SGPT) U/L 6   AST (SGOT) U/L 11     Estimated Creatinine Clearance: 15.3 mL/min (A) (by C-G formula based on SCr of 6.23 mg/dL (H)).    Radiology:      Impression: AMBROCIO LIKELY VOLUME DEPLETION FROM GI LOSSES AND SEMAGLUTIDE. GFR IMPROVING WITH IVF'S. PROTEINURIA. DM. HLP.      PLAN: Thank you for asking us to see Ceasar Hawk, I recommend the following: CHANGE IVF TO NS. CHECK RENAL U/S, URINE STUDIES WILL FOLLOW. HOPEFULLY RENAL FXN WILL CONTINUE TO IMPROVE AND PATIENT CAN AVOID RRT AND DIALYSIS ACCESS PLACEMENT.       Ricardo Szymanski MD  8/12/2021  14:28 EDT

## 2021-08-12 NOTE — PROGRESS NOTES
[facility] Medicine Services  PROGRESS NOTE    Patient Name: Ceasar Hawk  : 1960  MRN: [id number]    Date of Admission: 2021  Primary Care Physician: Carin Dorman PA    Subjective   Subjective     CC:  Follow-up renal failure    HPI:  Mild headache this morning, 2-3/10 which is improved compared to arrival.  His cough is better.  He thinks the fluids are making the biggest difference for him.  He states that he is still making a normal amount of urine, and that he has not noticed any changes in his urine quality/quantity/frequency over the last weeks    ROS:  Gen- No fevers, chills  CV- No chest pain, palpitations  Resp- No cough, dyspnea  GI- No N/V/D, abd pain    Objective   Objective     Vital Signs:   Temp:  [98.2 °F (36.8 °C)-98.3 °F (36.8 °C)] 98.3 °F (36.8 °C)  Heart Rate:  [73-93] 73  Resp:  [16-18] 16  BP: (121-159)/(70-99) 121/78     Physical Exam:  Constitutional: No acute distress, awake, alert, lying in bed  HENT: NCAT, mucous membranes moist  Respiratory: Clear to auscultation bilaterally, respiratory effort normal   Cardiovascular: RRR, no murmurs, rubs, or gallops, palpable radial pulses  Gastrointestinal: Positive bowel sounds, soft, nontender, nondistended  Musculoskeletal: No bilateral ankle edema  Psychiatric: Appropriate affect, cooperative  Neurologic: Speech clear, moving all extremity symmetrically    Results Reviewed:  LAB RESULTS:      Lab 21  1318   WBC 10.89*   HEMOGLOBIN 15.2   HEMATOCRIT 45.3   PLATELETS 271   NEUTROS ABS 7.24*   IMMATURE GRANS (ABS) 0.05   LYMPHS ABS 1.64   MONOS ABS 0.74   EOS ABS 1.15*   MCV 91.0         Lab 21  2243 21  1318   SODIUM 138 135*   POTASSIUM 4.4 4.9   CHLORIDE 103 97*   CO2 18.0* 20.0*   ANION GAP 17.0* 18.0*   BUN 63* 67*   CREATININE 6.69* 8.10*   GLUCOSE 163* 170*   CALCIUM 9.2 10.3         Lab 21  1318   TOTAL PROTEIN 8.3   ALBUMIN 4.60   GLOBULIN 3.7   ALT (SGPT) 7   AST (SGOT) 17    BILIRUBIN 0.3   ALK PHOS 103   LIPASE 223*         Lab 08/11/21  1651 08/11/21  1318   PROBNP  --  309.6   TROPONIN T <0.010 0.010                 Brief Urine Lab Results  (Last result in the past 365 days)      Color   Clarity   Blood   Leuk Est   Nitrite   Protein   CREAT   Urine HCG        08/11/21 1747 Yellow Clear Trace Negative Negative 100 mg/dL (2+)               Microbiology Results Abnormal     Procedure Component Value - Date/Time    COVID PRE-OP / PRE-PROCEDURE SCREENING ORDER (NO ISOLATION) - Swab, Nasopharynx [[id number]]  (Normal) Collected: 08/11/21 2006    Lab Status: Final result Specimen: Swab from Nasopharynx Updated: 08/11/21 2037    Narrative:      The following orders were created for panel order COVID PRE-OP / PRE-PROCEDURE SCREENING ORDER (NO ISOLATION) - Swab, Nasopharynx.  Procedure                               Abnormality         Status                     ---------                               -----------         ------                     COVID-19 and FLU A/B PCR...[[id number]]  Normal              Final result                 Please view results for these tests on the individual orders.    COVID-19 and FLU A/B PCR - Swab, Nasopharynx [[id number]]  (Normal) Collected: 08/11/21 2006    Lab Status: Final result Specimen: Swab from Nasopharynx Updated: 08/11/21 2037     COVID19 Not Detected     Influenza A PCR Not Detected     Influenza B PCR Not Detected    Narrative:      Fact sheet for providers: https://www.fda.gov/media/160758/download    Fact sheet for patients: https://www.fda.gov/media/947735/download    Test performed by PCR.          CT Abdomen Pelvis Without Contrast    Result Date: 8/11/2021  EXAMINATION: CT ABDOMEN PELVIS WO CONTRAST-  INDICATION: acute renal failure  TECHNIQUE: Axial noncontrast CT of the abdomen and pelvis with multiplanar reconstruction  The radiation dose reduction device was turned on for each scan per the ALARA (As Low as Reasonably Achievable)  protocol.  COMPARISON: NONE  FINDINGS: The lung bases are grossly clear. The body wall soft tissues are unremarkable. There is no evidence of acute fracture or aggressive osseous lesion. The liver, spleen, pancreas and bilateral adrenal glands demonstrate no evidence of suspicious focal lesion. Noncontrast evaluation of the kidneys demonstrates no specific evidence of hydronephrosis, obstruction or nephropathy. Small and large bowel loops are nondilated. The appendix is normal. There is no free fluid or pneumoperitoneum. Unremarkable gallbladder. The pelvic viscera are unremarkable. Normal caliber atherosclerotic abdominal aorta. No bulky retroperitoneal adenopathy.      Impression: No acute findings in the abdomen and pelvis. Specifically no evidence of renal calculi, hydronephrosis or definite nephropathy.   This report was finalized on 8/11/2021 6:18 PM by Ricardo Pitt.      CT Head Without Contrast    Result Date: 8/11/2021  EXAMINATION: CT HEAD WO CONTRAST-  INDICATION: headache  TECHNIQUE: Axial noncontrast CT of the head with multiplanar reconstruction  The radiation dose reduction device was turned on for each scan per the ALARA (As Low as Reasonably Achievable) protocol.  COMPARISON: NONE  FINDINGS: Gray-white differentiation is maintained and there is no evidence of intracranial hemorrhage, mass or mass effect. The ventricles are normal in size and configuration. The orbits are unremarkable and the paranasal sinuses are grossly clear. The calvarium is intact.      Impression: No acute intracranial abnormality.   This report was finalized on 8/11/2021 6:21 PM by Ricardo Pitt.      XR Chest PA & Lateral    Result Date: 8/11/2021  EXAMINATION: XR CHEST PA AND LATERAL- 08/11/2021  INDICATION: Chest Pain triage protocol  COMPARISON: NONE  FINDINGS: PA and lateral views of the chest reveal cardiac and mediastinal silhouettes within normal limits. The lung fields are grossly clear. No focal parenchymal  opacification present. No pleural effusion or pneumothorax.  The bony structures are unremarkable.      Impression: No acute cardiopulmonary disease.  D:  08/11/2021 E:  08/11/2021             I have reviewed the medications:  Scheduled Meds:insulin lispro, 0-7 Units, Subcutaneous, TID AC      Continuous Infusions:sodium chloride, 75 mL/hr, Last Rate: 75 mL/hr (08/11/21 3257)      PRN Meds:.•  acetaminophen  •  dextrose  •  dextrose  •  glucagon (human recombinant)  •  HYDROcodone-acetaminophen  •  ondansetron  •  ondansetron **OR** [DISCONTINUED] ondansetron  •  sodium chloride    Assessment/Plan   Assessment & Plan     Active Hospital Problems    Diagnosis  POA   • **Acute renal failure (CMS/HCC) [N17.9]  Yes   • Elevated lipase [R74.8]  Yes   • Leukocytosis [D72.829]  Yes   • Mixed hyperlipidemia [E78.2]  Yes   • Type 2 diabetes mellitus with hyperglycemia, without long-term current use of insulin (CMS/HCC) [E11.65]  Yes      Resolved Hospital Problems   No resolved problems to display.        Brief Hospital Course to date:  Ceasar Hawk is a 60 y.o. male with HLD, DM 2 with recent increase in his semaglutide initially presenting with fatigue, mild cough, N/V found to have a creatinine of 8.1 impaired to normal 5 months ago    The following problems are new to me today    Assessment/plan    Acute renal failure  -Multiple possible etiologies  -Possible related to increase semaglutide  -Holding nephrotoxic meds, strict intake and output  -UA mild, no obstructive uropathy evident on CT of the A/P  -Continue IVF  -Urine studies pending  -Nephrology consult pending    DM type 2, A1c 8.0%, without long-term use of insulin  -Continue Accu-Cheks with SSI    Hyperlipidemia  -Statin held on admission, unclear cause, restarted discharge    DVT prophylaxis:  Mechanical DVT prophylaxis orders are present.       AM-PAC 6 Clicks Score (PT): 24 (08/11/21 4699)    Disposition: I expect the patient to be discharged in 1-3 days  presuming continued improvement in renal function and stable clinical course.    CODE STATUS:   Code Status and Medical Interventions:   Ordered at: 08/11/21 2112     Level Of Support Discussed With:    Patient     Code Status:    CPR     Medical Interventions (Level of Support Prior to Arrest):    Full       Ramiro Willoughby, DO  08/12/21

## 2021-08-12 NOTE — H&P
[facility] Medicine Services  HISTORY AND PHYSICAL    Patient Name: Ceasar Hawk  : 1960  MRN: [id number]  Primary Care Physician: Carin Dorman PA  Date of admission: 2021    Subjective   Subjective     Chief Complaint:  Headache    HPI:  Ceasar Hawk is a 60 y.o. male past medical history significant for hyperlipidemia and diabetes mellitus type 2 presents the ED accompanied by wife due to generalized headache.  Patient reports his headache began approximately last Thursday.  Since then he has had associated fatigue, cough, nausea,vomiting and mild shortness of breath. He denies any fever, chills, chest pain, myalgias, lower extremity edema, dyrusia or hematuria.  He currently follows with Dr. Lau with Endocrinology for his Type 2 Diabetes.  He was seen by Endocrinology on 21 in which he had his Rybelsus increased to 7 mg daily.  Patient reports he has been on 3.5 mg daily for about three months.  Due to his hgb A1c being up to 8.4 his Rybelsus was increased.  Patient reports he has been taking his 3.5 mg twice daily until he was out, then he was to take the 7 mg tablets.     Workup in the ED tonight reveals creatinine of 8.10, lipase of 223 with unremarkable CT of abdomen and pelvis.  Patient will be admitted to [facility] under the care of the Hospitalist for further evaluation and treatment.       COVID Details:    Symptoms:    [] NONE [] Fever []  Cough [] Shortness of breath [] Change in taste/smell      The patient has a COVID HM Topic on their chart, and they are fully vaccinated.      Review of Systems   Constitutional: Positive for activity change, appetite change and fatigue. Negative for chills, diaphoresis, fever and unexpected weight change.   Eyes: Negative for photophobia and visual disturbance.   Respiratory: Positive for cough and shortness of breath.    Cardiovascular: Negative for chest pain, palpitations and leg swelling.   Gastrointestinal: Positive  for abdominal pain, nausea and vomiting. Negative for abdominal distention, blood in stool, constipation and diarrhea.   Genitourinary: Negative.    Musculoskeletal: Negative for neck pain and neck stiffness.   Skin: Negative.    Neurological: Positive for dizziness, weakness, light-headedness and headaches. Negative for speech difficulty and numbness.   Psychiatric/Behavioral: Negative.         All other systems reviewed and are negative.     Personal History     Past Medical History:   Diagnosis Date   • Hyperlipidemia    • Type 2 diabetes mellitus (CMS/HCC)        History reviewed. No pertinent surgical history.    Family History: family history includes Heart attack in his father; No Known Problems in his brother, mother, and sister. Otherwise pertinent FHx was reviewed and unremarkable.     Social History:  reports that he has never smoked. He has never used smokeless tobacco. He reports that he does not drink alcohol and does not use drugs.  Social History     Social History Narrative   • Not on file       Medications:  Semaglutide, cyclobenzaprine, glipizide, metFORMIN ER, pioglitazone, rosuvastatin, and tadalafil    No Known Allergies    Objective   Objective     Vital Signs:   Temp:  [98.2 °F (36.8 °C)] 98.2 °F (36.8 °C)  Heart Rate:  [82-88] 82  Resp:  [16-18] 18  BP: (132-159)/(78-99) 143/78    Physical Exam  Vitals and nursing note reviewed.   Constitutional:       General: He is not in acute distress.     Appearance: Normal appearance. He is normal weight. He is not ill-appearing, toxic-appearing or diaphoretic.   HENT:      Head: Normocephalic and atraumatic.      Nose: Nose normal.      Mouth/Throat:      Mouth: Mucous membranes are dry.      Pharynx: Oropharynx is clear.   Eyes:      General: No scleral icterus.        Right eye: No discharge.         Left eye: No discharge.      Extraocular Movements: Extraocular movements intact.      Conjunctiva/sclera: Conjunctivae normal.      Pupils: Pupils are  equal, round, and reactive to light.   Cardiovascular:      Rate and Rhythm: Normal rate and regular rhythm.      Pulses: Normal pulses.      Heart sounds: Normal heart sounds.   Pulmonary:      Effort: Pulmonary effort is normal.      Breath sounds: Normal breath sounds.   Abdominal:      General: Bowel sounds are normal. There is no distension.      Palpations: Abdomen is soft. There is no mass.      Tenderness: There is abdominal tenderness. There is no right CVA tenderness, left CVA tenderness, guarding or rebound.      Hernia: No hernia is present.      Comments: Generalized tenderness throughout, specifically epigastric and RUQ    Musculoskeletal:         General: No swelling, tenderness, deformity or signs of injury. Normal range of motion.      Cervical back: Normal range of motion and neck supple.      Right lower leg: No edema.      Left lower leg: No edema.   Skin:     General: Skin is warm and dry.   Neurological:      General: No focal deficit present.      Mental Status: He is alert and oriented to person, place, and time. Mental status is at baseline.   Psychiatric:         Mood and Affect: Mood normal.         Behavior: Behavior normal.         Thought Content: Thought content normal.         Judgment: Judgment normal.            Results Reviewed:  I have personally reviewed most recent indicated data and agree with findings including:  [x]  Laboratory  [x]  Radiology  [x]  EKG/Telemetry  []  Pathology  []  Cardiac/Vascular Studies  []  Old records  []  Other:  Most pertinent findings include:      LAB RESULTS:      Lab 08/11/21  1318   WBC 10.89*   HEMOGLOBIN 15.2   HEMATOCRIT 45.3   PLATELETS 271   NEUTROS ABS 7.24*   IMMATURE GRANS (ABS) 0.05   LYMPHS ABS 1.64   MONOS ABS 0.74   EOS ABS 1.15*   MCV 91.0         Lab 08/11/21  1318   SODIUM 135*   POTASSIUM 4.9   CHLORIDE 97*   CO2 20.0*   ANION GAP 18.0*   BUN 67*   CREATININE 8.10*   GLUCOSE 170*   CALCIUM 10.3         Lab 08/11/21  1318   TOTAL  PROTEIN 8.3   ALBUMIN 4.60   GLOBULIN 3.7   ALT (SGPT) 7   AST (SGOT) 17   BILIRUBIN 0.3   ALK PHOS 103   LIPASE 223*         Lab 08/11/21  1651 08/11/21  1318   PROBNP  --  309.6   TROPONIN T <0.010 0.010                 Brief Urine Lab Results  (Last result in the past 365 days)      Color   Clarity   Blood   Leuk Est   Nitrite   Protein   CREAT   Urine HCG        08/11/21 1747 Yellow Clear Trace Negative Negative 100 mg/dL (2+)             Microbiology Results (last 10 days)     ** No results found for the last 240 hours. **          CT Abdomen Pelvis Without Contrast    Result Date: 8/11/2021  EXAMINATION: CT ABDOMEN PELVIS WO CONTRAST-  INDICATION: acute renal failure  TECHNIQUE: Axial noncontrast CT of the abdomen and pelvis with multiplanar reconstruction  The radiation dose reduction device was turned on for each scan per the ALARA (As Low as Reasonably Achievable) protocol.  COMPARISON: NONE  FINDINGS: The lung bases are grossly clear. The body wall soft tissues are unremarkable. There is no evidence of acute fracture or aggressive osseous lesion. The liver, spleen, pancreas and bilateral adrenal glands demonstrate no evidence of suspicious focal lesion. Noncontrast evaluation of the kidneys demonstrates no specific evidence of hydronephrosis, obstruction or nephropathy. Small and large bowel loops are nondilated. The appendix is normal. There is no free fluid or pneumoperitoneum. Unremarkable gallbladder. The pelvic viscera are unremarkable. Normal caliber atherosclerotic abdominal aorta. No bulky retroperitoneal adenopathy.      Impression: No acute findings in the abdomen and pelvis. Specifically no evidence of renal calculi, hydronephrosis or definite nephropathy.   This report was finalized on 8/11/2021 6:18 PM by Ricardo Pitt.      CT Head Without Contrast    Result Date: 8/11/2021  EXAMINATION: CT HEAD WO CONTRAST-  INDICATION: headache  TECHNIQUE: Axial noncontrast CT of the head with  multiplanar reconstruction  The radiation dose reduction device was turned on for each scan per the ALARA (As Low as Reasonably Achievable) protocol.  COMPARISON: NONE  FINDINGS: Gray-white differentiation is maintained and there is no evidence of intracranial hemorrhage, mass or mass effect. The ventricles are normal in size and configuration. The orbits are unremarkable and the paranasal sinuses are grossly clear. The calvarium is intact.      Impression: No acute intracranial abnormality.   This report was finalized on 8/11/2021 6:21 PM by Ricardo Pitt.      XR Chest PA & Lateral    Result Date: 8/11/2021  EXAMINATION: XR CHEST PA AND LATERAL- 08/11/2021  INDICATION: Chest Pain triage protocol  COMPARISON: NONE  FINDINGS: PA and lateral views of the chest reveal cardiac and mediastinal silhouettes within normal limits. The lung fields are grossly clear. No focal parenchymal opacification present. No pleural effusion or pneumothorax.  The bony structures are unremarkable.      Impression: No acute cardiopulmonary disease.  D:  08/11/2021 E:  08/11/2021             Assessment/Plan   Assessment & Plan       Acute renal failure (CMS/HCC)    Type 2 diabetes mellitus with hyperglycemia, without long-term current use of insulin (CMS/HCC)    Mixed hyperlipidemia    Elevated lipase    Leukocytosis      60 year old male presents to the ED accompanied by wife due to increased cough, shortness of air, fatigue and nausea and vomiting since last Thursday.      1) Acute renal failure  -creatinine 8.10  -likely secondary to recent increase of Rybelsus   -s/p 2 L normal saline in the ED   -consult nephrology in am  -place on SSI, hold Rybelsus, metformin, glipizide, actos   -hold nephrotic agents  -repeat BMP after IVF  -CT of abdomen and pelvis unremarkable  -continue IVF  -check urine studies    2) Elevated lipase  -? Underlying pancreatitis due to Rybelsus  -CT of abdomen and pelvis negative  -IVF, s/p 2L normal saline in  the ED  -continue IVF  -check FLP with triglycerides   -pain control     3) Leukocytosis   -patient reports just had dose of steroids from PCP, due to thinking symptoms secondary to allergies  -UA negative  -CXR negative  --monitor     4) Diabetes mellitus type 2  -check hgb A1c   -start ldssi   -fingersticks achs    5) Hyperlipidemia  -hold statin   -check FLP, triglycerides     DVT prophylaxis:  scds    CODE STATUS:  Full code   There are no questions and answers to display.       This note has been completed as part of a split-shared workflow.     Signature: Electronically signed by AMBAR Rg, 08/11/21, 9:05 PM EDT.      Attending   Admission Attestation       I have seen and examined the patient, performing an independent face-to-face diagnostic evaluation with plan of care reviewed and developed with the advanced practice clinician (APC).      Brief Summary Statement:   Ceasar Hawk is a 60 y.o. male past medical history significant for hyperlipidemia and diabetes mellitus type 2 presents the ED accompanied by wife due to generalized headache.  Patient reports that the headache started last Thursday.  He also has associated fever, cough, nausea and vomiting and mild shortness of breath.  3 weeks ago, his endocrinologist increased his Rybelsus dose.  Patient denies any decreased urine output.  He denies any fevers chills.  Labs revealed acute renal failure with a creatinine of 8.10 a mild metabolic acidosis of 20.  He has a mild leukocytosis of 10.  We will continue IV fluids overnight.  Nephrology consult in the a.m.      Remainder of detailed HPI is as noted by APC and has been reviewed and/or edited by me for completeness.    Attending Physical Exam:  Constitutional: Awake, alert, no acute distress, sitting in bedside chair  Eyes: sclerae anicteric, no conjunctival injection  HENT: NCAT, mucous membranes moist  Neck: Supple, no thyromegaly, no lymphadenopathy, trachea midline  Respiratory: Clear to  auscultation bilaterally, nonlabored respirations   Cardiovascular: RRR, no murmurs, rubs, or gallops, palpable pedal pulses bilaterally  Gastrointestinal: Positive bowel sounds, soft, nontender, nondistended  Musculoskeletal: No bilateral ankle edema, no clubbing or cyanosis to extremities  Psychiatric: Appropriate affect, cooperative  Neurologic: Oriented x 3, normal gait, no focal neurological deficits  Skin: No rashes      Brief Assessment/Plan :  See detailed assessment and plan developed with APC which I have reviewed and/or edited for completeness.        Admission Status: I believe that this patient meets INPATIENT status due to ARF.  I feel patient’s risk for adverse outcomes and need for care warrant INPATIENT evaluation and I predict the patient’s care encounter to likely last beyond 2 midnights.        Codi Choe MD  08/11/21

## 2021-08-13 LAB
ALBUMIN SERPL-MCNC: 3.3 G/DL (ref 3.5–5.2)
ANION GAP SERPL CALCULATED.3IONS-SCNC: 12 MMOL/L (ref 5–15)
BUN SERPL-MCNC: 50 MG/DL (ref 8–23)
BUN/CREAT SERPL: 10.5 (ref 7–25)
CALCIUM SPEC-SCNC: 8.8 MG/DL (ref 8.6–10.5)
CHLORIDE SERPL-SCNC: 113 MMOL/L (ref 98–107)
CO2 SERPL-SCNC: 18 MMOL/L (ref 22–29)
CREAT SERPL-MCNC: 4.78 MG/DL (ref 0.76–1.27)
GFR SERPL CREATININE-BSD FRML MDRD: 12 ML/MIN/1.73
GFR SERPL CREATININE-BSD FRML MDRD: ABNORMAL ML/MIN/{1.73_M2}
GLUCOSE BLDC GLUCOMTR-MCNC: 131 MG/DL (ref 70–130)
GLUCOSE BLDC GLUCOMTR-MCNC: 146 MG/DL (ref 70–130)
GLUCOSE BLDC GLUCOMTR-MCNC: 192 MG/DL (ref 70–130)
GLUCOSE BLDC GLUCOMTR-MCNC: 248 MG/DL (ref 70–130)
GLUCOSE SERPL-MCNC: 155 MG/DL (ref 65–99)
PHOSPHATE SERPL-MCNC: 3.5 MG/DL (ref 2.5–4.5)
POTASSIUM SERPL-SCNC: 4.3 MMOL/L (ref 3.5–5.2)
SODIUM SERPL-SCNC: 143 MMOL/L (ref 136–145)

## 2021-08-13 PROCEDURE — 99232 SBSQ HOSP IP/OBS MODERATE 35: CPT | Performed by: NURSE PRACTITIONER

## 2021-08-13 PROCEDURE — 80069 RENAL FUNCTION PANEL: CPT | Performed by: INTERNAL MEDICINE

## 2021-08-13 PROCEDURE — G0108 DIAB MANAGE TRN  PER INDIV: HCPCS

## 2021-08-13 PROCEDURE — 63710000001 INSULIN LISPRO (HUMAN) PER 5 UNITS: Performed by: NURSE PRACTITIONER

## 2021-08-13 PROCEDURE — 82962 GLUCOSE BLOOD TEST: CPT

## 2021-08-13 RX ADMIN — INSULIN LISPRO 3 UNITS: 100 INJECTION, SOLUTION INTRAVENOUS; SUBCUTANEOUS at 16:45

## 2021-08-13 RX ADMIN — ACETAMINOPHEN 650 MG: 325 TABLET, FILM COATED ORAL at 01:31

## 2021-08-13 RX ADMIN — SODIUM CHLORIDE 125 ML/HR: 9 INJECTION, SOLUTION INTRAVENOUS at 05:36

## 2021-08-13 RX ADMIN — ACETAMINOPHEN 650 MG: 325 TABLET, FILM COATED ORAL at 21:42

## 2021-08-13 RX ADMIN — ACETAMINOPHEN 650 MG: 325 TABLET, FILM COATED ORAL at 09:25

## 2021-08-13 NOTE — PLAN OF CARE
Goal Outcome Evaluation:              Outcome Summary: patient is alert, roomair, up adl ib, educated on voiding in urinal, tolerating diet, no c.o at this time, will cont to monitor

## 2021-08-13 NOTE — PLAN OF CARE
Goal Outcome Evaluation:           Progress: no change   VSS, RA, A/Ox4. Renal ultrasound earlier this shift. Complaints of headache treated with PRNs. No other complaints at this time. Will continue plan of care.

## 2021-08-13 NOTE — PROGRESS NOTES
[facility] Medicine Services  PROGRESS NOTE    Patient Name: Ceasar Hawk  : 1960  MRN: [id number]    Date of Admission: 2021  Primary Care Physician: Carin Dorman PA    Subjective   Subjective     CC:  Follow-up renal failure    HPI:  Seen resting up in bed awake alert.  States he feels better today..  Hungry currently.  Has mild headache, chronically.  No nausea, vomiting.  Making good urine.  Reports that his glucose ran high briefly yesterday because he snuck a candy bar.  No new issues.  Hopes for discharge home tomorrow.    ROS:  Gen- No fevers, chills  CV- No chest pain, palpitations  Resp- No cough, dyspnea  GI- No N/V/D, abd pain    Objective   Objective     Vital Signs:   Temp:  [97.5 °F (36.4 °C)-98.3 °F (36.8 °C)] 97.9 °F (36.6 °C)  Heart Rate:  [61-89] 69  Resp:  [16-18] 16  BP: (110-155)/(65-95) 155/95     Physical Exam:  Constitutional: No acute distress, awake, alert, lying back in bed  HENT: NCAT, mucous membranes moist.  Anicteric  Respiratory: Clear to auscultation bilaterally, respiratory effort normal on room air.  Cardiovascular: RRR, no murmurs, rubs, or gallops, palpable radial pulses  Gastrointestinal: Positive bowel sounds, soft, nontender, nondistended  Musculoskeletal: No bilateral ankle edema.  Mijares spontaneously.  Psychiatric: Appropriate affect, cooperative  Neurologic: Awake, alert, oriented x3.  Speech clear, moving all extremity symmetrically.  Speech clear and appropriate.  Skin: Dry and intact.  No jaundice.    Results Reviewed:  LAB RESULTS:      Lab 21  0646 21  1318   WBC 7.28 10.89*   HEMOGLOBIN 13.2 15.2   HEMATOCRIT 39.6 45.3   PLATELETS 183 271   NEUTROS ABS 4.95 7.24*   IMMATURE GRANS (ABS) 0.04 0.05   LYMPHS ABS 1.34 1.64   MONOS ABS 0.80 0.74   EOS ABS 0.10 1.15*   MCV 91.0 91.0         Lab 21  0649 21  0646 21  2243 21  1318   SODIUM 143 138 138 135*   POTASSIUM 4.3 4.2 4.4 4.9   CHLORIDE 113*  108* 103 97*   CO2 18.0* 17.0* 18.0* 20.0*   ANION GAP 12.0 13.0 17.0* 18.0*   BUN 50* 61* 63* 67*   CREATININE 4.78* 6.23* 6.69* 8.10*   GLUCOSE 155* 137* 163* 170*   CALCIUM 8.8 8.8 9.2 10.3   PHOSPHORUS 3.5  --   --   --    HEMOGLOBIN A1C  --  8.00*  --   --          Lab 08/13/21  0649 08/12/21  0646 08/11/21  1318   TOTAL PROTEIN  --  6.7 8.3   ALBUMIN 3.30* 3.60 4.60   GLOBULIN  --  3.1 3.7   ALT (SGPT)  --  6 7   AST (SGOT)  --  11 17   BILIRUBIN  --  0.3 0.3   ALK PHOS  --  79 103   LIPASE  --   --  223*         Lab 08/11/21  1651 08/11/21  1318   PROBNP  --  309.6   TROPONIN T <0.010 0.010                 Brief Urine Lab Results  (Last result in the past 365 days)      Color   Clarity   Blood   Leuk Est   Nitrite   Protein   CREAT   Urine HCG        08/12/21 1422             46.3             Microbiology Results Abnormal     Procedure Component Value - Date/Time    Eosinophil Smear - Urine, Urine, Clean Catch [[id number]]  (Normal) Collected: 08/12/21 1422    Lab Status: Final result Specimen: Urine, Clean Catch Updated: 08/12/21 1700     Eosinophil Smear 0 % EOS/100 Cells     Narrative:      No eosinophil seen    COVID PRE-OP / PRE-PROCEDURE SCREENING ORDER (NO ISOLATION) - Swab, Nasopharynx [[id number]]  (Normal) Collected: 08/11/21 2006    Lab Status: Final result Specimen: Swab from Nasopharynx Updated: 08/11/21 2037    Narrative:      The following orders were created for panel order COVID PRE-OP / PRE-PROCEDURE SCREENING ORDER (NO ISOLATION) - Swab, Nasopharynx.  Procedure                               Abnormality         Status                     ---------                               -----------         ------                     COVID-19 and FLU A/B PCR...[[id number]]  Normal              Final result                 Please view results for these tests on the individual orders.    COVID-19 and FLU A/B PCR - Swab, Nasopharynx [[id number]]  (Normal) Collected: 08/11/21 2006    Lab Status: Final result  Specimen: Swab from Nasopharynx Updated: 08/11/21 2037     COVID19 Not Detected     Influenza A PCR Not Detected     Influenza B PCR Not Detected    Narrative:      Fact sheet for providers: https://www.fda.gov/media/879030/download    Fact sheet for patients: https://www.fda.gov/media/146782/download    Test performed by PCR.          CT Abdomen Pelvis Without Contrast    Result Date: 8/11/2021  EXAMINATION: CT ABDOMEN PELVIS WO CONTRAST-  INDICATION: acute renal failure  TECHNIQUE: Axial noncontrast CT of the abdomen and pelvis with multiplanar reconstruction  The radiation dose reduction device was turned on for each scan per the ALARA (As Low as Reasonably Achievable) protocol.  COMPARISON: NONE  FINDINGS: The lung bases are grossly clear. The body wall soft tissues are unremarkable. There is no evidence of acute fracture or aggressive osseous lesion. The liver, spleen, pancreas and bilateral adrenal glands demonstrate no evidence of suspicious focal lesion. Noncontrast evaluation of the kidneys demonstrates no specific evidence of hydronephrosis, obstruction or nephropathy. Small and large bowel loops are nondilated. The appendix is normal. There is no free fluid or pneumoperitoneum. Unremarkable gallbladder. The pelvic viscera are unremarkable. Normal caliber atherosclerotic abdominal aorta. No bulky retroperitoneal adenopathy.      Impression: No acute findings in the abdomen and pelvis. Specifically no evidence of renal calculi, hydronephrosis or definite nephropathy.   This report was finalized on 8/11/2021 6:18 PM by Ricardo Pitt.      CT Head Without Contrast    Result Date: 8/11/2021  EXAMINATION: CT HEAD WO CONTRAST-  INDICATION: headache  TECHNIQUE: Axial noncontrast CT of the head with multiplanar reconstruction  The radiation dose reduction device was turned on for each scan per the ALARA (As Low as Reasonably Achievable) protocol.  COMPARISON: NONE  FINDINGS: Gray-white differentiation is  maintained and there is no evidence of intracranial hemorrhage, mass or mass effect. The ventricles are normal in size and configuration. The orbits are unremarkable and the paranasal sinuses are grossly clear. The calvarium is intact.      Impression: No acute intracranial abnormality.   This report was finalized on 8/11/2021 6:21 PM by Ricardo Pitt.      US Renal Limited    Result Date: 8/13/2021  EXAMINATION: US RENAL LIMITED-  INDICATION: N17.9-Acute kidney failure, unspecified; R74.8-Abnormal levels of other serum enzymes; E86.0-Dehydration.  TECHNIQUE: Sonographic imaging was obtained of the kidneys in both the sagittal and transverse planes.  COMPARISON: None.  FINDINGS: The right kidney measures in length from pole to pole 11.6 cm. No solid cortical mass or renal cortical cyst is seen within the right kidney. No hydronephrosis or nephrolithiasis.  The left kidney measures in length from pole to pole 12.2 cm. No solid cortical mass or renal cortical cyst is seen within the left kidney. No hydronephrosis or nephrolithiasis. The bladder is incompletely distended.      Impression: Unremarkable bilateral renal ultrasound.   D:  08/13/2021 E:  08/13/2021            I have reviewed the medications:  Scheduled Meds:insulin lispro, 0-7 Units, Subcutaneous, TID AC      Continuous Infusions:sodium chloride, 125 mL/hr, Last Rate: 125 mL/hr (08/13/21 0536)      PRN Meds:.•  acetaminophen  •  dextrose  •  dextrose  •  glucagon (human recombinant)  •  HYDROcodone-acetaminophen  •  ondansetron  •  ondansetron **OR** [DISCONTINUED] ondansetron  •  sodium chloride    Assessment/Plan   Assessment & Plan     Active Hospital Problems    Diagnosis  POA   • **Acute renal failure (CMS/HCC) [N17.9]  Yes   • Elevated lipase [R74.8]  Yes   • Leukocytosis [D72.829]  Yes   • Mixed hyperlipidemia [E78.2]  Yes   • Type 2 diabetes mellitus with hyperglycemia, without long-term current use of insulin (CMS/HCC) [E11.65]  Yes       Resolved Hospital Problems   No resolved problems to display.        Brief Hospital Course to date:  Ceasar Hawk is a 60 y.o. male with HLD, DM 2 with recent increase in his semaglutide initially presenting with fatigue, mild cough, N/V found to have a creatinine of 8.1 impaired to normal 5 months ago    This patient's problems and plans were partially entered by my partner and updated as appropriate by me 08/13/21.    Assessment/plan:  Patient is new to me today    Acute renal failure  -Multiple possible etiologies.  Nephrology following.  Felt likely due to volume depletion in conjunction with semaglutide and NSAID use.  -Holding nephrotoxic meds, strict intake and output  -UA mild, no obstructive uropathy evident on CT of the A/P  -Continue IVF's  -Urine studies pending  -Nephrology consulted.  --Creatinine down from 8.1 on admission to 4.78 today.  --Per nephrology, possibly home tomorrow pending labs, with [facility]up [facility] [place] on 9/13/2021 at 11:30 AM.  --A.m. labs    DM type 2, A1c 8.0%, without long-term use of insulin  -Continue Accu-Cheks with SSI  --Glucose generally stable.  I briefly yesterday which patient reports is due to eating a candy bar.  Continue to monitor and adjust as needed.    Hyperlipidemia  -Statin held on admission, unclear cause, restart at discharge if stable    DVT prophylaxis:  Mechanical DVT prophylaxis orders are present.       AM-PAC 6 Clicks Score (PT): 24 (08/13/21 0800)    Disposition: I expect the patient to be discharged in 1-2 days presuming continued improvement in renal function and stable clinical course, and pending final nephrology recommendations.    CODE STATUS:   Code Status and Medical Interventions:   Ordered at: 08/11/21 2112     Level Of Support Discussed With:    Patient     Code Status:    CPR     Medical Interventions (Level of Support Prior to Arrest):    Full       Ignacia Andrews, APRN  08/13/21

## 2021-08-13 NOTE — PROGRESS NOTES
"   LOS: 2 days    Patient Care Team:  Carin Dorman PA as PCP - General (Internal Medicine)  Annette Lau DO as Consulting Physician (Endocrinology)  Madison Marcus, STEPHANIE as Ambulatory  ([facility])    Reason For Visit:  F/U AMBROCIO.  Subjective           Review of Systems:    Pulm: No soa   CV:  No CP. GI: NO N/V/D. SKIN: NO ITCHING.      Objective     insulin lispro, 0-7 Units, Subcutaneous, TID AC      sodium chloride, 125 mL/hr, Last Rate: 125 mL/hr (08/13/21 0536)          Vital Signs:  Blood pressure 140/86, pulse 62, temperature 97.5 °F (36.4 °C), temperature source Oral, resp. rate 18, height 182.9 cm (72.01\"), weight 88 kg (194 lb), SpO2 95 %.    Flowsheet Rows      First Filed Value   Admission Height  182.9 cm (72\") Documented at 08/11/2021 1302   Admission Weight  88 kg (194 lb) Documented at 08/11/2021 1302          08/12 0701 - 08/13 0700  In: 500 [P.O.:500]  Out: 1000 [Urine:1000]    Physical Exam:    General Appearance: NAD, alert and cooperative, Ox3  Eyes: PER, conjunctivae and sclerae normal, no icterus  Lungs: respirations regular and unlabored, no crepitus, clear to auscultation  Heart/CV: regular rhythm & normal rate, no murmur, no gallop, no rub and no edema  Abdomen: not distended, soft, non-tender, no masses,  bowel sounds present  Skin: No rash, Warm and dry    Radiology: RENAL U/S NORMAL            Labs: P/C RATIO 272. U EOS NEG.  Results from last 7 days   Lab Units 08/12/21  0646 08/11/21  1318   WBC 10*3/mm3 7.28 10.89*   HEMOGLOBIN g/dL 13.2 15.2   HEMATOCRIT % 39.6 45.3   PLATELETS 10*3/mm3 183 271     Results from last 7 days   Lab Units 08/13/21  0649 08/12/21  0646 08/11/21  2243 08/11/21  1318   SODIUM mmol/L 143 138 138 135*   POTASSIUM mmol/L 4.3 4.2 4.4 4.9   CHLORIDE mmol/L 113* 108* 103 97*   CO2 mmol/L 18.0* 17.0* 18.0* 20.0*   BUN mg/dL 50* 61* 63* 67*   CREATININE mg/dL 4.78* 6.23* 6.69* 8.10*   CALCIUM mg/dL 8.8 8.8 9.2 10.3   PHOSPHORUS mg/dL 3.5  -- "   --   --    ALBUMIN g/dL 3.30* 3.60  --  4.60     Results from last 7 days   Lab Units 08/13/21  0649   GLUCOSE mg/dL 155*       Results from last 7 days   Lab Units 08/12/21  0646   ALK PHOS U/L 79   BILIRUBIN mg/dL 0.3   ALT (SGPT) U/L 6   AST (SGOT) U/L 11           Results from last 7 days   Lab Units 08/11/21  1747   COLOR UA  Yellow   CLARITY UA  Clear   PH, URINE  <=5.0   SPECIFIC GRAVITY, URINE  1.015   GLUCOSE UA  100 mg/dL (Trace)*   KETONES UA  Trace*   BILIRUBIN UA  Negative   PROTEIN UA  100 mg/dL (2+)*   BLOOD UA  Trace*   LEUKOCYTES UA  Negative   NITRITE UA  Negative       Estimated Creatinine Clearance: 19.9 mL/min (A) (by C-G formula based on SCr of 4.78 mg/dL (H)).      Assessment       Acute renal failure (CMS/HCC)    Type 2 diabetes mellitus with hyperglycemia, without long-term current use of insulin (CMS/HCC)    Mixed hyperlipidemia    Elevated lipase    Leukocytosis            Impression: NONOLIGURIC ABMROCIO WITH IMPROVING GFR. LIKELY VOLUME DEPLETION WITH SEMAGULTIDE AND NSAIA USE. DM.            Recommendations: CONTINUE IVF'S. ? DISCHARGE HOME Saturday 8/14/21 AND NAL F/U APPT [facility] 9/13/21 AT 11:30 AM.      Ricardo Szymanski MD  08/13/21  08:57 EDT

## 2021-08-13 NOTE — CONSULTS
"Diabetes Education    Patient Name:  Ceasar Hawk  YOB: 1960  MRN: [id number]  Admit Date:  8/11/2021        Saw Mr. Hawk at bedside for diabetes education consult; pt gave permission for visit. Pt confirms he takes metformin at home, he is unsure about pioglitazone (actos). He states insulin at home has not been mentioned to him but he is aware he is receiving insulin in the hospital. He has 2 glucose meters, one that he keeps at work and one at home. He checks blood sugars at least twice/day, fasting and premeal, with results ranging 130-160. We reviewed ADA recommended target range for fasting/premeal. Also discussed checking 2 hr post meal blood sugar and how this can show him how different foods/meals/portion sizes are affecting his blood sugar. Rec alternating which meal he checks after. He states he checks premeal blood sugar sometimes to determine if he can have small soda with meal or needs to drink water. Reviewed basic nutrition guidelines, importance of consistent carbs and avoiding sugary drinks. Reviewed his current A1c and ADA rec target goal less than 7%, basic diabetes pathophysiology and why elevated blood sugar increases risk for complications. Pt would benefit from outpatient diabetes education, he states he will consider. Provided [facility] \"blood glucose goals\" and \"what is A1c\" handouts and our contact info. Please contact if pt needs discharge teaching for new diabetes medications. Thank you for the consult.       Electronically signed by:  Mary Ann Cast RN, [facility]  08/13/21 16:30 EDT  "

## 2021-08-14 ENCOUNTER — READMISSION MANAGEMENT (OUTPATIENT)
Dept: CALL CENTER | Facility: HOSPITAL | Age: 61
End: 2021-08-14

## 2021-08-14 VITALS
DIASTOLIC BLOOD PRESSURE: 90 MMHG | HEIGHT: 72 IN | RESPIRATION RATE: 16 BRPM | HEART RATE: 69 BPM | WEIGHT: 194 LBS | TEMPERATURE: 98.1 F | OXYGEN SATURATION: 93 % | BODY MASS INDEX: 26.28 KG/M2 | SYSTOLIC BLOOD PRESSURE: 158 MMHG

## 2021-08-14 PROBLEM — D72.829 LEUKOCYTOSIS: Status: RESOLVED | Noted: 2021-08-11 | Resolved: 2021-08-14

## 2021-08-14 LAB
ALBUMIN SERPL-MCNC: 3.2 G/DL (ref 3.5–5.2)
ANION GAP SERPL CALCULATED.3IONS-SCNC: 11 MMOL/L (ref 5–15)
BUN SERPL-MCNC: 40 MG/DL (ref 8–23)
BUN/CREAT SERPL: 10.8 (ref 7–25)
CALCIUM SPEC-SCNC: 8.7 MG/DL (ref 8.6–10.5)
CHLORIDE SERPL-SCNC: 112 MMOL/L (ref 98–107)
CO2 SERPL-SCNC: 18 MMOL/L (ref 22–29)
CREAT SERPL-MCNC: 3.69 MG/DL (ref 0.76–1.27)
DEPRECATED RDW RBC AUTO: 41 FL (ref 37–54)
ERYTHROCYTE [DISTWIDTH] IN BLOOD BY AUTOMATED COUNT: 12.3 % (ref 12.3–15.4)
GFR SERPL CREATININE-BSD FRML MDRD: 17 ML/MIN/1.73
GLUCOSE BLDC GLUCOMTR-MCNC: 153 MG/DL (ref 70–130)
GLUCOSE BLDC GLUCOMTR-MCNC: 179 MG/DL (ref 70–130)
GLUCOSE SERPL-MCNC: 131 MG/DL (ref 65–99)
HCT VFR BLD AUTO: 34.2 % (ref 37.5–51)
HGB BLD-MCNC: 11.7 G/DL (ref 13–17.7)
MCH RBC QN AUTO: 31.1 PG (ref 26.6–33)
MCHC RBC AUTO-ENTMCNC: 34.2 G/DL (ref 31.5–35.7)
MCV RBC AUTO: 91 FL (ref 79–97)
PHOSPHATE SERPL-MCNC: 3.7 MG/DL (ref 2.5–4.5)
PLATELET # BLD AUTO: 182 10*3/MM3 (ref 140–450)
PMV BLD AUTO: 10.3 FL (ref 6–12)
POTASSIUM SERPL-SCNC: 3.9 MMOL/L (ref 3.5–5.2)
RBC # BLD AUTO: 3.76 10*6/MM3 (ref 4.14–5.8)
SODIUM SERPL-SCNC: 141 MMOL/L (ref 136–145)
WBC # BLD AUTO: 7.13 10*3/MM3 (ref 3.4–10.8)

## 2021-08-14 PROCEDURE — 85027 COMPLETE CBC AUTOMATED: CPT | Performed by: NURSE PRACTITIONER

## 2021-08-14 PROCEDURE — 80069 RENAL FUNCTION PANEL: CPT | Performed by: INTERNAL MEDICINE

## 2021-08-14 PROCEDURE — 63710000001 INSULIN LISPRO (HUMAN) PER 5 UNITS: Performed by: NURSE PRACTITIONER

## 2021-08-14 PROCEDURE — 99239 HOSP IP/OBS DSCHRG MGMT >30: CPT | Performed by: NURSE PRACTITIONER

## 2021-08-14 PROCEDURE — 82962 GLUCOSE BLOOD TEST: CPT

## 2021-08-14 RX ORDER — ACETAMINOPHEN 325 MG/1
650 TABLET ORAL EVERY 6 HOURS PRN
Start: 2021-08-14 | End: 2021-11-10

## 2021-08-14 RX ADMIN — SODIUM CHLORIDE 125 ML/HR: 9 INJECTION, SOLUTION INTRAVENOUS at 03:56

## 2021-08-14 RX ADMIN — INSULIN LISPRO 2 UNITS: 100 INJECTION, SOLUTION INTRAVENOUS; SUBCUTANEOUS at 08:24

## 2021-08-14 NOTE — PLAN OF CARE
Goal Outcome Evaluation:  Plan of Care Reviewed With: patient        Progress: no change   A/Ox4; VSS; currently on room air; and NSR on the monitor. C/O headache this shift and prn medication administered. New IV site this shift as well. Will continue to monitor.

## 2021-08-14 NOTE — DISCHARGE SUMMARY
[facility] Medicine Services  DISCHARGE SUMMARY    Patient Name: Ceasar Hawk  : 1960  MRN: [id number]    Date of Admission: 2021  3:57 PM  Date of Discharge:  2021  Primary Care Physician: Carin Dorman PA    Consults     Date and Time Order Name Status Description    2021 12:36 AM Inpatient Nephrology Consult Completed           Hospital Course     Presenting Problem:   Acute renal failure, unspecified acute renal failure type (CMS/HCC) [N17.9]    Active Hospital Problems    Diagnosis  POA   • **Acute renal failure (CMS/HCC) [N17.9]  Yes   • Elevated lipase [R74.8]  Yes   • Mixed hyperlipidemia [E78.2]  Yes   • Type 2 diabetes mellitus with hyperglycemia, without long-term current use of insulin (CMS/HCC) [E11.65]  Yes      Resolved Hospital Problems    Diagnosis Date Resolved POA   • Leukocytosis [D72.829] 2021 Yes          Hospital Course:  Ceasar Hawk is a 60 y.o. male with HLD, DM 2 with recent increase in his semaglutide initially presenting with fatigue, mild cough, N/V found to have a creatinine of 8.1 compared to normal 5 months ago     Acute renal failure- improving  -Multiple possible etiologies.  Nephrology following.  Felt likely due to volume depletion in conjunction with semaglutide and NSAID use.  -Holding nephrotoxic meds, strict intake and output  -UA mild, no obstructive uropathy evident on CT of the A/P  -Continue IVF while inpatient  -Urine studies reviewed  -Nephrology consulted.  --Creatinine down from 8.1 on admission to 3.69 today.  --Per nephrology, home today with [facility] [place] on 2021 at 11:30 AM.  --close follow up with PCP for repeat labs/ DM management     DM type 2, A1c 8.0%, without long-term use of insulin  -stop home semaglutide per renal instructions     Hyperlipidemia  Elevated lipase  -Statin held on admission, unclear cause, restart with follow up monitoring per  pcp      Discharge Follow Up Recommendations for outpatient labs/diagnostics:  PCP follow up one week for DM review and repeat BMP to assess renal function and lipase. Will restart statin for now  [facility] office 9/13 @ 1130  DC ALL NSAIDs and Semaglutide     Day of Discharge     HPI:   Patient feels well. Tolerating diet and urinating well. Wants to go home.    Review of Systems  Gen- No fevers, chills  CV- No chest pain, palpitations  Resp- No cough, dyspnea  GI- No N/V/D, abd pain        Vital Signs:   Temp:  [97.8 °F (36.6 °C)-98.6 °F (37 °C)] 98 °F (36.7 °C)  Heart Rate:  [62-83] 66  Resp:  [14-16] 16  BP: (122-155)/(64-97) 147/92     Physical Exam:  Constitutional: No acute distress, awake, alert  HENT: NCAT, mucous membranes moist  Respiratory: Clear to auscultation bilaterally, respiratory effort normal   Cardiovascular: RRR, no murmurs, rubs, or gallops  Gastrointestinal: Positive bowel sounds, soft, nontender, nondistended  Musculoskeletal: No bilateral ankle edema  Psychiatric: Appropriate affect, cooperative  Neurologic: Oriented x 3, strength symmetric in all extremities, Cranial Nerves grossly intact to confrontation, speech clear  Skin: No rashes      Pertinent  and/or Most Recent Results     LAB RESULTS:      Lab 08/14/21  0340 08/12/21  0646 08/11/21  1318   WBC 7.13 7.28 10.89*   HEMOGLOBIN 11.7* 13.2 15.2   HEMATOCRIT 34.2* 39.6 45.3   PLATELETS 182 183 271   NEUTROS ABS  --  4.95 7.24*   IMMATURE GRANS (ABS)  --  0.04 0.05   LYMPHS ABS  --  1.34 1.64   MONOS ABS  --  0.80 0.74   EOS ABS  --  0.10 1.15*   MCV 91.0 91.0 91.0         Lab 08/14/21  0340 08/13/21  0649 08/12/21  0646 08/11/21  2243 08/11/21  1318   SODIUM 141 143 138 138 135*   POTASSIUM 3.9 4.3 4.2 4.4 4.9   CHLORIDE 112* 113* 108* 103 97*   CO2 18.0* 18.0* 17.0* 18.0* 20.0*   ANION GAP 11.0 12.0 13.0 17.0* 18.0*   BUN 40* 50* 61* 63* 67*   CREATININE 3.69* 4.78* 6.23* 6.69* 8.10*   GLUCOSE 131* 155* 137* 163* 170*   CALCIUM  8.7 8.8 8.8 9.2 10.3   PHOSPHORUS 3.7 3.5  --   --   --    HEMOGLOBIN A1C  --   --  8.00*  --   --          Lab 08/14/21  0340 08/13/21  0649 08/12/21  0646 08/11/21  1318   TOTAL PROTEIN  --   --  6.7 8.3   ALBUMIN 3.20* 3.30* 3.60 4.60   GLOBULIN  --   --  3.1 3.7   ALT (SGPT)  --   --  6 7   AST (SGOT)  --   --  11 17   BILIRUBIN  --   --  0.3 0.3   ALK PHOS  --   --  79 103   LIPASE  --   --   --  223*         Lab 08/11/21  1651 08/11/21  1318   PROBNP  --  309.6   TROPONIN T <0.010 0.010                 Brief Urine Lab Results  (Last result in the past 365 days)      Color   Clarity   Blood   Leuk Est   Nitrite   Protein   CREAT   Urine HCG        08/12/21 1422             46.3           Microbiology Results (last 10 days)     Procedure Component Value - Date/Time    Eosinophil Smear - Urine, Urine, Clean Catch [[id number]]  (Normal) Collected: 08/12/21 1422    Lab Status: Final result Specimen: Urine, Clean Catch Updated: 08/12/21 1700     Eosinophil Smear 0 % EOS/100 Cells     Narrative:      No eosinophil seen    COVID PRE-OP / PRE-PROCEDURE SCREENING ORDER (NO ISOLATION) - Swab, Nasopharynx [[id number]]  (Normal) Collected: 08/11/21 2006    Lab Status: Final result Specimen: Swab from Nasopharynx Updated: 08/11/21 2037    Narrative:      The following orders were created for panel order COVID PRE-OP / PRE-PROCEDURE SCREENING ORDER (NO ISOLATION) - Swab, Nasopharynx.  Procedure                               Abnormality         Status                     ---------                               -----------         ------                     COVID-19 and FLU A/B PCR...[[id number]]  Normal              Final result                 Please view results for these tests on the individual orders.    COVID-19 and FLU A/B PCR - Swab, Nasopharynx [[id number]]  (Normal) Collected: 08/11/21 2006    Lab Status: Final result Specimen: Swab from Nasopharynx Updated: 08/11/21 2037     COVID19 Not Detected     Influenza A PCR Not  Detected     Influenza B PCR Not Detected    Narrative:      Fact sheet for providers: https://www.fda.gov/media/633987/download    Fact sheet for patients: https://www.fda.gov/media/467411/download    Test performed by PCR.          CT Abdomen Pelvis Without Contrast    Result Date: 8/11/2021  EXAMINATION: CT ABDOMEN PELVIS WO CONTRAST-  INDICATION: acute renal failure  TECHNIQUE: Axial noncontrast CT of the abdomen and pelvis with multiplanar reconstruction  The radiation dose reduction device was turned on for each scan per the ALARA (As Low as Reasonably Achievable) protocol.  COMPARISON: NONE  FINDINGS: The lung bases are grossly clear. The body wall soft tissues are unremarkable. There is no evidence of acute fracture or aggressive osseous lesion. The liver, spleen, pancreas and bilateral adrenal glands demonstrate no evidence of suspicious focal lesion. Noncontrast evaluation of the kidneys demonstrates no specific evidence of hydronephrosis, obstruction or nephropathy. Small and large bowel loops are nondilated. The appendix is normal. There is no free fluid or pneumoperitoneum. Unremarkable gallbladder. The pelvic viscera are unremarkable. Normal caliber atherosclerotic abdominal aorta. No bulky retroperitoneal adenopathy.      No acute findings in the abdomen and pelvis. Specifically no evidence of renal calculi, hydronephrosis or definite nephropathy.   This report was finalized on 8/11/2021 6:18 PM by Ricardo Pitt.      CT Head Without Contrast    Result Date: 8/11/2021  EXAMINATION: CT HEAD WO CONTRAST-  INDICATION: headache  TECHNIQUE: Axial noncontrast CT of the head with multiplanar reconstruction  The radiation dose reduction device was turned on for each scan per the ALARA (As Low as Reasonably Achievable) protocol.  COMPARISON: NONE  FINDINGS: Gray-white differentiation is maintained and there is no evidence of intracranial hemorrhage, mass or mass effect. The ventricles are normal in size and  configuration. The orbits are unremarkable and the paranasal sinuses are grossly clear. The calvarium is intact.      No acute intracranial abnormality.   This report was finalized on 8/11/2021 6:21 PM by Ricardo Pitt.      US Renal Limited    Result Date: 8/13/2021  EXAMINATION: US RENAL LIMITED-  INDICATION: N17.9-Acute kidney failure, unspecified; R74.8-Abnormal levels of other serum enzymes; E86.0-Dehydration.  TECHNIQUE: Sonographic imaging was obtained of the kidneys in both the sagittal and transverse planes.  COMPARISON: None.  FINDINGS: The right kidney measures in length from pole to pole 11.6 cm. No solid cortical mass or renal cortical cyst is seen within the right kidney. No hydronephrosis or nephrolithiasis.  The left kidney measures in length from pole to pole 12.2 cm. No solid cortical mass or renal cortical cyst is seen within the left kidney. No hydronephrosis or nephrolithiasis. The bladder is incompletely distended.      Unremarkable bilateral renal ultrasound.   D:  08/13/2021 E:  08/13/2021      XR Chest PA & Lateral    Result Date: 8/12/2021  EXAMINATION: XR CHEST PA AND LATERAL- 08/11/2021  INDICATION: Chest Pain triage protocol  COMPARISON: NONE  FINDINGS: PA and lateral views of the chest reveal cardiac and mediastinal silhouettes within normal limits. The lung fields are grossly clear. No focal parenchymal opacification present. No pleural effusion or pneumothorax.  The bony structures are unremarkable.      No acute cardiopulmonary disease.  D:  08/11/2021 E:  08/11/2021  This report was finalized on 8/12/2021 5:59 PM by Dr. Lesvia Arnold MD.                    Plan for Follow-up of Pending Labs/Results:     Discharge Details        Discharge Medications      New Medications      Instructions Start Date   acetaminophen 325 MG tablet  Commonly known as: TYLENOL   650 mg, Oral, Every 6 Hours PRN         Continue These Medications      Instructions Start Date   cyclobenzaprine 5 MG  tablet  Commonly known as: FLEXERIL   5 mg, Oral, 3 Times Daily PRN      metFORMIN  MG 24 hr tablet  Commonly known as: GLUCOPHAGE-XR   1,000 mg, Oral, 2 times daily      pioglitazone 30 MG tablet  Commonly known as: ACTOS   30 mg, Oral, Daily      rosuvastatin 20 MG tablet  Commonly known as: CRESTOR   20 mg, Oral, Daily      tadalafil 10 MG tablet  Commonly known as: Cialis   Take 1-2 tablets 1 hour prior to activity as needed for erectile dysfunction         Stop These Medications    Rybelsus 7 MG tablet  Generic drug: Semaglutide            No Known Allergies      Discharge Disposition:  Home or Self Care    Diet:  Hospital:  Diet Order   Procedures   • Diet Regular       Activity:  Activity Instructions     Activity as Tolerated            Restrictions or Other Recommendations:         CODE STATUS:    Code Status and Medical Interventions:   Ordered at: 08/11/21 2112     Level Of Support Discussed With:    Patient     Code Status:    CPR     Medical Interventions (Level of Support Prior to Arrest):    Full       Future Appointments   Date Time Provider Department Center   11/8/2021  8:00 AM Annette Lau DO [facility]   3/14/2022  9:45 AM Carin Dorman PA [facility]       Additional Instructions for the Follow-ups that You Need to Schedule     Discharge Follow-up with PCP   As directed       Currently Documented PCP:    Carin Dorman PA    PCP Phone Number:    [phone]     Follow Up Details: 1 week- BMP check         Discharge Follow-up with Specified Provider: LORENE 9/13 as scheduled at 1130am   As directed      To: LORENE 9/13 as scheduled at 1130am                     AMBAR Horton  08/14/21      Time Spent on Discharge:  I spent  37  minutes on this discharge activity which included: face-to-face encounter with the patient, reviewing the data in the system, coordination of the care with the nursing staff as well as consultants, documentation, and entering orders.         Electronically signed by AMBAR Horton, 08/14/21, 11:17 AM EDT.

## 2021-08-14 NOTE — PROGRESS NOTES
"   LOS: 3 days    Patient Care Team:  Carin Dorman PA as PCP - General (Internal Medicine)  Annette Lau DO as Consulting Physician (Endocrinology)  Madison Marcus, STEPHANIE as Ambulatory  (Population Health)    Reason For Visit:  F/U AMBROCIO  Subjective           Review of Systems:    Pulm: No soa   CV:  No CP      Objective     insulin lispro, 0-7 Units, Subcutaneous, TID AC      sodium chloride, 125 mL/hr, Last Rate: 125 mL/hr (08/14/21 0356)          Vital Signs:  Blood pressure 147/92, pulse 83, temperature 98 °F (36.7 °C), temperature source Oral, resp. rate 16, height 182.9 cm (72.01\"), weight 88 kg (194 lb), SpO2 95 %.    Flowsheet Rows      First Filed Value   Admission Height  182.9 cm (72\") Documented at 08/11/2021 1302   Admission Weight  88 kg (194 lb) Documented at 08/11/2021 1302          08/13 0701 - 08/14 0700  In: 1220 [P.O.:1220]  Out: 3650 [Urine:3650]    Physical Exam:    General Appearance: NAD, alert and cooperative, Ox3  Eyes: PER, conjunctivae and sclerae normal, no icterus  Lungs: respirations regular and unlabored, no crepitus, clear to auscultation  Heart/CV: regular rhythm & normal rate, no murmur, no gallop, no rub and no edema  Abdomen: not distended, soft, non-tender, no masses,  bowel sounds present  Skin: No rash, Warm and dry    Radiology:            Labs:  Results from last 7 days   Lab Units 08/14/21  0340 08/12/21  0646 08/11/21  1318   WBC 10*3/mm3 7.13 7.28 10.89*   HEMOGLOBIN g/dL 11.7* 13.2 15.2   HEMATOCRIT % 34.2* 39.6 45.3   PLATELETS 10*3/mm3 182 183 271     Results from last 7 days   Lab Units 08/14/21  0340 08/13/21  0649 08/12/21  0646 08/11/21  2243 08/11/21  1318 08/11/21  1318   SODIUM mmol/L 141 143 138 138   < > 135*   POTASSIUM mmol/L 3.9 4.3 4.2 4.4   < > 4.9   CHLORIDE mmol/L 112* 113* 108* 103   < > 97*   CO2 mmol/L 18.0* 18.0* 17.0* 18.0*   < > 20.0*   BUN mg/dL 40* 50* 61* 63*   < > 67*   CREATININE mg/dL 3.69* 4.78* 6.23* 6.69*   < > 8.10* "   CALCIUM mg/dL 8.7 8.8 8.8 9.2   < > 10.3   PHOSPHORUS mg/dL 3.7 3.5  --   --   --   --    ALBUMIN g/dL 3.20* 3.30* 3.60  --   --  4.60    < > = values in this interval not displayed.     Results from last 7 days   Lab Units 08/14/21  0340   GLUCOSE mg/dL 131*       Results from last 7 days   Lab Units 08/12/21  0646   ALK PHOS U/L 79   BILIRUBIN mg/dL 0.3   ALT (SGPT) U/L 6   AST (SGOT) U/L 11           Results from last 7 days   Lab Units 08/11/21  1747   COLOR UA  Yellow   CLARITY UA  Clear   PH, URINE  <=5.0   SPECIFIC GRAVITY, URINE  1.015   GLUCOSE UA  100 mg/dL (Trace)*   KETONES UA  Trace*   BILIRUBIN UA  Negative   PROTEIN UA  100 mg/dL (2+)*   BLOOD UA  Trace*   LEUKOCYTES UA  Negative   NITRITE UA  Negative       Estimated Creatinine Clearance: 25.8 mL/min (A) (by C-G formula based on SCr of 3.69 mg/dL (H)).      Assessment       Acute renal failure (CMS/HCC)    Type 2 diabetes mellitus with hyperglycemia, without long-term current use of insulin (CMS/HCC)    Mixed hyperlipidemia    Elevated lipase    Leukocytosis            Impression: AMBROCIO WITH VOLUME DEPLETION AND NSAIA USE. GFR RAPIDLY IMPROVING. ANEMIA. DM.             Recommendations: DISCHARGE HOME OK WITH RENAL OFF NSAIA'S AND SEMAGULTIDE. F/U WITH [facility] 9/13/21 ARRANGED.      Ricardo Szymanski MD  08/14/21  09:15 EDT

## 2021-08-14 NOTE — OUTREACH NOTE
Prep Survey      Responses   [facility] facility patient discharged from?  [place]   Is LACE score < 7 ?  No   Emergency Room discharge w/ pulse ox?  No   Eligibility  [facility]   Date of Admission  08/11/21   Date of Discharge  08/14/21   Discharge Disposition  Home or Self Care   Discharge diagnosis  Acute renal failure    Does the patient have one of the following disease processes/diagnoses(primary or secondary)?  Other   Does the patient have Home health ordered?  No   Is there a DME ordered?  No   Prep survey completed?  Yes          Afua Guevara RN

## 2021-08-14 NOTE — PLAN OF CARE
Goal Outcome Evaluation:              Outcome Summary: patient is alert, roomair, up ad bert, educate on discharge instructions

## 2021-08-16 ENCOUNTER — TRANSITIONAL CARE MANAGEMENT TELEPHONE ENCOUNTER (OUTPATIENT)
Dept: CALL CENTER | Facility: HOSPITAL | Age: 61
End: 2021-08-16

## 2021-08-16 NOTE — OUTREACH NOTE
Call Center TCM Note      Responses   [facility] patient discharged from?  [facility]   Does the patient have one of the following disease processes/diagnoses(primary or secondary)?  Other   TCM attempt successful?  Yes   Call start time  1220   Call end time  1226   Discharge diagnosis  Acute renal failure, DM type 2   Is patient permission given to speak with other caregiver?  Yes   List who call center can speak with  Darby Hawk, spouse   Person spoke with today (if not patient) and relationship  Darby, spouse   Medication alerts for this patient  Stop Rybelsus   Meds reviewed with patient/caregiver?  Yes   Does the patient have all medications ordered at discharge?  Yes   Is the patient taking all medications as directed (includes completed medication regime)?  Yes   Comments regarding appointments  Patient to also follow up with nephrology. Endocrinology appt 11/8/21   Does the patient have a primary care provider?   Yes   Does the patient have an appointment with their PCP within 7 days of discharge?  Yes   Comments regarding PCP  Follow up with KELIN Betts. Hospital follow up scheduled for Thurs 9am. Needs BMP check.    Has the patient kept scheduled appointments due by today?  N/A   Has home health visited the patient within 72 hours of discharge?  N/A   Psychosocial issues?  No   Comments  Patient monitoring home blood sugar. Wife unable to give readings today.    Did the patient receive a copy of their discharge instructions?  Yes   Nursing interventions  Reviewed instructions with patient [wife]   What is the patient's perception of their health status since discharge?  Improving   Is the patient/caregiver able to teach back signs and symptoms related to disease process for when to call PCP?  Yes   Is the patient/caregiver able to teach back the hierarchy of who to call/visit for symptoms/problems? PCP, Specialist, Home health nurse, Urgent Care, ED, 911  Yes   If the patient is a current  smoker, are they able to teach back resources for cessation?  Not a smoker   TCM call completed?  Yes   Wrap up additional comments  Denies further questions or needs today.           Afua Lucas RN    8/16/2021, 12:26 EDT

## 2021-08-19 ENCOUNTER — LAB (OUTPATIENT)
Dept: LAB | Facility: HOSPITAL | Age: 61
End: 2021-08-19

## 2021-08-19 ENCOUNTER — OFFICE VISIT (OUTPATIENT)
Dept: INTERNAL MEDICINE | Facility: CLINIC | Age: 61
End: 2021-08-19

## 2021-08-19 VITALS
BODY MASS INDEX: 26.36 KG/M2 | SYSTOLIC BLOOD PRESSURE: 102 MMHG | OXYGEN SATURATION: 98 % | DIASTOLIC BLOOD PRESSURE: 68 MMHG | HEART RATE: 75 BPM | WEIGHT: 194.4 LBS

## 2021-08-19 DIAGNOSIS — R74.8 ELEVATED LIPASE: ICD-10-CM

## 2021-08-19 DIAGNOSIS — N17.0 ACUTE RENAL FAILURE WITH TUBULAR NECROSIS (HCC): Primary | ICD-10-CM

## 2021-08-19 DIAGNOSIS — N17.0 ACUTE RENAL FAILURE WITH TUBULAR NECROSIS (HCC): ICD-10-CM

## 2021-08-19 DIAGNOSIS — E11.65 TYPE 2 DIABETES MELLITUS WITH HYPERGLYCEMIA, WITHOUT LONG-TERM CURRENT USE OF INSULIN (HCC): ICD-10-CM

## 2021-08-19 LAB
ALBUMIN SERPL-MCNC: 4.6 G/DL (ref 3.5–5.2)
ALBUMIN/GLOB SERPL: 1.4 G/DL
ALP SERPL-CCNC: 85 U/L (ref 39–117)
ALT SERPL W P-5'-P-CCNC: 10 U/L (ref 1–41)
ANION GAP SERPL CALCULATED.3IONS-SCNC: 16.9 MMOL/L (ref 5–15)
AST SERPL-CCNC: 14 U/L (ref 1–40)
BILIRUB SERPL-MCNC: 0.4 MG/DL (ref 0–1.2)
BUN SERPL-MCNC: 12 MG/DL (ref 8–23)
BUN/CREAT SERPL: 6.6 (ref 7–25)
CALCIUM SPEC-SCNC: 9.4 MG/DL (ref 8.6–10.5)
CHLORIDE SERPL-SCNC: 105 MMOL/L (ref 98–107)
CO2 SERPL-SCNC: 22.1 MMOL/L (ref 22–29)
CREAT SERPL-MCNC: 1.83 MG/DL (ref 0.76–1.27)
GFR SERPL CREATININE-BSD FRML MDRD: 38 ML/MIN/1.73
GLOBULIN UR ELPH-MCNC: 3.2 GM/DL
GLUCOSE SERPL-MCNC: 217 MG/DL (ref 65–99)
LIPASE SERPL-CCNC: 145 U/L (ref 13–60)
POTASSIUM SERPL-SCNC: 4 MMOL/L (ref 3.5–5.2)
PROT SERPL-MCNC: 7.8 G/DL (ref 6–8.5)
SODIUM SERPL-SCNC: 144 MMOL/L (ref 136–145)

## 2021-08-19 PROCEDURE — 83690 ASSAY OF LIPASE: CPT

## 2021-08-19 PROCEDURE — 36415 COLL VENOUS BLD VENIPUNCTURE: CPT

## 2021-08-19 PROCEDURE — 80053 COMPREHEN METABOLIC PANEL: CPT

## 2021-08-19 PROCEDURE — 99495 TRANSJ CARE MGMT MOD F2F 14D: CPT | Performed by: PHYSICIAN ASSISTANT

## 2021-08-19 NOTE — ASSESSMENT & PLAN NOTE
Blood sugars stable at home.  Pt will continue to monitor closely. Follow up with Endocrinology as scheduled.

## 2021-08-20 NOTE — PROGRESS NOTES
Your renal function and lipase show improvement. Please continue to maintain adequate hydration and keep your Nephrology follow up. Keep a close eye on your blood sugars, your glucose was 217 today.

## 2021-10-22 DIAGNOSIS — E11.65 TYPE 2 DIABETES MELLITUS WITH HYPERGLYCEMIA, WITHOUT LONG-TERM CURRENT USE OF INSULIN (HCC): ICD-10-CM

## 2021-10-22 RX ORDER — PIOGLITAZONEHYDROCHLORIDE 30 MG/1
30 TABLET ORAL DAILY
Qty: 90 TABLET | Refills: 0 | Status: SHIPPED | OUTPATIENT
Start: 2021-10-22 | End: 2021-11-10

## 2021-10-22 RX ORDER — METFORMIN HYDROCHLORIDE 500 MG/1
TABLET, EXTENDED RELEASE ORAL
Qty: 360 TABLET | Refills: 1 | Status: SHIPPED | OUTPATIENT
Start: 2021-10-22 | End: 2021-11-04 | Stop reason: SDUPTHER

## 2021-11-04 RX ORDER — METFORMIN HYDROCHLORIDE 500 MG/1
1000 TABLET, EXTENDED RELEASE ORAL 2 TIMES DAILY
Qty: 360 TABLET | Refills: 1 | Status: SHIPPED | OUTPATIENT
Start: 2021-11-04 | End: 2021-11-05 | Stop reason: SDUPTHER

## 2021-11-05 RX ORDER — METFORMIN HYDROCHLORIDE 500 MG/1
1000 TABLET, EXTENDED RELEASE ORAL 2 TIMES DAILY
Qty: 360 TABLET | Refills: 1 | Status: SHIPPED | OUTPATIENT
Start: 2021-11-05 | End: 2022-04-27 | Stop reason: SDUPTHER

## 2021-11-05 NOTE — TELEPHONE ENCOUNTER
SENT PRESCRIPTION TO WRONG PHARMACY NEEDS TO GO TO [facility] NOT [facility]

## 2021-11-10 ENCOUNTER — LAB (OUTPATIENT)
Dept: LAB | Facility: HOSPITAL | Age: 61
End: 2021-11-10

## 2021-11-10 ENCOUNTER — OFFICE VISIT (OUTPATIENT)
Dept: ENDOCRINOLOGY | Facility: CLINIC | Age: 61
End: 2021-11-10

## 2021-11-10 VITALS
SYSTOLIC BLOOD PRESSURE: 146 MMHG | OXYGEN SATURATION: 97 % | DIASTOLIC BLOOD PRESSURE: 86 MMHG | HEIGHT: 71 IN | BODY MASS INDEX: 28.14 KG/M2 | HEART RATE: 92 BPM | WEIGHT: 201 LBS

## 2021-11-10 DIAGNOSIS — E11.65 TYPE 2 DIABETES MELLITUS WITH HYPERGLYCEMIA, WITHOUT LONG-TERM CURRENT USE OF INSULIN (HCC): Primary | ICD-10-CM

## 2021-11-10 DIAGNOSIS — E78.2 MIXED HYPERLIPIDEMIA: ICD-10-CM

## 2021-11-10 DIAGNOSIS — N17.9 AKI (ACUTE KIDNEY INJURY) (HCC): ICD-10-CM

## 2021-11-10 LAB
ALBUMIN SERPL-MCNC: 4.5 G/DL (ref 3.5–5.2)
ALBUMIN/GLOB SERPL: 1.6 G/DL
ALP SERPL-CCNC: 112 U/L (ref 39–117)
ALT SERPL W P-5'-P-CCNC: 12 U/L (ref 1–41)
ANION GAP SERPL CALCULATED.3IONS-SCNC: 9.5 MMOL/L (ref 5–15)
AST SERPL-CCNC: 18 U/L (ref 1–40)
BILIRUB SERPL-MCNC: 0.6 MG/DL (ref 0–1.2)
BUN SERPL-MCNC: 13 MG/DL (ref 8–23)
BUN/CREAT SERPL: 11.7 (ref 7–25)
CALCIUM SPEC-SCNC: 9.6 MG/DL (ref 8.6–10.5)
CHLORIDE SERPL-SCNC: 105 MMOL/L (ref 98–107)
CO2 SERPL-SCNC: 26.5 MMOL/L (ref 22–29)
CREAT SERPL-MCNC: 1.11 MG/DL (ref 0.76–1.27)
EXPIRATION DATE: ABNORMAL
EXPIRATION DATE: NORMAL
GFR SERPL CREATININE-BSD FRML MDRD: 67 ML/MIN/1.73
GLOBULIN UR ELPH-MCNC: 2.9 GM/DL
GLUCOSE BLDC GLUCOMTR-MCNC: 140 MG/DL (ref 70–130)
GLUCOSE SERPL-MCNC: 125 MG/DL (ref 65–99)
HBA1C MFR BLD: 7.7 %
Lab: ABNORMAL
Lab: NORMAL
POTASSIUM SERPL-SCNC: 4.6 MMOL/L (ref 3.5–5.2)
PROT SERPL-MCNC: 7.4 G/DL (ref 6–8.5)
SODIUM SERPL-SCNC: 141 MMOL/L (ref 136–145)

## 2021-11-10 PROCEDURE — 83036 HEMOGLOBIN GLYCOSYLATED A1C: CPT | Performed by: INTERNAL MEDICINE

## 2021-11-10 PROCEDURE — 80053 COMPREHEN METABOLIC PANEL: CPT | Performed by: INTERNAL MEDICINE

## 2021-11-10 PROCEDURE — 99214 OFFICE O/P EST MOD 30 MIN: CPT | Performed by: INTERNAL MEDICINE

## 2021-11-10 PROCEDURE — 82043 UR ALBUMIN QUANTITATIVE: CPT | Performed by: INTERNAL MEDICINE

## 2021-11-10 PROCEDURE — 82947 ASSAY GLUCOSE BLOOD QUANT: CPT | Performed by: INTERNAL MEDICINE

## 2021-11-10 PROCEDURE — 82570 ASSAY OF URINE CREATININE: CPT | Performed by: INTERNAL MEDICINE

## 2021-11-10 RX ORDER — GLIPIZIDE 10 MG/1
10 TABLET, FILM COATED, EXTENDED RELEASE ORAL DAILY
COMMUNITY
End: 2021-12-27 | Stop reason: SDUPTHER

## 2021-11-10 NOTE — PROGRESS NOTES
"Chief Complaint   Patient presents with   • Diabetes          HPI   Ceasar Hawk is a 60 y.o. male had concerns including Diabetes.    He is checking blood sugars 1-2 times per day. BGs are mostly 120s-140s.   A1c improved to 7.7.  Has mostly cut out soda.   Current medications for diabetes include metformin 1000 mg BID and glipizide 10 mg daily.  After the dose increase in rybelsus he developed nausea and vomiting for a few days. No diarrhea. He stopped the rybelsus at the onset of symptoms, but by the time he was evaluated his creatinine had increased significantly to a max of 8.1. Creatinine has steadily trended down since. During and after the hospitalization the Actos was also discontinued. Metformin and glipizide were continued.    He has daily myalgia - improves after he starts moving around. Unsure if this is related to rosuvastatin.    The following portions of the patient's history were reviewed and updated as appropriate: allergies, current medications, past family history, past medical history, past social history, past surgical history and problem list.      Review of Systems   Musculoskeletal: Positive for myalgias.        Physical Exam  Vitals reviewed.   Constitutional:       Appearance: Normal appearance.   Cardiovascular:      Rate and Rhythm: Normal rate.   Pulmonary:      Effort: Pulmonary effort is normal.   Neurological:      General: No focal deficit present.      Mental Status: He is alert. Mental status is at baseline.   Psychiatric:         Mood and Affect: Mood normal.         Behavior: Behavior normal.        /86 (BP Location: Right arm, Patient Position: Sitting, Cuff Size: Adult)   Pulse 92   Ht 180.3 cm (71\")   Wt 91.2 kg (201 lb)   SpO2 97%   BMI 28.03 kg/m²      Labs and imaging    CMP:  Lab Results   Component Value Date    BUN 12 08/19/2021    CREATININE 1.83 (H) 08/19/2021    EGFRIFNONA 38 (L) 08/19/2021    EGFRIFAFRI  08/13/2021      Comment:      <15 Indicative of " kidney failure.    BCR 6.6 (L) 08/19/2021     08/19/2021    K 4.0 08/19/2021    CO2 22.1 08/19/2021    CALCIUM 9.4 08/19/2021    ALBUMIN 4.60 08/19/2021    BILITOT 0.4 08/19/2021    ALKPHOS 85 08/19/2021    AST 14 08/19/2021    ALT 10 08/19/2021     Lipid Panel:  Lab Results   Component Value Date    CHOL 142 03/11/2021    TRIG 185 (H) 03/11/2021    HDL 46 03/11/2021    VLDL 31 03/11/2021    LDL 65 03/11/2021     HbA1c:  Lab Results   Component Value Date    HGBA1C 7.7 11/10/2021    HGBA1C 8.00 (H) 08/12/2021     Glucose:    Lab Results   Component Value Date    POCGLU 140 (A) 11/10/2021     Microalbumin:  Lab Results   Component Value Date    MALBCRERATIO 64.9 09/28/2020     TSH:  Lab Results   Component Value Date    TSH 1.370 03/11/2021       Assessment and plan  Diagnoses and all orders for this visit:    1. Type 2 diabetes mellitus with hyperglycemia, without long-term current use of insulin (HCC) (Primary)  Uncontrolled but improved with A1c down to 7.7. Still with occasional hyperglycemia. Complicated by CKD and microalbuminuria.  History of in GI intolerance to Rybelsus after dose was increased to 7 mg daily. Will not resume due to severity of AMBROCIO.  Recheck CMP today. Metformin may need to be adjusted pending GFR. Continue glipizide 10 mg daily. Patient was on Actos in the past but this also was discontinued.  Recommended he completely eliminate regular soda (only drinking half to 1/day.  Other labs are up-to-date with a lipid from March. Check urine microalbumin and CMP today. Ophtho exam is due and patient was encouraged to schedule. Monofilament up-to-date from April 2021.  -     POC Glycosylated Hemoglobin (Hb A1C)  -     POC Glucose, Blood  -     Microalbumin / Creatinine Urine Ratio - Urine, Clean Catch    2. AMBROCIO (acute kidney injury) (HCC)  Patient developed AMBROCIO after nausea and vomiting related to an increase in the dose of Rybelsus. Level of creatinine increase seems out of proportion to a  couple of days of nausea and vomiting. Patient had no diarrhea.  Due to the severity of side effects, we will not resume GLP-1 agonists at this time.   Recheck CMP today.  Pending labs, metformin may need to be reduced/discontinued.  -     Comprehensive Metabolic Panel    3. Mixed hyperlipidemia  Patient complaining of daily myalgias. We will do a trial off of rosuvastatin to see if symptoms improve. If symptoms are unchanged, resume rosuvastatin. If improved, consider dose reduction or transition to alternate statin.       Return in about 3 months (around 2/10/2022) for next scheduled follow up. The patient was instructed to contact the clinic with any interval questions or concerns.    Annette Lau, DO   Endocrinologist    Please note that portions of this note were completed with a voice recognition program.

## 2021-11-11 LAB
ALBUMIN UR-MCNC: 6.5 MG/DL
CREAT UR-MCNC: 191.5 MG/DL
MICROALBUMIN/CREAT UR: 33.9 MG/G

## 2021-12-20 ENCOUNTER — TELEPHONE (OUTPATIENT)
Dept: INTERNAL MEDICINE | Facility: CLINIC | Age: 61
End: 2021-12-20

## 2021-12-20 DIAGNOSIS — S49.91XA INJURY OF RIGHT SHOULDER, INITIAL ENCOUNTER: ICD-10-CM

## 2021-12-20 NOTE — TELEPHONE ENCOUNTER
Caller: Ceasar Hawk    Relationship: Self    Best call back number: [phone]    What medication are you requesting: MUSCLE RELAXER    What are your current symptoms: PULLED MUSCLE IN RIGHT SHOULDER     How long have you been experiencing symptoms: 1 WEEK     Have you had these symptoms before:    [x] Yes  [] No    Have you been treated for these symptoms before:   [x] Yes  [] No    If a prescription is needed, what is your preferred pharmacy and phone number: [facility]     Additional notes:PATIENT HAS TRIED HEATING PAD AND BENADRYL WITH NO IMPROVEMENTS

## 2021-12-21 RX ORDER — CYCLOBENZAPRINE HCL 5 MG
5 TABLET ORAL 3 TIMES DAILY PRN
Qty: 30 TABLET | Refills: 0 | Status: SHIPPED | OUTPATIENT
Start: 2021-12-21 | End: 2021-12-27 | Stop reason: SDUPTHER

## 2021-12-21 NOTE — TELEPHONE ENCOUNTER
Sent in refill of muscle relaxer pt has used in the past. If not improving, he needs to come in to be seen.

## 2021-12-21 NOTE — TELEPHONE ENCOUNTER
Left message for patient that a muscle relaxer has been sent in but if no improvement will need to be seen

## 2021-12-27 ENCOUNTER — OFFICE VISIT (OUTPATIENT)
Dept: INTERNAL MEDICINE | Facility: CLINIC | Age: 61
End: 2021-12-27

## 2021-12-27 ENCOUNTER — HOSPITAL ENCOUNTER (OUTPATIENT)
Dept: GENERAL RADIOLOGY | Facility: HOSPITAL | Age: 61
Discharge: HOME | End: 2021-12-27

## 2021-12-27 VITALS
HEIGHT: 71 IN | WEIGHT: 197 LBS | HEART RATE: 111 BPM | DIASTOLIC BLOOD PRESSURE: 80 MMHG | SYSTOLIC BLOOD PRESSURE: 122 MMHG | OXYGEN SATURATION: 97 % | BODY MASS INDEX: 27.58 KG/M2 | RESPIRATION RATE: 16 BRPM | TEMPERATURE: 98.3 F

## 2021-12-27 DIAGNOSIS — M25.511 ACUTE PAIN OF RIGHT SHOULDER: ICD-10-CM

## 2021-12-27 DIAGNOSIS — M54.12 CERVICAL RADICULOPATHY: ICD-10-CM

## 2021-12-27 DIAGNOSIS — M54.12 CERVICAL RADICULOPATHY: Primary | ICD-10-CM

## 2021-12-27 PROCEDURE — 72050 X-RAY EXAM NECK SPINE 4/5VWS: CPT

## 2021-12-27 PROCEDURE — 99214 OFFICE O/P EST MOD 30 MIN: CPT

## 2021-12-27 PROCEDURE — 73030 X-RAY EXAM OF SHOULDER: CPT

## 2021-12-27 RX ORDER — CYCLOBENZAPRINE HCL 5 MG
5 TABLET ORAL 3 TIMES DAILY PRN
Qty: 30 TABLET | Refills: 0 | Status: SHIPPED | OUTPATIENT
Start: 2021-12-27 | End: 2022-03-14

## 2021-12-27 RX ORDER — METHYLPREDNISOLONE 4 MG/1
TABLET ORAL
Qty: 21 TABLET | Refills: 0 | Status: SHIPPED | OUTPATIENT
Start: 2021-12-27 | End: 2022-01-02

## 2021-12-27 NOTE — PROGRESS NOTES
Chief Complaint  Muscle Pain (Pt states hurts in right shoulder, arm and neck on right side, x 2 weeks ago. Hurts when looking up in muscles) and Shoulder Pain    Ceasar Hawk presents to [facility]    HPI: Reports having pain in the back of the neck, right shoulder, and back of the arm x 2 weeks. No mechanism of injury reported. Pain is described as achy and is experiencing burning and numbness that radiates into the finger tips. Has tried taking flexeril with some improvement in symptoms. Pain is worsened with neck extension. Pain is rated as a 5 out of 10. No change in bowel or bladder function. No reports of urinary retention.     Subjective       PMSFH  The following portions of the patient's history were reviewed and updated as appropriate: allergies, current medications, past family history, past medical history, past social history, past surgical history and problem list.     Past Medical History:   Diagnosis Date   • Hyperlipidemia    • Type 2 diabetes mellitus (HCC)       Allergies   Allergen Reactions   • Semaglutide Nausea And Vomiting      Social History     Tobacco Use   • Smoking status: Never Smoker   • Smokeless tobacco: Never Used   Vaping Use   • Vaping Use: Never used   Substance Use Topics   • Alcohol use: No   • Drug use: No     History reviewed. No pertinent surgical history.   Family History   Problem Relation Age of Onset   • No Known Problems Mother    • Heart attack Father    • No Known Problems Sister    • No Known Problems Brother          Current Outpatient Medications:   •  cyclobenzaprine (FLEXERIL) 5 MG tablet, Take 1 tablet by mouth 3 (Three) Times a Day As Needed for Muscle Spasms., Disp: 30 tablet, Rfl: 0  •  glipizide (GLUCOTROL XL) 10 MG 24 hr tablet, Take 1 tablet by mouth Daily., Disp: 90 tablet, Rfl: 1  •  metFORMIN ER (GLUCOPHAGE-XR) 500 MG 24 hr tablet, Take 2 tablets by mouth 2 (Two) Times a Day., Disp: 360 tablet, Rfl: 1  •   "rosuvastatin (CRESTOR) 20 MG tablet, Take 1 tablet by mouth Daily., Disp: 90 tablet, Rfl: 3  •  methylPREDNISolone (MEDROL) 4 MG dose pack, Take as directed on package instructions., Disp: 21 tablet, Rfl: 0  •  minocycline (MINOCIN,DYNACIN) 50 MG capsule, Take 1 capsule by mouth 2 (Two) Times a Day with food, Disp: 60 capsule, Rfl: 4    Review of Systems   Constitutional: Negative for activity change, appetite change, chills, fatigue and fever.   Respiratory: Negative for apnea, cough, choking, chest tightness, shortness of breath, wheezing and stridor.    Cardiovascular: Negative for chest pain, palpitations and leg swelling.   Endocrine: Negative for cold intolerance and heat intolerance.   Musculoskeletal: Positive for arthralgias, back pain, myalgias, neck pain and neck stiffness. Negative for gait problem, joint swelling and bursitis.   Skin: Negative for color change, dry skin, pallor, rash, skin lesions and bruise.   Neurological: Positive for numbness. Negative for dizziness, tremors, seizures, syncope, facial asymmetry, speech difficulty, weakness, light-headedness, headache, memory problem and confusion.   Hematological: Negative for adenopathy. Does not bruise/bleed easily.   Psychiatric/Behavioral: Negative for agitation, behavioral problems, decreased concentration, dysphoric mood, hallucinations, self-injury, sleep disturbance, suicidal ideas, negative for hyperactivity, depressed mood and stress. The patient is not nervous/anxious.        Objective   Vital Signs  /80   Pulse 111   Temp 98.3 °F (36.8 °C)   Resp 16   Ht 180.3 cm (71\")   Wt 89.4 kg (197 lb)   SpO2 97%   BMI 27.48 kg/m²     Physical Exam  Constitutional:       Appearance: Normal appearance.   HENT:      Head: Normocephalic.      Nose: Nose normal.   Eyes:      Conjunctiva/sclera: Conjunctivae normal.      Pupils: Pupils are equal, round, and reactive to light.   Cardiovascular:      Rate and Rhythm: Regular rhythm. " Tachycardia present.      Pulses: Normal pulses.      Heart sounds: Normal heart sounds.   Pulmonary:      Effort: Pulmonary effort is normal.      Breath sounds: Normal breath sounds.   Abdominal:      General: Bowel sounds are normal.      Palpations: Abdomen is soft.   Musculoskeletal:      Right shoulder: Normal.      Left shoulder: Normal.      Cervical back: Spasms, tenderness, bony tenderness and crepitus present. No swelling, edema, deformity, erythema, signs of trauma, lacerations, rigidity or torticollis. Pain with movement present. Decreased range of motion.      Thoracic back: Normal.      Lumbar back: Normal.      Comments:     - empty can    - lift-off    - arm drop    Skin:     General: Skin is warm and dry.      Capillary Refill: Capillary refill takes less than 2 seconds.   Neurological:      Mental Status: He is alert and oriented to person, place, and time.   Psychiatric:         Mood and Affect: Mood normal.         Behavior: Behavior normal.         Thought Content: Thought content normal.         Judgment: Judgment normal.          Result Review :     The following data was reviewed by: AMBAR Prince on 12/27/2021:  CMP    CMP 8/14/21 8/19/21 11/10/21   Glucose 131 (A) 217 (A) 125 (A)   BUN 40 (A) 12 13   Creatinine 3.69 (A) 1.83 (A) 1.11   eGFR Non  Am 17 (A) 38 (A) 67   Sodium 141 144 141   Potassium 3.9 4.0 4.6   Chloride 112 (A) 105 105   Calcium 8.7 9.4 9.6   Albumin 3.20 (A) 4.60 4.50   Total Bilirubin  0.4 0.6   Alkaline Phosphatase  85 112   AST (SGOT)  14 18   ALT (SGPT)  10 12   (A) Abnormal value                Assessment and Plan    1. Cervical radiculopathy  - XR Spine Cervical Complete 4 or 5 View; Future  - methylPREDNISolone (MEDROL) 4 MG dose pack; Take as directed on package instructions.  Dispense: 21 tablet; Refill: 0  - Ambulatory Referral to Physical Therapy  - cyclobenzaprine (FLEXERIL) 5 MG tablet; Take 1 tablet by mouth 3 (Three) Times a Day As Needed for  Muscle Spasms.  Dispense: 30 tablet; Refill: 0  - Symptoms are suggestive of cervical radiculopathy r/t potential disc herniation. X-ray of the cervical spine revealed multilevel spondylosis changes evident with disc osteophyte complex formation and facet arthropathy, most notable at C5-6. Will treat conservatively with a course of oral steroids and PT. Would consider MRI if no improvement in symptoms. Encouraged patient to follow-up with PCP if no improvement or worsening of symptoms.     2. Acute pain of right shoulder  - XR Shoulder 2+ View Right; Future  - cyclobenzaprine (FLEXERIL) 5 MG tablet; Take 1 tablet by mouth 3 (Three) Times a Day As Needed for Muscle Spasms.  Dispense: 30 tablet; Refill: 0      Follow Up     Return for Next scheduled follow up.    Patient was given instructions and counseling regarding his condition or for health maintenance advice. Please see specific information pulled into the AVS if appropriate.    Part of this note may be an electronic transcription/translation of spoken language to printed text using the Dragon Dictation System.    Electronically signed by:   AMBAR Prince  12/27/2021

## 2022-03-14 ENCOUNTER — LAB (OUTPATIENT)
Dept: LAB | Facility: HOSPITAL | Age: 62
End: 2022-03-14

## 2022-03-14 ENCOUNTER — OFFICE VISIT (OUTPATIENT)
Dept: INTERNAL MEDICINE | Facility: CLINIC | Age: 62
End: 2022-03-14

## 2022-03-14 VITALS
DIASTOLIC BLOOD PRESSURE: 78 MMHG | SYSTOLIC BLOOD PRESSURE: 108 MMHG | WEIGHT: 200.6 LBS | HEIGHT: 72 IN | OXYGEN SATURATION: 98 % | BODY MASS INDEX: 27.17 KG/M2 | HEART RATE: 78 BPM

## 2022-03-14 DIAGNOSIS — Z23 NEED FOR VACCINATION: ICD-10-CM

## 2022-03-14 DIAGNOSIS — Z00.00 HEALTH CARE MAINTENANCE: Primary | ICD-10-CM

## 2022-03-14 DIAGNOSIS — Z12.5 PROSTATE CANCER SCREENING: ICD-10-CM

## 2022-03-14 DIAGNOSIS — Z00.00 HEALTH CARE MAINTENANCE: ICD-10-CM

## 2022-03-14 DIAGNOSIS — Z23 NEED FOR INFLUENZA VACCINATION: ICD-10-CM

## 2022-03-14 LAB
ALBUMIN SERPL-MCNC: 4.6 G/DL (ref 3.5–5.2)
ALBUMIN/GLOB SERPL: 1.7 G/DL
ALP SERPL-CCNC: 98 U/L (ref 39–117)
ALT SERPL W P-5'-P-CCNC: 27 U/L (ref 1–41)
ANION GAP SERPL CALCULATED.3IONS-SCNC: 13.4 MMOL/L (ref 5–15)
AST SERPL-CCNC: 24 U/L (ref 1–40)
BASOPHILS # BLD AUTO: 0.08 10*3/MM3 (ref 0–0.2)
BASOPHILS NFR BLD AUTO: 1 % (ref 0–1.5)
BILIRUB SERPL-MCNC: 0.5 MG/DL (ref 0–1.2)
BUN SERPL-MCNC: 13 MG/DL (ref 8–23)
BUN/CREAT SERPL: 10.8 (ref 7–25)
CALCIUM SPEC-SCNC: 9.6 MG/DL (ref 8.6–10.5)
CHLORIDE SERPL-SCNC: 100 MMOL/L (ref 98–107)
CHOLEST SERPL-MCNC: 244 MG/DL (ref 0–200)
CO2 SERPL-SCNC: 24.6 MMOL/L (ref 22–29)
CREAT SERPL-MCNC: 1.2 MG/DL (ref 0.76–1.27)
DEPRECATED RDW RBC AUTO: 45 FL (ref 37–54)
EGFRCR SERPLBLD CKD-EPI 2021: 68 ML/MIN/1.73
EOSINOPHIL # BLD AUTO: 0.2 10*3/MM3 (ref 0–0.4)
EOSINOPHIL NFR BLD AUTO: 2.5 % (ref 0.3–6.2)
ERYTHROCYTE [DISTWIDTH] IN BLOOD BY AUTOMATED COUNT: 13.2 % (ref 12.3–15.4)
GLOBULIN UR ELPH-MCNC: 2.7 GM/DL
GLUCOSE SERPL-MCNC: 183 MG/DL (ref 65–99)
HCT VFR BLD AUTO: 44.7 % (ref 37.5–51)
HDLC SERPL-MCNC: 43 MG/DL (ref 40–60)
HGB BLD-MCNC: 14.9 G/DL (ref 13–17.7)
IMM GRANULOCYTES # BLD AUTO: 0.05 10*3/MM3 (ref 0–0.05)
IMM GRANULOCYTES NFR BLD AUTO: 0.6 % (ref 0–0.5)
LDLC SERPL CALC-MCNC: 159 MG/DL (ref 0–100)
LDLC/HDLC SERPL: 3.62 {RATIO}
LYMPHOCYTES # BLD AUTO: 1.58 10*3/MM3 (ref 0.7–3.1)
LYMPHOCYTES NFR BLD AUTO: 20.1 % (ref 19.6–45.3)
MCH RBC QN AUTO: 30.7 PG (ref 26.6–33)
MCHC RBC AUTO-ENTMCNC: 33.3 G/DL (ref 31.5–35.7)
MCV RBC AUTO: 92.2 FL (ref 79–97)
MONOCYTES # BLD AUTO: 0.52 10*3/MM3 (ref 0.1–0.9)
MONOCYTES NFR BLD AUTO: 6.6 % (ref 5–12)
NEUTROPHILS NFR BLD AUTO: 5.45 10*3/MM3 (ref 1.7–7)
NEUTROPHILS NFR BLD AUTO: 69.2 % (ref 42.7–76)
NRBC BLD AUTO-RTO: 0 /100 WBC (ref 0–0.2)
PLATELET # BLD AUTO: 288 10*3/MM3 (ref 140–450)
PMV BLD AUTO: 10.5 FL (ref 6–12)
POTASSIUM SERPL-SCNC: 4.1 MMOL/L (ref 3.5–5.2)
PROT SERPL-MCNC: 7.3 G/DL (ref 6–8.5)
PSA SERPL-MCNC: 1.16 NG/ML (ref 0–4)
RBC # BLD AUTO: 4.85 10*6/MM3 (ref 4.14–5.8)
SODIUM SERPL-SCNC: 138 MMOL/L (ref 136–145)
TRIGL SERPL-MCNC: 226 MG/DL (ref 0–150)
TSH SERPL DL<=0.05 MIU/L-ACNC: 1.64 UIU/ML (ref 0.27–4.2)
VLDLC SERPL-MCNC: 42 MG/DL (ref 5–40)
WBC NRBC COR # BLD: 7.88 10*3/MM3 (ref 3.4–10.8)

## 2022-03-14 PROCEDURE — 90686 IIV4 VACC NO PRSV 0.5 ML IM: CPT | Performed by: PHYSICIAN ASSISTANT

## 2022-03-14 PROCEDURE — 80050 GENERAL HEALTH PANEL: CPT

## 2022-03-14 PROCEDURE — 90471 IMMUNIZATION ADMIN: CPT | Performed by: PHYSICIAN ASSISTANT

## 2022-03-14 PROCEDURE — G0103 PSA SCREENING: HCPCS

## 2022-03-14 PROCEDURE — 80061 LIPID PANEL: CPT

## 2022-03-14 PROCEDURE — 99396 PREV VISIT EST AGE 40-64: CPT | Performed by: PHYSICIAN ASSISTANT

## 2022-03-14 PROCEDURE — 82270 OCCULT BLOOD FECES: CPT | Performed by: PHYSICIAN ASSISTANT

## 2022-03-14 NOTE — ASSESSMENT & PLAN NOTE
Immunizations: shingrix recommended  Eye exam: done 2018, recommended  Prostate exam: done today  PSA: ordered  Colonoscopy: cologuard done 2021; repeat 2024  Labs: fasting labs ordered    Counseled patient regarding multimodal approach with healthy nutrition, healthy sleep, regular physical activity, social activities, counseling, safety measures and medications.

## 2022-03-14 NOTE — PROGRESS NOTES
Immunization  Immunization performed in left deltoid by Phoebe Horton MA. Patient tolerated the procedure well without complications.  03/14/22   Phoebe Horton MA

## 2022-03-14 NOTE — PROGRESS NOTES
"Chief Complaint   Patient presents with   • Annual Exam       Subjective     History of Present Illness     Ceasar Hawk is a 61 y.o. male.     The patient is being seen for a health maintenance evaluation.  The last health maintenance was 1 year(s) ago.    Social History  Ceasar  does not smoke cigarettes.   He drinks no alcohol.  He does not use illicit drugs.    General History  Ceasar  does have regular dental visits.  He does complain of vision problems. Wears reading glasses. Last eye exam was 2020.  Immunizations are not up to date. The patient needs the following immunizations: influenza vaccine to be done; shingrix recommended    Lifestyle  Ceasar  consumes a in general, a \"healthy\" diet  .  He exercises intermittently. Stays active.    Reproductive Health  Ceasar  is sexually active. His contraceptive plan is no method.   He does have erectile dysfunction.     Screening  Last PSA was 2021.  Last prostate exam was  2021. Family history of prostate cancer: none  Last testicular exam was 2021.   Last colonoscopy was Cologuard 2021, repeat 2024. Family history of colon cancer: none  Other pertinent family history and/or screenings: none                    Current Outpatient Medications:   •  glipizide (GLUCOTROL XL) 10 MG 24 hr tablet, Take 1 tablet by mouth Daily., Disp: 90 tablet, Rfl: 1  •  metFORMIN ER (GLUCOPHAGE-XR) 500 MG 24 hr tablet, Take 2 tablets by mouth 2 (Two) Times a Day., Disp: 360 tablet, Rfl: 1     PMFSH  The following portions of the patient's history were reviewed and updated as appropriate: allergies, current medications, past family history, past medical history, past social history, past surgical history and problem list.    Review of Systems   Constitutional: Negative for appetite change, fever and unexpected weight change.   HENT: Negative for ear pain, facial swelling and sore throat.    Eyes: Negative for pain and visual disturbance.   Respiratory: Negative for chest tightness, " "shortness of breath and wheezing.    Cardiovascular: Negative for chest pain and palpitations.   Gastrointestinal: Negative for abdominal pain and blood in stool.   Endocrine: Negative.    Genitourinary: Negative for difficulty urinating and hematuria.   Musculoskeletal: Negative for joint swelling.   Neurological: Negative for dizziness, tremors, seizures, syncope, light-headedness and headaches.   Hematological: Negative for adenopathy.   Psychiatric/Behavioral: Negative.        Objective   /78   Pulse 78   Ht 182.9 cm (72\")   Wt 91 kg (200 lb 9.6 oz)   SpO2 98%   BMI 27.21 kg/m²     Physical Exam  Vitals and nursing note reviewed.   Constitutional:       General: He is not in acute distress.     Appearance: He is well-developed. He is not diaphoretic.   HENT:      Head: Normocephalic and atraumatic. Hair is normal.      Right Ear: Hearing, tympanic membrane, ear canal and external ear normal.      Left Ear: Hearing, tympanic membrane, ear canal and external ear normal.      Nose: No nasal deformity.      Mouth/Throat:      Mouth: No oral lesions.      Pharynx: Uvula midline. No uvula swelling.   Eyes:      General: Lids are normal. No scleral icterus.        Right eye: No discharge.         Left eye: No discharge.      Extraocular Movements:      Right eye: Normal extraocular motion and no nystagmus.      Left eye: Normal extraocular motion and no nystagmus.      Conjunctiva/sclera: Conjunctivae normal.      Pupils: Pupils are equal, round, and reactive to light.   Neck:      Thyroid: No thyromegaly.      Vascular: No JVD.      Trachea: No tracheal deviation.   Cardiovascular:      Rate and Rhythm: Normal rate and regular rhythm.      Pulses: Normal pulses.      Heart sounds: Normal heart sounds. No murmur heard.    No gallop.   Pulmonary:      Effort: Pulmonary effort is normal. No respiratory distress.      Breath sounds: Normal breath sounds. No wheezing or rales.   Chest:      Chest wall: No " tenderness.   Abdominal:      General: Bowel sounds are normal. There is no distension.      Palpations: Abdomen is soft. There is no mass.      Tenderness: There is no abdominal tenderness. There is no guarding.      Hernia: No hernia is present.   Genitourinary:     Penis: Normal.       Testes: Normal.      Prostate: Normal.      Rectum: Normal. Guaiac result negative.      Comments: Declined chaperone for  exam  Musculoskeletal:         General: No tenderness or deformity. Normal range of motion.      Cervical back: Normal range of motion and neck supple.   Lymphadenopathy:      Cervical: No cervical adenopathy.   Skin:     General: Skin is warm and dry.      Findings: No rash.   Neurological:      Mental Status: He is alert and oriented to person, place, and time.      Cranial Nerves: No cranial nerve deficit.      Motor: No abnormal muscle tone.      Coordination: Coordination normal.      Deep Tendon Reflexes: Reflexes are normal and symmetric. Reflexes normal.   Psychiatric:         Behavior: Behavior normal.         Thought Content: Thought content normal.         Judgment: Judgment normal.         ASSESSMENT/PLAN    Diagnoses and all orders for this visit:    1. Health care maintenance (Primary)  Assessment & Plan:  Immunizations: shingrix recommended  Eye exam: done 2018, recommended  Prostate exam: done today  PSA: ordered  Colonoscopy: cologuard done 2021; repeat 2024  Labs: fasting labs ordered    Counseled patient regarding multimodal approach with healthy nutrition, healthy sleep, regular physical activity, social activities, counseling, safety measures and medications.       Orders:  -     CBC & Differential; Future  -     Comprehensive Metabolic Panel; Future  -     Lipid Panel; Future  -     TSH; Future    2. Prostate cancer screening  -     PSA Screen; Future    3. Need for vaccination  -     Cancel: Flu Vaccine Intradermal Quad 18-64YR    4. Need for influenza vaccination  -      FluLaval/Fluarix/Fluzone >6 Months           Return in about 1 year (around 3/14/2023) for Annual with fasting labs.

## 2022-03-25 ENCOUNTER — TELEPHONE (OUTPATIENT)
Dept: INTERNAL MEDICINE | Facility: CLINIC | Age: 62
End: 2022-03-25

## 2022-03-25 NOTE — TELEPHONE ENCOUNTER
Patient advised of results, he states he muscle aches have improved since stopping the cholesterol medication, please advise

## 2022-03-25 NOTE — TELEPHONE ENCOUNTER
Patient notified that a different statin has been sent in for him to take, if it needs a pa I will work on it, if it's too expensive he has been advised to let us know

## 2022-03-25 NOTE — TELEPHONE ENCOUNTER
----- Message from KELIN Redding sent at 3/24/2022 10:19 PM EDT -----  Please let him know that his labs showed that his cholesterol has gone up again. It looks like he stopped his cholesterol medication to see if his muscle aches improved. Has there been a difference since stopping the rosuvastatin? If not, he can restart it. If it is better, we can find an alternative medication.    The rest of his labs look fine.

## 2022-03-25 NOTE — PROGRESS NOTES
Please let him know that his labs showed that his cholesterol has gone up again. It looks like he stopped his cholesterol medication to see if his muscle aches improved. Has there been a difference since stopping the rosuvastatin? If not, he can restart it. If it is better, we can find an alternative medication.    The rest of his labs look fine.

## 2022-03-25 NOTE — TELEPHONE ENCOUNTER
I will send in a different statin, Livalo, for him to try. This one may not be covered by insurance or be too expensive. If so, let me know and will try something else.

## 2022-04-20 DIAGNOSIS — E78.2 MIXED HYPERLIPIDEMIA: ICD-10-CM

## 2022-04-20 RX ORDER — ROSUVASTATIN CALCIUM 20 MG/1
20 TABLET, COATED ORAL DAILY
Qty: 90 TABLET | Refills: 3 | OUTPATIENT
Start: 2022-04-20

## 2022-04-27 ENCOUNTER — OFFICE VISIT (OUTPATIENT)
Dept: ENDOCRINOLOGY | Facility: CLINIC | Age: 62
End: 2022-04-27

## 2022-04-27 VITALS
HEART RATE: 69 BPM | DIASTOLIC BLOOD PRESSURE: 69 MMHG | HEIGHT: 72 IN | BODY MASS INDEX: 26.41 KG/M2 | WEIGHT: 195 LBS | SYSTOLIC BLOOD PRESSURE: 116 MMHG | OXYGEN SATURATION: 97 %

## 2022-04-27 DIAGNOSIS — E78.2 MIXED HYPERLIPIDEMIA: ICD-10-CM

## 2022-04-27 DIAGNOSIS — E11.65 TYPE 2 DIABETES MELLITUS WITH HYPERGLYCEMIA, WITHOUT LONG-TERM CURRENT USE OF INSULIN: Primary | ICD-10-CM

## 2022-04-27 LAB
EXPIRATION DATE: ABNORMAL
EXPIRATION DATE: NORMAL
GLUCOSE BLDC GLUCOMTR-MCNC: 331 MG/DL (ref 70–130)
HBA1C MFR BLD: 9.2 %
Lab: ABNORMAL
Lab: NORMAL

## 2022-04-27 PROCEDURE — 83036 HEMOGLOBIN GLYCOSYLATED A1C: CPT | Performed by: INTERNAL MEDICINE

## 2022-04-27 PROCEDURE — 82947 ASSAY GLUCOSE BLOOD QUANT: CPT | Performed by: INTERNAL MEDICINE

## 2022-04-27 PROCEDURE — 99214 OFFICE O/P EST MOD 30 MIN: CPT | Performed by: INTERNAL MEDICINE

## 2022-04-27 RX ORDER — METFORMIN HYDROCHLORIDE 500 MG/1
1000 TABLET, EXTENDED RELEASE ORAL 2 TIMES DAILY
Qty: 360 TABLET | Refills: 1 | Status: SHIPPED | OUTPATIENT
Start: 2022-04-27 | End: 2022-12-13 | Stop reason: SDUPTHER

## 2022-04-27 RX ORDER — GLIPIZIDE 5 MG/1
15 TABLET, FILM COATED, EXTENDED RELEASE ORAL DAILY
Qty: 270 TABLET | Refills: 1 | Status: SHIPPED | OUTPATIENT
Start: 2022-04-27 | End: 2023-01-27 | Stop reason: SDUPTHER

## 2022-04-27 NOTE — PROGRESS NOTES
"Chief Complaint   Patient presents with   • Diabetes          HPI   Ceasar Hawk is a 61 y.o. male had concerns including Diabetes.    He is checking blood sugars every other day.  Still having regular soda - 1 per day. Was advised to stop.   Meals : meat and potatoes.     Current medications for diabetes include metformin 1000 mg BID and glipizide 10 mg daily. He ran out glipizide a week ago.   Has been rationing his metformin.  Unfortunately he has not called for prescription refill.    Glucose is up to 331 today in the office.  He just ate lunch.  Had a regular soda with this meal.      The following portions of the patient's history were reviewed and updated as appropriate: allergies, current medications, past family history, past medical history, past social history, past surgical history and problem list.      Review of Systems   Constitutional: Negative.    Endocrine: Negative.         See HPI        Physical Exam  Vitals reviewed.   Constitutional:       Appearance: Normal appearance.   Cardiovascular:      Rate and Rhythm: Normal rate.   Pulmonary:      Effort: Pulmonary effort is normal.   Neurological:      General: No focal deficit present.      Mental Status: He is alert. Mental status is at baseline.   Psychiatric:         Mood and Affect: Mood normal.         Behavior: Behavior normal.        /69   Pulse 69   Ht 182.9 cm (72\")   Wt 88.5 kg (195 lb)   SpO2 97%   BMI 26.45 kg/m²      Labs and imaging    CMP:  Lab Results   Component Value Date    BUN 13 03/14/2022    CREATININE 1.20 03/14/2022    EGFRIFNONA 67 11/10/2021    EGFRIFAFRI  08/13/2021      Comment:      <15 Indicative of kidney failure.    BCR 10.8 03/14/2022     03/14/2022    K 4.1 03/14/2022    CO2 24.6 03/14/2022    CALCIUM 9.6 03/14/2022    ALBUMIN 4.60 03/14/2022    BILITOT 0.5 03/14/2022    ALKPHOS 98 03/14/2022    AST 24 03/14/2022    ALT 27 03/14/2022     Lipid Panel:  Lab Results   Component Value Date    CHOL 244 " (H) 03/14/2022    TRIG 226 (H) 03/14/2022    HDL 43 03/14/2022    VLDL 42 (H) 03/14/2022     (H) 03/14/2022     HbA1c:  Lab Results   Component Value Date    HGBA1C 9.2 04/27/2022    HGBA1C 7.7 11/10/2021     Glucose:    Lab Results   Component Value Date    POCGLU 331 (A) 04/27/2022     Microalbumin:  Lab Results   Component Value Date    MALBCRERATIO 33.9 11/10/2021     TSH:  Lab Results   Component Value Date    TSH 1.640 03/14/2022       Assessment and plan  Diagnoses and all orders for this visit:    1. Type 2 diabetes mellitus with hyperglycemia, without long-term current use of insulin (HCC) (Primary)  Uncontrolled with significant hyperglycemia.  A1c up to 9.2.  Complicated by microalbuminuria and CKD 2.    Patient is noncompliant with glucose monitoring and taking medications.  He ran out of the glipizide and did not call for refill.  Has been rationing his metformin as he was nearing due for refill.  He must eliminate regular sweetened beverages.  Continue metformin 1000 mg twice daily.  Increase glipizide to 15 mg daily.  Dose increases may be necessary.  Call the office if BG's are persistently greater than 200 at any time.  No GLP-1 agonist due to history of significant AMBROCIO after few days of nausea/vomiting with Rybelsus.  Lipid and CMP up-to-date from March.  Urine microalbumin elevated in November.  Ophtho exam should be updated yearly.  Monofilament will be due at follow-up visit.  -     POC Glucose, Blood  -     POC Glycosylated Hemoglobin (Hb A1C)  -     glipizide (GLUCOTROL XL) 5 MG ER tablet; Take 3 tablets by mouth Daily.  Dispense: 270 tablet; Refill: 1  -     metFORMIN ER (GLUCOPHAGE-XR) 500 MG 24 hr tablet; Take 2 tablets by mouth 2 (Two) Times a Day.  Dispense: 360 tablet; Refill: 1    2. Mixed hyperlipidemia  Uncontrolled.  History of intolerance to statins.  Is currently on pitavastatin.  Monitor lipids at least yearly.       Return in about 3 months (around 7/27/2022) for next  scheduled follow up. The patient was instructed to contact the clinic with any interval questions or concerns.    Annette Lau, DO   Endocrinologist    Please note that portions of this note were completed with a voice recognition program.

## 2022-12-09 NOTE — PROGRESS NOTES
Transitional Care Follow Up Visit  Subjective     Ceasar Hawk is a 60 y.o. male who presents for a transitional care management visit.    Within 48 business hours after discharge our office contacted him via telephone to coordinate his care and needs.      I reviewed and discussed the details of that call along with the discharge summary, hospital problems, inpatient lab results, inpatient diagnostic studies, and consultation reports with Ceasar.     Current outpatient and discharge medications have been reconciled for the patient.  Reviewed by: KELIN Redding      Date of TCM Phone Call 8/14/2021   [facility]   Date of Admission 8/11/2021   Date of Discharge 8/14/2021   Discharge Disposition Home or Self Care     Risk for Readmission (LACE) Score: 9 (8/14/2021  6:01 AM)      History of Present Illness   Course During Hospital Stay:  Prior to admission to [facility] 8/11-8/14 pt developed fatigue and extreme headache so he presented to ER. Was found to be in renal failure with creatinine of 8. He was urinating normally but severely constipated. Never had BM during admission but has since had several BMs.    Continues to feel weak, appetite is not back to normal, he is maintaining fluids.     Blood sugars have been controlled since discharge. Lowest was 70, highest is 260.    Pt has appointment with Nephrology in [place] in September.           The following portions of the patient's history were reviewed and updated as appropriate: allergies, current medications, past family history, past medical history, past social history, past surgical history and problem list.    Review of Systems   Constitutional: Positive for fatigue. Negative for activity change and appetite change.   HENT: Negative for congestion and rhinorrhea.    Respiratory: Negative for chest tightness and shortness of breath.    Cardiovascular: Negative for chest pain and palpitations.   Gastrointestinal: Negative for abdominal pain.    Genitourinary: Negative for dysuria.   Musculoskeletal: Negative for arthralgias and myalgias.   Neurological: Negative for dizziness, weakness, light-headedness and headaches.   Psychiatric/Behavioral: Negative for dysphoric mood. The patient is not nervous/anxious.        Objective      /68   Pulse 75   Wt 88.2 kg (194 lb 6.4 oz)   SpO2 98%   BMI 26.36 kg/m²     Physical Exam  Constitutional:       Appearance: He is well-developed.   HENT:      Head: Normocephalic and atraumatic.      Right Ear: External ear normal.      Left Ear: External ear normal.   Eyes:      Conjunctiva/sclera: Conjunctivae normal.   Cardiovascular:      Rate and Rhythm: Normal rate and regular rhythm.   Pulmonary:      Effort: Pulmonary effort is normal.      Breath sounds: Normal breath sounds.   Musculoskeletal:         General: Normal range of motion.      Cervical back: Normal range of motion.   Skin:     General: Skin is warm and dry.   Psychiatric:         Behavior: Behavior normal.         Assessment/Plan   Diagnoses and all orders for this visit:    1. Acute renal failure with tubular necrosis (CMS/HCC) (Primary)  Assessment & Plan:  Symptomatically improved. Check CMP as ordered.     Orders:  -     Comprehensive Metabolic Panel; Future    2. Elevated lipase  Assessment & Plan:  Recheck lipase today. Further recs based on results.    Orders:  -     Lipase; Future    3. Type 2 diabetes mellitus with hyperglycemia, without long-term current use of insulin (CMS/HCC)  Assessment & Plan:  Blood sugars stable at home.  Pt will continue to monitor closely. Follow up with Endocrinology as scheduled.        Return if symptoms worsen or fail to improve, for Next scheduled follow up.            No

## 2022-12-13 DIAGNOSIS — E11.65 TYPE 2 DIABETES MELLITUS WITH HYPERGLYCEMIA, WITHOUT LONG-TERM CURRENT USE OF INSULIN: ICD-10-CM

## 2022-12-14 RX ORDER — METFORMIN HYDROCHLORIDE 500 MG/1
1000 TABLET, EXTENDED RELEASE ORAL 2 TIMES DAILY
Qty: 360 TABLET | Refills: 1 | Status: SHIPPED | OUTPATIENT
Start: 2022-12-14 | End: 2023-01-27 | Stop reason: SDUPTHER

## 2023-01-27 ENCOUNTER — OFFICE VISIT (OUTPATIENT)
Dept: ENDOCRINOLOGY | Facility: CLINIC | Age: 63
End: 2023-01-27
Payer: COMMERCIAL

## 2023-01-27 VITALS
BODY MASS INDEX: 26.82 KG/M2 | HEIGHT: 72 IN | OXYGEN SATURATION: 98 % | SYSTOLIC BLOOD PRESSURE: 102 MMHG | WEIGHT: 198 LBS | HEART RATE: 85 BPM | DIASTOLIC BLOOD PRESSURE: 64 MMHG

## 2023-01-27 DIAGNOSIS — Z91.14 NONCOMPLIANCE W/MEDICATION TREATMENT DUE TO INTERMIT USE OF MEDICATION: ICD-10-CM

## 2023-01-27 DIAGNOSIS — E11.65 TYPE 2 DIABETES MELLITUS WITH HYPERGLYCEMIA, WITHOUT LONG-TERM CURRENT USE OF INSULIN: Primary | ICD-10-CM

## 2023-01-27 DIAGNOSIS — E78.2 MIXED HYPERLIPIDEMIA: ICD-10-CM

## 2023-01-27 LAB
ALBUMIN UR-MCNC: 1.9 MG/DL
CREAT UR-MCNC: 289.7 MG/DL
EXPIRATION DATE: ABNORMAL
EXPIRATION DATE: NORMAL
GLUCOSE BLDC GLUCOMTR-MCNC: 177 MG/DL (ref 70–130)
HBA1C MFR BLD: 8.9 %
Lab: ABNORMAL
Lab: NORMAL
MICROALBUMIN/CREAT UR: 6.6 MG/G

## 2023-01-27 PROCEDURE — 83036 HEMOGLOBIN GLYCOSYLATED A1C: CPT | Performed by: INTERNAL MEDICINE

## 2023-01-27 PROCEDURE — 82947 ASSAY GLUCOSE BLOOD QUANT: CPT | Performed by: INTERNAL MEDICINE

## 2023-01-27 PROCEDURE — 82043 UR ALBUMIN QUANTITATIVE: CPT | Performed by: INTERNAL MEDICINE

## 2023-01-27 PROCEDURE — 82570 ASSAY OF URINE CREATININE: CPT | Performed by: INTERNAL MEDICINE

## 2023-01-27 PROCEDURE — 99214 OFFICE O/P EST MOD 30 MIN: CPT | Performed by: INTERNAL MEDICINE

## 2023-01-27 RX ORDER — METFORMIN HYDROCHLORIDE 500 MG/1
1000 TABLET, EXTENDED RELEASE ORAL 2 TIMES DAILY
Qty: 360 TABLET | Refills: 1 | Status: SHIPPED | OUTPATIENT
Start: 2023-01-27

## 2023-01-27 RX ORDER — GLIPIZIDE 5 MG/1
15 TABLET, FILM COATED, EXTENDED RELEASE ORAL DAILY
Qty: 270 TABLET | Refills: 1 | Status: SHIPPED | OUTPATIENT
Start: 2023-01-27

## 2023-01-27 NOTE — LETTER
January 27, 2023     KELIN Redding  [address] [facility] [address]    Patient: Ceasar Hawk   YOB: 1960   Date of Visit: 1/27/2023       Dear KELIN Redding    Ceasar Hawk was in my office today. Below is a copy of my note.    If you have questions, please do not hesitate to call me. I look forward to following Ceasar along with you.         Sincerely,        Annette Lau,         CC: No Recipients    Chief Complaint   Patient presents with   • Diabetes     Type II          HPI   Ceasar Hawk is a 62 y.o. male had concerns including Diabetes (Type II).    He is checking blood sugars infrequently.  Current medications for diabetes include metformin 1000 mg twice daily, glipizide 15 mg once daily.    Patient is missing his medication about 50% of the time.  Has been busy with his work.  Reports he simply forgets to take the medication.    The following portions of the patient's history were reviewed and updated as appropriate: allergies, current medications, past family history, past medical history, past social history, past surgical history and problem list.      Review of Systems   Constitutional: Negative.    Endocrine:        See HPI        Physical Exam  Vitals reviewed.   Constitutional:       Appearance: Normal appearance.   Cardiovascular:      Rate and Rhythm: Normal rate.      Pulses:           Dorsalis pedis pulses are 2+ on the right side and 2+ on the left side.   Pulmonary:      Effort: Pulmonary effort is normal.   Feet:      Right foot:      Skin integrity: Skin integrity normal.      Toenail Condition: Right toenails are normal.      Left foot:      Skin integrity: Skin integrity normal.      Toenail Condition: Left toenails are normal.      Comments: Diabetic Foot Exam Performed and Monofilament Test Performed    Monofilament 5/5 bilaterally  Neurological:      General: No focal deficit present.      Mental Status: He is alert. Mental status is at baseline.  "  Psychiatric:         Mood and Affect: Mood normal.         Behavior: Behavior normal.        /64 (BP Location: Left arm, Patient Position: Sitting, Cuff Size: Adult)   Pulse 85   Ht 182.9 cm (72\")   Wt 89.8 kg (198 lb)   SpO2 98%   BMI 26.85 kg/m²      Labs and imaging    CMP:  Lab Results   Component Value Date    BUN 13 03/14/2022    CREATININE 1.20 03/14/2022    EGFRIFNONA 67 11/10/2021    EGFRIFAFRI  08/13/2021      Comment:      <15 Indicative of kidney failure.    BCR 10.8 03/14/2022     03/14/2022    K 4.1 03/14/2022    CO2 24.6 03/14/2022    CALCIUM 9.6 03/14/2022    ALBUMIN 4.60 03/14/2022    BILITOT 0.5 03/14/2022    ALKPHOS 98 03/14/2022    AST 24 03/14/2022    ALT 27 03/14/2022     Lipid Panel:  Lab Results   Component Value Date    CHOL 244 (H) 03/14/2022    TRIG 226 (H) 03/14/2022    HDL 43 03/14/2022    VLDL 42 (H) 03/14/2022     (H) 03/14/2022     HbA1c:  Lab Results   Component Value Date    HGBA1C 8.9 01/27/2023    HGBA1C 9.2 04/27/2022     Glucose:    Lab Results   Component Value Date    POCGLU 177 (A) 01/27/2023     Microalbumin:  Lab Results   Component Value Date    MALBCRERATIO 33.9 11/10/2021     TSH:  Lab Results   Component Value Date    TSH 1.640 03/14/2022       Assessment and plan  Diagnoses and all orders for this visit:    1. Type 2 diabetes mellitus with hyperglycemia, without long-term current use of insulin (HCC) (Primary)  Uncontrolled with hyperglycemia.  A1c 8.9.  Complicated by CKD 2.  Patient has a long history of noncompliance, currently taking his medications 50% of the time.  Refills were sent.  Continue metformin 1000 mg twice daily, glipizide 15 mg once daily.   No GLP-1 agonist due to history of significant AMBROCIO after few days of nausea/vomiting with Rybelsus.   Was on Jardiance in the past but had tolerability issues, was in part due to hyperglycemia.  Was on Actos in the past and this was discontinued during hospitalization.  Additional " medications may not be necessary if he takes his medications consistently.  CMP and lipid up-to-date from March.  Check urine microalbumin today.  Monofilament was updated today.  Ophtho exam is up-to-date from 1/19/2023 and report is scanned into the chart.  -     POC Glucose, Blood  -     POC Glycosylated Hemoglobin (Hb A1C)  -     glipizide (GLUCOTROL XL) 5 MG ER tablet; Take 3 tablets by mouth Daily.  Dispense: 270 tablet; Refill: 1  -     metFORMIN ER (GLUCOPHAGE-XR) 500 MG 24 hr tablet; Take 2 tablets by mouth 2 (Two) Times a Day.  Dispense: 360 tablet; Refill: 1  -     Microalbumin / Creatinine Urine Ratio - Urine, Clean Catch    2. Mixed hyperlipidemia  History of intolerance to alternate statins.  Increase Pitavastatin to 2 mg daily.  Monitor lipid and CMP at least yearly.  -     pitavastatin calcium (Livalo) 2 MG tablet tablet; Take 1 tablet by mouth Every Night.  Dispense: 90 tablet; Refill: 1    3. Noncompliance w/medication treatment due to intermit use of medication  Taking medications only 50% of the time.  Despite multiple conversations over the years he has continued to be noncompliant with medication.  He is at significant risk for progression of complications related to uncontrolled diabetes.   I will not continue to manage his diabetes if he does take medications as directed.  He can follow-up with PCP for refills.  Patient expressed understanding.  For now, plan to follow-up in 4 months.       Return in about 4 months (around 5/27/2023) for next scheduled follow up. The patient was instructed to contact the clinic with any interval questions or concerns.    Annette Lau, DO   Endocrinologist    Please note that portions of this note were completed with a voice recognition program.

## 2023-01-27 NOTE — PROGRESS NOTES
"Chief Complaint   Patient presents with   • Diabetes     Type II          HPI   Ceasar Hawk is a 62 y.o. male had concerns including Diabetes (Type II).    He is checking blood sugars infrequently.  Current medications for diabetes include metformin 1000 mg twice daily, glipizide 15 mg once daily.    Patient is missing his medication about 50% of the time.  Has been busy with his work.  Reports he simply forgets to take the medication.    The following portions of the patient's history were reviewed and updated as appropriate: allergies, current medications, past family history, past medical history, past social history, past surgical history and problem list.      Review of Systems   Constitutional: Negative.    Endocrine:        See HPI        Physical Exam  Vitals reviewed.   Constitutional:       Appearance: Normal appearance.   Cardiovascular:      Rate and Rhythm: Normal rate.      Pulses:           Dorsalis pedis pulses are 2+ on the right side and 2+ on the left side.   Pulmonary:      Effort: Pulmonary effort is normal.   Feet:      Right foot:      Skin integrity: Skin integrity normal.      Toenail Condition: Right toenails are normal.      Left foot:      Skin integrity: Skin integrity normal.      Toenail Condition: Left toenails are normal.      Comments: Diabetic Foot Exam Performed and Monofilament Test Performed    Monofilament 5/5 bilaterally  Neurological:      General: No focal deficit present.      Mental Status: He is alert. Mental status is at baseline.   Psychiatric:         Mood and Affect: Mood normal.         Behavior: Behavior normal.        /64 (BP Location: Left arm, Patient Position: Sitting, Cuff Size: Adult)   Pulse 85   Ht 182.9 cm (72\")   Wt 89.8 kg (198 lb)   SpO2 98%   BMI 26.85 kg/m²      Labs and imaging    CMP:  Lab Results   Component Value Date    BUN 13 03/14/2022    CREATININE 1.20 03/14/2022    EGFRIFNONA 67 11/10/2021    EGFRIFAFRI  08/13/2021      Comment: "      <15 Indicative of kidney failure.    BCR 10.8 03/14/2022     03/14/2022    K 4.1 03/14/2022    CO2 24.6 03/14/2022    CALCIUM 9.6 03/14/2022    ALBUMIN 4.60 03/14/2022    BILITOT 0.5 03/14/2022    ALKPHOS 98 03/14/2022    AST 24 03/14/2022    ALT 27 03/14/2022     Lipid Panel:  Lab Results   Component Value Date    CHOL 244 (H) 03/14/2022    TRIG 226 (H) 03/14/2022    HDL 43 03/14/2022    VLDL 42 (H) 03/14/2022     (H) 03/14/2022     HbA1c:  Lab Results   Component Value Date    HGBA1C 8.9 01/27/2023    HGBA1C 9.2 04/27/2022     Glucose:    Lab Results   Component Value Date    POCGLU 177 (A) 01/27/2023     Microalbumin:  Lab Results   Component Value Date    MALBCRERATIO 33.9 11/10/2021     TSH:  Lab Results   Component Value Date    TSH 1.640 03/14/2022       Assessment and plan  Diagnoses and all orders for this visit:    1. Type 2 diabetes mellitus with hyperglycemia, without long-term current use of insulin (HCC) (Primary)  Uncontrolled with hyperglycemia.  A1c 8.9.  Complicated by CKD 2.  Patient has a long history of noncompliance, currently taking his medications 50% of the time.  Refills were sent.  Continue metformin 1000 mg twice daily, glipizide 15 mg once daily.   No GLP-1 agonist due to history of significant AMBROCIO after few days of nausea/vomiting with Rybelsus.   Was on Jardiance in the past but had tolerability issues, was in part due to hyperglycemia.  Was on Actos in the past and this was discontinued during hospitalization.  Additional medications may not be necessary if he takes his medications consistently.  CMP and lipid up-to-date from March.  Check urine microalbumin today.  Monofilament was updated today.  Ophtho exam is up-to-date from 1/19/2023 and report is scanned into the chart.  -     POC Glucose, Blood  -     POC Glycosylated Hemoglobin (Hb A1C)  -     glipizide (GLUCOTROL XL) 5 MG ER tablet; Take 3 tablets by mouth Daily.  Dispense: 270 tablet; Refill: 1  -      metFORMIN ER (GLUCOPHAGE-XR) 500 MG 24 hr tablet; Take 2 tablets by mouth 2 (Two) Times a Day.  Dispense: 360 tablet; Refill: 1  -     Microalbumin / Creatinine Urine Ratio - Urine, Clean Catch    2. Mixed hyperlipidemia  History of intolerance to alternate statins.  Increase Pitavastatin to 2 mg daily.  Monitor lipid and CMP at least yearly.  -     pitavastatin calcium (Livalo) 2 MG tablet tablet; Take 1 tablet by mouth Every Night.  Dispense: 90 tablet; Refill: 1    3. Noncompliance w/medication treatment due to intermit use of medication  Taking medications only 50% of the time.  Despite multiple conversations over the years he has continued to be noncompliant with medication.  He is at significant risk for progression of complications related to uncontrolled diabetes.   I will not continue to manage his diabetes if he does take medications as directed.  He can follow-up with PCP for refills.  Patient expressed understanding.  For now, plan to follow-up in 4 months.       Return in about 4 months (around 5/27/2023) for next scheduled follow up. The patient was instructed to contact the clinic with any interval questions or concerns.    Annette Lau, DO   Endocrinologist    Please note that portions of this note were completed with a voice recognition program.

## 2023-03-17 ENCOUNTER — LAB (OUTPATIENT)
Dept: LAB | Facility: HOSPITAL | Age: 63
End: 2023-03-17
Payer: COMMERCIAL

## 2023-03-17 ENCOUNTER — OFFICE VISIT (OUTPATIENT)
Dept: INTERNAL MEDICINE | Facility: CLINIC | Age: 63
End: 2023-03-17
Payer: COMMERCIAL

## 2023-03-17 VITALS
HEIGHT: 73 IN | HEART RATE: 94 BPM | SYSTOLIC BLOOD PRESSURE: 98 MMHG | OXYGEN SATURATION: 98 % | DIASTOLIC BLOOD PRESSURE: 72 MMHG | BODY MASS INDEX: 25.82 KG/M2 | WEIGHT: 194.8 LBS

## 2023-03-17 DIAGNOSIS — Z23 NEED FOR VACCINATION: ICD-10-CM

## 2023-03-17 DIAGNOSIS — Z12.5 PROSTATE CANCER SCREENING: ICD-10-CM

## 2023-03-17 DIAGNOSIS — Z23 NEED FOR PNEUMOCOCCAL VACCINATION: ICD-10-CM

## 2023-03-17 DIAGNOSIS — R25.2 LEG CRAMPS: ICD-10-CM

## 2023-03-17 DIAGNOSIS — Z00.00 HEALTH CARE MAINTENANCE: ICD-10-CM

## 2023-03-17 DIAGNOSIS — Z00.00 HEALTH CARE MAINTENANCE: Primary | ICD-10-CM

## 2023-03-17 LAB
ALBUMIN SERPL-MCNC: 4.8 G/DL (ref 3.5–5.2)
ALBUMIN/GLOB SERPL: 1.5 G/DL
ALP SERPL-CCNC: 103 U/L (ref 39–117)
ALT SERPL W P-5'-P-CCNC: 31 U/L (ref 1–41)
ANION GAP SERPL CALCULATED.3IONS-SCNC: 17 MMOL/L (ref 5–15)
AST SERPL-CCNC: 35 U/L (ref 1–40)
BASOPHILS # BLD AUTO: 0.09 10*3/MM3 (ref 0–0.2)
BASOPHILS NFR BLD AUTO: 0.8 % (ref 0–1.5)
BILIRUB SERPL-MCNC: 0.6 MG/DL (ref 0–1.2)
BUN SERPL-MCNC: 14 MG/DL (ref 8–23)
BUN/CREAT SERPL: 12.4 (ref 7–25)
CALCIUM SPEC-SCNC: 10.4 MG/DL (ref 8.6–10.5)
CHLORIDE SERPL-SCNC: 98 MMOL/L (ref 98–107)
CHOLEST SERPL-MCNC: 140 MG/DL (ref 0–200)
CO2 SERPL-SCNC: 23 MMOL/L (ref 22–29)
CREAT SERPL-MCNC: 1.13 MG/DL (ref 0.76–1.27)
DEPRECATED RDW RBC AUTO: 39.5 FL (ref 37–54)
EGFRCR SERPLBLD CKD-EPI 2021: 72.6 ML/MIN/1.73
EOSINOPHIL # BLD AUTO: 0.37 10*3/MM3 (ref 0–0.4)
EOSINOPHIL NFR BLD AUTO: 3.5 % (ref 0.3–6.2)
ERYTHROCYTE [DISTWIDTH] IN BLOOD BY AUTOMATED COUNT: 11.9 % (ref 12.3–15.4)
GLOBULIN UR ELPH-MCNC: 3.2 GM/DL
GLUCOSE SERPL-MCNC: 106 MG/DL (ref 65–99)
HCT VFR BLD AUTO: 45.5 % (ref 37.5–51)
HDLC SERPL-MCNC: 37 MG/DL (ref 40–60)
HGB BLD-MCNC: 15.9 G/DL (ref 13–17.7)
IMM GRANULOCYTES # BLD AUTO: 0.05 10*3/MM3 (ref 0–0.05)
IMM GRANULOCYTES NFR BLD AUTO: 0.5 % (ref 0–0.5)
LDLC SERPL CALC-MCNC: 75 MG/DL (ref 0–100)
LDLC/HDLC SERPL: 1.91 {RATIO}
LYMPHOCYTES # BLD AUTO: 2.1 10*3/MM3 (ref 0.7–3.1)
LYMPHOCYTES NFR BLD AUTO: 19.6 % (ref 19.6–45.3)
MAGNESIUM SERPL-MCNC: 1.4 MG/DL (ref 1.6–2.4)
MCH RBC QN AUTO: 31.3 PG (ref 26.6–33)
MCHC RBC AUTO-ENTMCNC: 34.9 G/DL (ref 31.5–35.7)
MCV RBC AUTO: 89.6 FL (ref 79–97)
MONOCYTES # BLD AUTO: 0.68 10*3/MM3 (ref 0.1–0.9)
MONOCYTES NFR BLD AUTO: 6.4 % (ref 5–12)
NEUTROPHILS NFR BLD AUTO: 69.2 % (ref 42.7–76)
NEUTROPHILS NFR BLD AUTO: 7.41 10*3/MM3 (ref 1.7–7)
NRBC BLD AUTO-RTO: 0.1 /100 WBC (ref 0–0.2)
PLATELET # BLD AUTO: 325 10*3/MM3 (ref 140–450)
PMV BLD AUTO: 10.5 FL (ref 6–12)
POTASSIUM SERPL-SCNC: 4.7 MMOL/L (ref 3.5–5.2)
PROT SERPL-MCNC: 8 G/DL (ref 6–8.5)
PSA SERPL-MCNC: 1.11 NG/ML (ref 0–4)
RBC # BLD AUTO: 5.08 10*6/MM3 (ref 4.14–5.8)
SODIUM SERPL-SCNC: 138 MMOL/L (ref 136–145)
TRIGL SERPL-MCNC: 162 MG/DL (ref 0–150)
TSH SERPL DL<=0.05 MIU/L-ACNC: 1.19 UIU/ML (ref 0.27–4.2)
VLDLC SERPL-MCNC: 28 MG/DL (ref 5–40)
WBC NRBC COR # BLD: 10.7 10*3/MM3 (ref 3.4–10.8)

## 2023-03-17 PROCEDURE — 91312 COVID-19 (PFIZER) BIVALENT BOOSTER 12+YRS: CPT | Performed by: PHYSICIAN ASSISTANT

## 2023-03-17 PROCEDURE — 80061 LIPID PANEL: CPT

## 2023-03-17 PROCEDURE — 36415 COLL VENOUS BLD VENIPUNCTURE: CPT

## 2023-03-17 PROCEDURE — 99396 PREV VISIT EST AGE 40-64: CPT | Performed by: PHYSICIAN ASSISTANT

## 2023-03-17 PROCEDURE — 80050 GENERAL HEALTH PANEL: CPT

## 2023-03-17 PROCEDURE — 0124A PR ADM SARSCOV2 30MCG/0.3ML BST: CPT | Performed by: PHYSICIAN ASSISTANT

## 2023-03-17 PROCEDURE — 90677 PCV20 VACCINE IM: CPT | Performed by: PHYSICIAN ASSISTANT

## 2023-03-17 PROCEDURE — 90471 IMMUNIZATION ADMIN: CPT | Performed by: PHYSICIAN ASSISTANT

## 2023-03-17 PROCEDURE — G0103 PSA SCREENING: HCPCS

## 2023-03-17 PROCEDURE — 83735 ASSAY OF MAGNESIUM: CPT

## 2023-03-17 NOTE — PROGRESS NOTES
"Chief Complaint   Patient presents with   • Annual Exam       Subjective     Ceasar Hawk is a 62 y.o. male.        History of Present Illness  The patient is being seen for a health maintenance evaluation.  The last health maintenance was 1 year(s) ago.     Social History  Ceasar  does not smoke cigarettes.   He drinks no alcohol.  He does not use illicit drugs.     General History  Ceasar  does have regular dental visits.  He does complain of vision problems. Wears reading glasses. Last eye exam was 2022.  Immunizations are not up to date. The patient needs the following immunizations: prevnar 20 and Covid booster today     Lifestyle  Ceasar  consumes a in general, a \"healthy\" diet  .  He exercises intermittently. Stays active.     Reproductive Health  Ceasar  is sexually active. His contraceptive plan is no method.   He does have erectile dysfunction.      Screening  Last PSA was 2022.  Last prostate exam was  2021. Family history of prostate cancer: none  Last testicular exam was 2022.   Last colonoscopy was Cologuard 2021, repeat 2024. Family history of colon cancer: none  Other pertinent family history and/or screenings: none     The patient presents today for an annual physical.    He sees Dr. Lau every 3 months for diabetes.    He gets cramps in his lower bilateral extremities at night when he is sleeping. He does not consume enough water. If he stands up and puts weight on it, the cramps go away. It happens 2 to 3 times a week.    He has taken medication for erectile dysfunction in the past.      Current Outpatient Medications:   •  glipizide (GLUCOTROL XL) 5 MG ER tablet, Take 3 tablets by mouth Daily., Disp: 270 tablet, Rfl: 1  •  metFORMIN ER (GLUCOPHAGE-XR) 500 MG 24 hr tablet, Take 2 tablets by mouth 2 (Two) Times a Day., Disp: 360 tablet, Rfl: 1  •  pitavastatin calcium (Livalo) 2 MG tablet tablet, Take 1 tablet by mouth Every Night., Disp: 90 tablet, Rfl: 1  •  magnesium oxide (MAG-OX) 400 MG " "tablet, Take 1 tablet by mouth 2 (Two) Times a Day., Disp: 60 tablet, Rfl: 1     PMFSH  The following portions of the patient's history were reviewed and updated as appropriate: allergies, current medications, past family history, past medical history, past social history, past surgical history and problem list.    Review of Systems   Constitutional: Negative for appetite change, fever and unexpected weight change.   HENT: Negative for ear pain, facial swelling and sore throat.    Eyes: Negative for pain and visual disturbance.   Respiratory: Negative for chest tightness, shortness of breath and wheezing.    Cardiovascular: Negative for chest pain and palpitations.   Gastrointestinal: Negative for abdominal pain and blood in stool.   Endocrine: Negative.    Genitourinary: Negative for difficulty urinating and hematuria.   Musculoskeletal: Negative for joint swelling.   Neurological: Negative for dizziness, tremors, seizures, syncope and headaches.   Hematological: Negative for adenopathy.   Psychiatric/Behavioral: Negative.        Objective   BP 98/72   Pulse 94   Ht 184.2 cm (72.5\")   Wt 88.4 kg (194 lb 12.8 oz)   SpO2 98%   BMI 26.06 kg/m²     Physical Exam  Vitals and nursing note reviewed.   Constitutional:       General: He is not in acute distress.     Appearance: He is well-developed. He is not diaphoretic.   HENT:      Head: Normocephalic and atraumatic. Hair is normal.      Right Ear: Hearing, tympanic membrane, ear canal and external ear normal.      Left Ear: Hearing, tympanic membrane, ear canal and external ear normal.      Nose: No nasal deformity.      Mouth/Throat:      Mouth: No oral lesions.      Pharynx: Uvula midline. No uvula swelling.   Eyes:      General: Lids are normal. No scleral icterus.        Right eye: No discharge.         Left eye: No discharge.      Extraocular Movements:      Right eye: Normal extraocular motion and no nystagmus.      Left eye: Normal extraocular motion and no " nystagmus.      Conjunctiva/sclera: Conjunctivae normal.      Pupils: Pupils are equal, round, and reactive to light.   Neck:      Thyroid: No thyromegaly.      Vascular: No JVD.      Trachea: No tracheal deviation.   Cardiovascular:      Rate and Rhythm: Normal rate and regular rhythm.      Pulses: Normal pulses.      Heart sounds: Normal heart sounds. No murmur heard.    No gallop.   Pulmonary:      Effort: Pulmonary effort is normal. No respiratory distress.      Breath sounds: Normal breath sounds. No wheezing or rales.   Chest:      Chest wall: No tenderness.   Abdominal:      General: Bowel sounds are normal. There is no distension.      Palpations: Abdomen is soft. There is no mass.      Tenderness: There is no abdominal tenderness. There is no guarding.      Hernia: No hernia is present.   Genitourinary:     Penis: Normal.       Testes: Normal.      Prostate: Normal.      Rectum: Normal. Guaiac result negative.      Comments: Declined chaperone for  exam  Musculoskeletal:         General: No tenderness or deformity. Normal range of motion.      Cervical back: Normal range of motion and neck supple.   Lymphadenopathy:      Cervical: No cervical adenopathy.   Skin:     General: Skin is warm and dry.      Findings: No rash.   Neurological:      Mental Status: He is alert and oriented to person, place, and time.      Cranial Nerves: No cranial nerve deficit.      Motor: No abnormal muscle tone.      Coordination: Coordination normal.      Deep Tendon Reflexes: Reflexes are normal and symmetric. Reflexes normal.   Psychiatric:         Behavior: Behavior normal.         Thought Content: Thought content normal.         Judgment: Judgment normal.         ASSESSMENT/PLAN    Diagnoses and all orders for this visit:    1. Health care maintenance (Primary)  Assessment & Plan:  Immunizations: prevnar 20 and Covid booster today  Eye exam: done 2022  Prostate exam: done today  PSA: ordered  Colonoscopy: cologuard done  2021; repeat 2024  Labs: fasting labs ordered    Counseled patient regarding multimodal approach with healthy nutrition, healthy sleep, regular physical activity, social activities, counseling, safety measures and medications.       Orders:  -     CBC & Differential; Future  -     Comprehensive Metabolic Panel; Future  -     Lipid Panel; Future  -     TSH; Future  -     PSA Screen; Future    2. Prostate cancer screening  -     PSA Screen; Future    3. Leg cramps  Comments:  - Check magnesium  - Advised to increase water intake.    Orders:  -     Magnesium; Future    4. Need for vaccination  -     COVID-19 Bivalent Booster (Pfizer) 12+yrs    5. Need for pneumococcal vaccination  -     Pneumococcal Conjugate Vaccine 20-Valent (PCV20)           Return in about 1 year (around 3/17/2024) for Annual with fasting labs.     Transcribed from ambient dictation for KELIN Betts by Rebeca Montgomery.  03/17/23   11:32 EDT    Patient or patient representative verbalized consent to the visit recording.  I have personally performed the services described in this document as transcribed by the above individual, and it is both accurate and complete.

## 2023-03-17 NOTE — PROGRESS NOTES
Immunization  Immunization performed in left and right deltoid by Phoebe Horton MA. Patient tolerated the procedure well without complications.  03/17/23   Phoebe Horton MA

## 2023-03-19 DIAGNOSIS — R79.0 LOW MAGNESIUM LEVEL: Primary | ICD-10-CM

## 2023-03-19 RX ORDER — MAGNESIUM OXIDE 400 MG/1
400 TABLET ORAL 2 TIMES DAILY
Qty: 60 TABLET | Refills: 1 | Status: SHIPPED | OUTPATIENT
Start: 2023-03-19

## 2023-03-19 NOTE — PROGRESS NOTES
Your labs show that your magnesium is low. I am sending in a magnesium supplement for you to start taking twice a day. Please stop by to recheck this level in 3-4 weeks.    Everything else looks fine.

## 2023-03-21 NOTE — ASSESSMENT & PLAN NOTE
Immunizations: prevnar 20 and Covid booster today  Eye exam: done 2022  Prostate exam: done today  PSA: ordered  Colonoscopy: cologuard done 2021; repeat 2024  Labs: fasting labs ordered    Counseled patient regarding multimodal approach with healthy nutrition, healthy sleep, regular physical activity, social activities, counseling, safety measures and medications.

## 2023-03-27 ENCOUNTER — TELEPHONE (OUTPATIENT)
Dept: INTERNAL MEDICINE | Facility: CLINIC | Age: 63
End: 2023-03-27
Payer: COMMERCIAL

## 2023-03-27 NOTE — TELEPHONE ENCOUNTER
----- Message from KELIN Redding sent at 3/26/2023  8:57 PM EDT -----  Please mail lab results to patient- it does not look like the My Chart result note was viewed.

## 2023-05-30 ENCOUNTER — OFFICE VISIT (OUTPATIENT)
Dept: ENDOCRINOLOGY | Facility: CLINIC | Age: 63
End: 2023-05-30

## 2023-05-30 VITALS
WEIGHT: 196 LBS | HEART RATE: 87 BPM | HEIGHT: 72 IN | SYSTOLIC BLOOD PRESSURE: 98 MMHG | BODY MASS INDEX: 26.55 KG/M2 | DIASTOLIC BLOOD PRESSURE: 68 MMHG

## 2023-05-30 DIAGNOSIS — E11.65 TYPE 2 DIABETES MELLITUS WITH HYPERGLYCEMIA, WITHOUT LONG-TERM CURRENT USE OF INSULIN: Primary | ICD-10-CM

## 2023-05-30 DIAGNOSIS — E78.2 MIXED HYPERLIPIDEMIA: ICD-10-CM

## 2023-05-30 LAB
EXPIRATION DATE: ABNORMAL
EXPIRATION DATE: NORMAL
GLUCOSE BLDC GLUCOMTR-MCNC: 139 MG/DL (ref 70–130)
HBA1C MFR BLD: 8.2 %
Lab: ABNORMAL
Lab: NORMAL

## 2023-05-30 RX ORDER — GLIPIZIDE 5 MG/1
15 TABLET, FILM COATED, EXTENDED RELEASE ORAL DAILY
Qty: 270 TABLET | Refills: 1 | Status: SHIPPED | OUTPATIENT
Start: 2023-05-30

## 2023-05-30 RX ORDER — METFORMIN HYDROCHLORIDE 500 MG/1
1000 TABLET, EXTENDED RELEASE ORAL 2 TIMES DAILY
Qty: 360 TABLET | Refills: 1 | Status: SHIPPED | OUTPATIENT
Start: 2023-05-30

## 2023-05-30 RX ORDER — PIOGLITAZONEHYDROCHLORIDE 15 MG/1
15 TABLET ORAL DAILY
Qty: 90 TABLET | Refills: 1 | Status: SHIPPED | OUTPATIENT
Start: 2023-05-30

## 2023-05-30 NOTE — PROGRESS NOTES
"Chief Complaint   Patient presents with   • Diabetes     Type II          HPI   Ceasar Hawk is a 62 y.o. male had concerns including Diabetes (Type II).    He is checking blood sugars 1 times per day. BGs average at 180s. Lowest recently was 70s. Was in the afternoon after not eating as much 1 day.  He sometimes feels as though he has to eat to avoid hypoglycemia.  Current medications for diabetes include metformin 1000 mg twice daily, glipizide 15 mg once daily in the morning.  He denies any missed doses.    The following portions of the patient's history were reviewed and updated as appropriate: allergies, current medications, past family history, past medical history, past social history, past surgical history and problem list.      Review of Systems   Constitutional: Negative.    Endocrine:        See HPI        Physical Exam  Vitals reviewed.   Constitutional:       Appearance: Normal appearance.   Cardiovascular:      Rate and Rhythm: Normal rate.   Pulmonary:      Effort: Pulmonary effort is normal.   Neurological:      General: No focal deficit present.      Mental Status: He is alert. Mental status is at baseline.   Psychiatric:         Mood and Affect: Mood normal.         Behavior: Behavior normal.        BP 98/68 (BP Location: Left arm, Patient Position: Sitting, Cuff Size: Adult)   Pulse 87   Ht 182.9 cm (72\")   Wt 88.9 kg (196 lb)   BMI 26.58 kg/m²      Labs and imaging    CMP:  Lab Results   Component Value Date    BUN 14 03/17/2023    CREATININE 1.13 03/17/2023    EGFRIFNONA 67 11/10/2021    EGFRIFAFRI  08/13/2021      Comment:      <15 Indicative of kidney failure.    BCR 12.4 03/17/2023     03/17/2023    K 4.7 03/17/2023    CO2 23.0 03/17/2023    CALCIUM 10.4 03/17/2023    ALBUMIN 4.8 03/17/2023    BILITOT 0.6 03/17/2023    ALKPHOS 103 03/17/2023    AST 35 03/17/2023    ALT 31 03/17/2023     Lipid Panel:  Lab Results   Component Value Date    CHOL 140 03/17/2023    TRIG 162 (H) " 03/17/2023    HDL 37 (L) 03/17/2023    VLDL 28 03/17/2023    LDL 75 03/17/2023     HbA1c:  Lab Results   Component Value Date    HGBA1C 8.2 05/30/2023    HGBA1C 8.9 01/27/2023     Glucose:    Lab Results   Component Value Date    POCGLU 139 (A) 05/30/2023     Microalbumin:  Lab Results   Component Value Date    MALBCRERATIO 6.6 01/27/2023     TSH:  Lab Results   Component Value Date    TSH 1.190 03/17/2023       Assessment and plan  Diagnoses and all orders for this visit:    1. Type 2 diabetes mellitus with hyperglycemia, without long-term current use of insulin (Primary)  Uncontrolled with hyperglycemia though improved with A1c down to 8.2.  Complicated by CKD 2.  Continue metformin 1000 mg twice daily and glipizide 15 mg once daily.  No GLP-1 agonist due to history of significant AMBROCIO after few days of nausea/vomiting with Rybelsus.   Was on Jardiance in the past but had tolerability issues, was in part due to hyperglycemia.  Was on Actos in the past and this was discontinued during hospitalization.  Resume 15 mg once daily.  Caution for possible lower extremity edema.  Consider titrating up on Actos, decreasing glipizide.  Labs are up-to-date from March.  Urine microalbumin up-to-date from January.  Monofilament up-to-date from January.  Ophtho exam up-to-date and scanned in his chart from January.  -     POC Glucose, Blood  -     POC Glycosylated Hemoglobin (Hb A1C)  -     pioglitazone (Actos) 15 MG tablet; Take 1 tablet by mouth Daily.  Dispense: 90 tablet; Refill: 1  -     glipizide (GLUCOTROL XL) 5 MG ER tablet; Take 3 tablets by mouth Daily.  Dispense: 270 tablet; Refill: 1  -     metFORMIN ER (GLUCOPHAGE-XR) 500 MG 24 hr tablet; Take 2 tablets by mouth 2 (Two) Times a Day.  Dispense: 360 tablet; Refill: 1    2. Mixed hyperlipidemia  On Pitavastatin 2 mg daily.  LDL at goal when last checked.  Triglycerides elevated, due to hyperglycemia.  Monitor yearly.  -     pitavastatin calcium (Livalo) 2 MG tablet  tablet; Take 1 tablet by mouth Every Night.  Dispense: 90 tablet; Refill: 1           Return in about 4 months (around 9/30/2023) for next scheduled follow up. The patient was instructed to contact the clinic with any interval questions or concerns.    Annette Lau, DO   Endocrinologist    Please note that portions of this note were completed with a voice recognition program.

## 2023-10-03 ENCOUNTER — OFFICE VISIT (OUTPATIENT)
Dept: ENDOCRINOLOGY | Facility: CLINIC | Age: 63
End: 2023-10-03
Payer: COMMERCIAL

## 2023-10-03 VITALS
HEART RATE: 80 BPM | BODY MASS INDEX: 26.22 KG/M2 | DIASTOLIC BLOOD PRESSURE: 68 MMHG | HEIGHT: 72 IN | SYSTOLIC BLOOD PRESSURE: 100 MMHG | WEIGHT: 193.6 LBS | OXYGEN SATURATION: 98 %

## 2023-10-03 DIAGNOSIS — E11.65 TYPE 2 DIABETES MELLITUS WITH HYPERGLYCEMIA, WITHOUT LONG-TERM CURRENT USE OF INSULIN: Primary | ICD-10-CM

## 2023-10-03 LAB
EXPIRATION DATE: ABNORMAL
EXPIRATION DATE: NORMAL
GLUCOSE BLDC GLUCOMTR-MCNC: 135 MG/DL (ref 70–130)
HBA1C MFR BLD: 8.5 %
Lab: ABNORMAL
Lab: NORMAL

## 2023-10-03 RX ORDER — DAPAGLIFLOZIN 10 MG/1
10 TABLET, FILM COATED ORAL DAILY
Qty: 30 TABLET | Refills: 5 | Status: SHIPPED | OUTPATIENT
Start: 2023-10-03

## 2023-10-03 RX ORDER — GLIPIZIDE 5 MG/1
15 TABLET, FILM COATED, EXTENDED RELEASE ORAL DAILY
Qty: 270 TABLET | Refills: 1 | Status: SHIPPED | OUTPATIENT
Start: 2023-10-03

## 2023-10-03 RX ORDER — PIOGLITAZONEHYDROCHLORIDE 30 MG/1
30 TABLET ORAL DAILY
Qty: 90 TABLET | Refills: 1 | Status: SHIPPED | OUTPATIENT
Start: 2023-10-03

## 2023-10-03 RX ORDER — METFORMIN HYDROCHLORIDE 500 MG/1
1000 TABLET, EXTENDED RELEASE ORAL 2 TIMES DAILY
Qty: 360 TABLET | Refills: 1 | Status: SHIPPED | OUTPATIENT
Start: 2023-10-03

## 2023-10-03 NOTE — PATIENT INSTRUCTIONS
Increase actos/pioglitazone to 30 mg daily.   Start farxiga 10 mg daily.   Continue metformin 100 mg BID and glipizide 15 mg daily.   If your glucose levels are < 100 often, decrease glipizide by 5 mg as needed.   If glucose levels are > 200 often, call/message me.

## 2023-12-04 RX ORDER — MAGNESIUM OXIDE 400 MG/1
400 TABLET ORAL 2 TIMES DAILY
Qty: 60 TABLET | Refills: 1 | OUTPATIENT
Start: 2023-12-04

## 2024-03-29 ENCOUNTER — LAB (OUTPATIENT)
Dept: LAB | Facility: HOSPITAL | Age: 64
End: 2024-03-29
Payer: MEDICARE

## 2024-03-29 ENCOUNTER — OFFICE VISIT (OUTPATIENT)
Dept: INTERNAL MEDICINE | Facility: CLINIC | Age: 64
End: 2024-03-29
Payer: MEDICARE

## 2024-03-29 VITALS
OXYGEN SATURATION: 98 % | HEART RATE: 95 BPM | BODY MASS INDEX: 26.58 KG/M2 | WEIGHT: 196 LBS | DIASTOLIC BLOOD PRESSURE: 70 MMHG | SYSTOLIC BLOOD PRESSURE: 112 MMHG | TEMPERATURE: 98.7 F

## 2024-03-29 DIAGNOSIS — Z12.5 SCREENING PSA (PROSTATE SPECIFIC ANTIGEN): ICD-10-CM

## 2024-03-29 DIAGNOSIS — E11.65 TYPE 2 DIABETES MELLITUS WITH HYPERGLYCEMIA, WITHOUT LONG-TERM CURRENT USE OF INSULIN: ICD-10-CM

## 2024-03-29 DIAGNOSIS — E78.2 MIXED HYPERLIPIDEMIA: ICD-10-CM

## 2024-03-29 DIAGNOSIS — Z12.11 SCREENING FOR COLON CANCER: ICD-10-CM

## 2024-03-29 DIAGNOSIS — Z00.00 WELCOME TO MEDICARE PREVENTIVE VISIT: Primary | ICD-10-CM

## 2024-03-29 DIAGNOSIS — N17.9 ACUTE RENAL FAILURE, UNSPECIFIED ACUTE RENAL FAILURE TYPE: ICD-10-CM

## 2024-03-29 LAB — HBA1C MFR BLD: 7.6 % (ref 4.8–5.6)

## 2024-03-29 PROCEDURE — 82570 ASSAY OF URINE CREATININE: CPT

## 2024-03-29 PROCEDURE — 80061 LIPID PANEL: CPT

## 2024-03-29 PROCEDURE — 83036 HEMOGLOBIN GLYCOSYLATED A1C: CPT

## 2024-03-29 PROCEDURE — 80053 COMPREHEN METABOLIC PANEL: CPT

## 2024-03-29 PROCEDURE — G0103 PSA SCREENING: HCPCS

## 2024-03-29 PROCEDURE — 82043 UR ALBUMIN QUANTITATIVE: CPT

## 2024-03-29 PROCEDURE — 84443 ASSAY THYROID STIM HORMONE: CPT

## 2024-03-29 PROCEDURE — 85025 COMPLETE CBC W/AUTO DIFF WBC: CPT

## 2024-03-29 RX ORDER — PITAVASTATIN CALCIUM 2.09 MG/1
2 TABLET, FILM COATED ORAL NIGHTLY
Qty: 90 TABLET | Refills: 1 | Status: SHIPPED | OUTPATIENT
Start: 2024-03-29

## 2024-03-29 RX ORDER — GLIPIZIDE 5 MG/1
15 TABLET, FILM COATED, EXTENDED RELEASE ORAL DAILY
Qty: 270 TABLET | Refills: 1 | Status: SHIPPED | OUTPATIENT
Start: 2024-03-29

## 2024-03-29 RX ORDER — MAGNESIUM OXIDE 400 MG/1
400 TABLET ORAL DAILY
Qty: 90 TABLET | Refills: 1 | Status: SHIPPED | OUTPATIENT
Start: 2024-03-29

## 2024-03-29 RX ORDER — PIOGLITAZONEHYDROCHLORIDE 30 MG/1
30 TABLET ORAL DAILY
Qty: 90 TABLET | Refills: 1 | Status: SHIPPED | OUTPATIENT
Start: 2024-03-29

## 2024-03-29 RX ORDER — METFORMIN HYDROCHLORIDE 500 MG/1
1000 TABLET, EXTENDED RELEASE ORAL 2 TIMES DAILY
Qty: 360 TABLET | Refills: 1 | Status: SHIPPED | OUTPATIENT
Start: 2024-03-29

## 2024-03-29 RX ORDER — DAPAGLIFLOZIN 10 MG/1
10 TABLET, FILM COATED ORAL DAILY
Qty: 90 TABLET | Refills: 1 | Status: SHIPPED | OUTPATIENT
Start: 2024-03-29

## 2024-03-29 NOTE — PATIENT INSTRUCTIONS
Diagnosis Discussed   Continue to monitor   Plenty of fluids, monitor diet and exercise   Labs ordered will notify of results   Immunizations up to date   Follow up with Endocrinology   Take medications as instructed- refills today   Follow up as directed   If symptoms worsen or persist please seek further evaluation

## 2024-03-29 NOTE — PROGRESS NOTES
The ABCs of the Annual Wellness Visit  Welcome to Medicare Visit    Subjective     Ceasar Hawk is a 63 y.o. male who presents for a  Welcome to Medicare Visit.- is currently fasting     Establishing care with new doctor   No issues or concerns   Some mild drainage back of throat   No fevers, chills, sob, chest pain.     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 No dermatology.       Dental/Eye exams - up to date. Vision is stable.- DM retinal exams   Diet/Exercise- Diet- Healthy- moderate - fast foods   Exercise- walking. Stays active at work     Colonoscopy/Cologuard - Cologuard- every 3 years last was       Immunizations  Tdap- up to date.   Flu- completed  Shingles- will hold   COVID- completed + boosters   PNA- completed     Diabetes Foot exam-   Mild tingling of feet at nighttime   Denies open sore or wounds  Denies falls     The following portions of the patient's history were reviewed and   updated as appropriate: allergies, current medications, past family history, past medical history, past social history, past surgical history, and problem list.     Compared to one year ago, the patient feels his physical   health is the same.    Compared to one year ago, the patient feels his mental   health is the same.    Recent Hospitalizations:  He was not admitted to the  [facility] during the last year.       Current Medical Providers:  Patient Care Team:  Isadora Zurita MD as PCP - General (Internal Medicine)  Annette Lau DO as Consulting Physician (Endocrinology)    Outpatient Medications Prior to Visit   Medication Sig Dispense Refill    glucose blood test strip Use to check blood glucose 2 times daily 200 each 3    dapagliflozin Propanediol (Farxiga) 10 MG tablet Take 10 mg by mouth Daily. 30 tablet 5    glipizide (GLUCOTROL XL) 5 MG ER tablet Take 3 tablets by mouth Daily. 270 tablet 1    magnesium oxide (MAG-OX) 400 MG tablet Take 1 tablet by mouth 2 (Two) Times a Day. 60 tablet 1    metFORMIN ER (GLUCOPHAGE-XR) 500 MG 24 hr tablet Take 2 tablets by mouth 2 (Two) Times a Day. 360 tablet 1    pioglitazone (Actos) 30 MG tablet Take 1 tablet by mouth Daily. 90 tablet 1    pitavastatin calcium (Livalo) 2 MG tablet tablet Take 1 tablet by mouth Every Night. 90 tablet 1     No facility-administered medications prior to visit.       No opioid medication identified on active medication list. I have reviewed chart for other potential  high risk medication/s and harmful drug interactions in the elderly.        Aspirin is not on active medication list.  Aspirin use is not indicated based on review of current medical condition/s. Risk of harm outweighs potential benefits.  .    Patient Active Problem List   Diagnosis    Type 2 diabetes mellitus with hyperglycemia, without long-term current use of insulin    Mixed hyperlipidemia    Elevated liver enzymes    Fatty infiltration of liver    Acute renal failure    Elevated lipase     Advance Care Planning   Advance Care Planning     Advance Directive is not on file.  ACP discussion was held with the patient during this visit. Patient does not have an advance directive, information provided.       Objective   Vitals:    03/29/24 1248   BP: 112/70   Pulse: 95   Temp: 98.7 °F (37.1 °C)   TempSrc: Temporal   SpO2: 98%   Weight: 88.9 kg  "(196 lb)     Estimated body mass index is 26.58 kg/m² as calculated from the following:    Height as of 10/3/23: 182.9 cm (72\").    Weight as of this encounter: 88.9 kg (196 lb).    BMI is >= 25 and <30. (Overweight) The following options were offered after discussion;: exercise counseling/recommendations and nutrition counseling/recommendations      Does the patient have evidence of cognitive impairment?   No           ECG 12 Lead    Date/Time: 3/29/2024 4:27 PM  Performed by: Isadora Zurita MD    Authorized by: Isadora Zurita MD  Comparison: compared with previous ECG   Similar to previous ECG  Rhythm: sinus rhythm    Clinical impression: normal ECG and non-specific ECG             HEALTH RISK ASSESSMENT    Smoking Status:  Social History     Tobacco Use   Smoking Status Never   Smokeless Tobacco Never     Alcohol Consumption:  Social History     Substance and Sexual Activity   Alcohol Use No       Fall Risk Screen:    STEADI Fall Risk Assessment was completed, and patient is at LOW risk for falls.Assessment completed on:3/29/2024    Depression Screen:       3/29/2024    12:52 PM   PHQ-2/PHQ-9 Depression Screening   Little Interest or Pleasure in Doing Things 0-->not at all   Feeling Down, Depressed or Hopeless 0-->not at all   PHQ-9: Brief Depression Severity Measure Score 0       Health Habits and Functional and Cognitive Screening:      3/29/2024    12:10 PM   Functional & Cognitive Status   Do you have difficulty preparing food and eating? No   Do you have difficulty bathing yourself, getting dressed or grooming yourself? No   Do you have difficulty using the toilet? No   Do you have difficulty moving around from place to place? No   Do you have trouble with steps or getting out of a bed or a chair? No   Current Diet Frequent Junk Food   Dental Exam Up to date   Eye Exam Up to date   Exercise (times per week) 0 times per week   Do you need help using the phone?  No   Are you deaf or do you have serious " difficulty hearing?  No   Do you need help to go to places out of walking distance? No   Do you need help shopping? No   Do you need help preparing meals?  No   Do you need help with housework?  No   Do you need help with laundry? No   Do you need help taking your medications? No   Do you need help managing money? No   Do you ever drive or ride in a car without wearing a seat belt? No   Have you felt unusual stress, anger or loneliness in the last month? No   Who do you live with? Spouse   If you need help, do you have trouble finding someone available to you? No   Have you been bothered in the last four weeks by sexual problems? No   Do you have difficulty concentrating, remembering or making decisions? No       Visual Acuity:    Vision Screening    Right eye Left eye Both eyes   Without correction 20/30 20/40 20/20   With correction          Age-appropriate Screening Schedule:  Refer to the list below for future screening recommendations based on patient's age, sex and/or medical conditions. Orders for these recommended tests are listed in the plan section. The patient has been provided with a written plan.    Health Maintenance   Topic Date Due    ANNUAL WELLNESS VISIT  Never done    DIABETIC EYE EXAM  01/19/2024    URINE MICROALBUMIN  01/27/2024    LIPID PANEL  03/17/2024    HEMOGLOBIN A1C  04/03/2024    ZOSTER VACCINE (1 of 2) 03/29/2024 (Originally 12/22/2008)    COVID-19 Vaccine (5 - 2023-24 season) 03/31/2024 (Originally 9/1/2023)    RSV Vaccine - Adults (1 - 1-dose 60+ series) 03/29/2025 (Originally 12/22/2018)    DIABETIC FOOT EXAM  03/29/2025    BMI FOLLOWUP  03/29/2025    TDAP/TD VACCINES (2 - Td or Tdap) 02/09/2027    COLORECTAL CANCER SCREENING  02/09/2027    HEPATITIS C SCREENING  Completed    INFLUENZA VACCINE  Completed    Pneumococcal Vaccine 65+  Completed        CMS Preventative Services Quick Reference  Risk Factors Identified During Encounter    None Identified  The above risks/problems have  been discussed with the patient.  Pertinent information has been shared with the patient in the After Visit Summary.    Follow Up:   Initial Medicare Visit in one year    An After Visit Summary and PPPS were made available to the patient.      Additional E&M Note during same encounter follows:  Patient has multiple medical problems which are significant and separately identifiable that require additional work above and beyond the Medicare Wellness Visit.      Chief Complaint  Welcome To Medicare    Subjective        HPI  Ceasar Hawk is also being seen today for establish care and medication refills  Stable on current medications   Some post nasal drip   Denies productive cough, fevers, chills, sob   Dry cough- will trial Flonase daily for symptom control          Objective   Vital Signs:  /70   Pulse 95   Temp 98.7 °F (37.1 °C) (Temporal)   Wt 88.9 kg (196 lb)   SpO2 98%   BMI 26.58 kg/m²     Physical Exam  Vitals and nursing note reviewed.   Constitutional:       General: He is not in acute distress.     Appearance: Normal appearance.   HENT:      Head: Normocephalic and atraumatic.      Right Ear: Tympanic membrane normal.      Left Ear: Tympanic membrane normal.      Nose: Nose normal.      Mouth/Throat:      Mouth: Mucous membranes are moist.   Eyes:      Extraocular Movements: Extraocular movements intact.      Conjunctiva/sclera: Conjunctivae normal.      Pupils: Pupils are equal, round, and reactive to light.   Cardiovascular:      Rate and Rhythm: Normal rate and regular rhythm.      Heart sounds: Normal heart sounds.   Pulmonary:      Effort: Pulmonary effort is normal. No respiratory distress.      Breath sounds: Normal breath sounds.   Abdominal:      General: Bowel sounds are normal.      Palpations: Abdomen is soft.   Musculoskeletal:         General: Normal range of motion.      Cervical back: Normal range of motion.      Comments: Moving all extremities    Skin:     General: Skin is warm  and dry.   Neurological:      General: No focal deficit present.      Mental Status: He is alert and oriented to person, place, and time.   Psychiatric:         Mood and Affect: Mood normal.         Behavior: Behavior normal.         Thought Content: Thought content normal.         Judgment: Judgment normal.            Assessment and Plan   Diagnoses and all orders for this visit:    1. Welcome to Medicare preventive visit (Primary)  -     ECG 12 Lead    2. Mixed hyperlipidemia  -     Hemoglobin A1c; Future  -     Lipid Panel; Future  -     TSH Rfx On Abnormal To Free T4; Future  -     pitavastatin calcium (Livalo) 2 MG tablet tablet; Take 1 tablet by mouth Every Night.  Dispense: 90 tablet; Refill: 1    3. Type 2 diabetes mellitus with hyperglycemia, without long-term current use of insulin  -     CBC & Differential; Future  -     Comprehensive Metabolic Panel; Future  -     Hemoglobin A1c; Future  -     Lipid Panel; Future  -     TSH Rfx On Abnormal To Free T4; Future  -     Microalbumin / Creatinine Urine Ratio - Urine, Clean Catch; Future  -     Ambulatory Referral for Diabetic Eye Exam-Optometry  -     dapagliflozin Propanediol (Farxiga) 10 MG tablet; Take 10 mg by mouth Daily.  Dispense: 90 tablet; Refill: 1  -     glipizide (GLUCOTROL XL) 5 MG ER tablet; Take 3 tablets by mouth Daily.  Dispense: 270 tablet; Refill: 1  -     metFORMIN ER (GLUCOPHAGE-XR) 500 MG 24 hr tablet; Take 2 tablets by mouth 2 (Two) Times a Day.  Dispense: 360 tablet; Refill: 1  -     pioglitazone (Actos) 30 MG tablet; Take 1 tablet by mouth Daily.  Dispense: 90 tablet; Refill: 1    4. Acute renal failure, unspecified acute renal failure type  -     CBC & Differential; Future  -     Comprehensive Metabolic Panel; Future  -     TSH Rfx On Abnormal To Free T4; Future  -     Microalbumin / Creatinine Urine Ratio - Urine, Clean Catch; Future    5. Screening PSA (prostate specific antigen)  -     PSA Screen; Future    6. Screening for colon  cancer  -     Cologuard - Stool, Per Rectum; Future    Other orders  -     magnesium oxide (MAG-OX) 400 MG tablet; Take 1 tablet by mouth Daily.  Dispense: 90 tablet; Refill: 1         I spent 30 minutes caring for Ceasar on this date of service. This time includes time spent by me in the following activities:reviewing tests, obtaining and/or reviewing a separately obtained history, and documenting information in the medical record  Follow Up   Return in about 1 year (around 3/29/2025), or if symptoms worsen or fail to improve, for Medicare Wellness, fasting labs.  Patient was given instructions and counseling regarding his condition or for health maintenance advice. Please see specific information pulled into the AVS if appropriate.

## 2024-03-30 LAB
ALBUMIN SERPL-MCNC: 4.4 G/DL (ref 3.5–5.2)
ALBUMIN UR-MCNC: <1.2 MG/DL
ALBUMIN/GLOB SERPL: 1.5 G/DL
ALP SERPL-CCNC: 119 U/L (ref 39–117)
ALT SERPL W P-5'-P-CCNC: 22 U/L (ref 1–41)
ANION GAP SERPL CALCULATED.3IONS-SCNC: 13.4 MMOL/L (ref 5–15)
AST SERPL-CCNC: 23 U/L (ref 1–40)
BASOPHILS # BLD AUTO: 0.06 10*3/MM3 (ref 0–0.2)
BASOPHILS NFR BLD AUTO: 0.6 % (ref 0–1.5)
BILIRUB SERPL-MCNC: 0.5 MG/DL (ref 0–1.2)
BUN SERPL-MCNC: 15 MG/DL (ref 8–23)
BUN/CREAT SERPL: 11.6 (ref 7–25)
CALCIUM SPEC-SCNC: 9.6 MG/DL (ref 8.6–10.5)
CHLORIDE SERPL-SCNC: 104 MMOL/L (ref 98–107)
CHOLEST SERPL-MCNC: 267 MG/DL (ref 0–200)
CO2 SERPL-SCNC: 23.6 MMOL/L (ref 22–29)
CREAT SERPL-MCNC: 1.29 MG/DL (ref 0.76–1.27)
CREAT UR-MCNC: 135.3 MG/DL
DEPRECATED RDW RBC AUTO: 43.5 FL (ref 37–54)
EGFRCR SERPLBLD CKD-EPI 2021: 61.5 ML/MIN/1.73
EOSINOPHIL # BLD AUTO: 0.28 10*3/MM3 (ref 0–0.4)
EOSINOPHIL NFR BLD AUTO: 2.8 % (ref 0.3–6.2)
ERYTHROCYTE [DISTWIDTH] IN BLOOD BY AUTOMATED COUNT: 12.8 % (ref 12.3–15.4)
GLOBULIN UR ELPH-MCNC: 3 GM/DL
GLUCOSE SERPL-MCNC: 99 MG/DL (ref 65–99)
HCT VFR BLD AUTO: 48.6 % (ref 37.5–51)
HDLC SERPL-MCNC: 45 MG/DL (ref 40–60)
HGB BLD-MCNC: 16.1 G/DL (ref 13–17.7)
IMM GRANULOCYTES # BLD AUTO: 0.06 10*3/MM3 (ref 0–0.05)
IMM GRANULOCYTES NFR BLD AUTO: 0.6 % (ref 0–0.5)
LDLC SERPL CALC-MCNC: 181 MG/DL (ref 0–100)
LDLC/HDLC SERPL: 3.98 {RATIO}
LYMPHOCYTES # BLD AUTO: 1.47 10*3/MM3 (ref 0.7–3.1)
LYMPHOCYTES NFR BLD AUTO: 14.8 % (ref 19.6–45.3)
MCH RBC QN AUTO: 30.4 PG (ref 26.6–33)
MCHC RBC AUTO-ENTMCNC: 33.1 G/DL (ref 31.5–35.7)
MCV RBC AUTO: 91.7 FL (ref 79–97)
MICROALBUMIN/CREAT UR: NORMAL MG/G{CREAT}
MONOCYTES # BLD AUTO: 0.59 10*3/MM3 (ref 0.1–0.9)
MONOCYTES NFR BLD AUTO: 5.9 % (ref 5–12)
NEUTROPHILS NFR BLD AUTO: 7.48 10*3/MM3 (ref 1.7–7)
NEUTROPHILS NFR BLD AUTO: 75.3 % (ref 42.7–76)
NRBC BLD AUTO-RTO: 0 /100 WBC (ref 0–0.2)
PLATELET # BLD AUTO: 280 10*3/MM3 (ref 140–450)
PMV BLD AUTO: 10.5 FL (ref 6–12)
POTASSIUM SERPL-SCNC: 4.7 MMOL/L (ref 3.5–5.2)
PROT SERPL-MCNC: 7.4 G/DL (ref 6–8.5)
PSA SERPL-MCNC: 1 NG/ML (ref 0–4)
RBC # BLD AUTO: 5.3 10*6/MM3 (ref 4.14–5.8)
SODIUM SERPL-SCNC: 141 MMOL/L (ref 136–145)
TRIGL SERPL-MCNC: 215 MG/DL (ref 0–150)
TSH SERPL DL<=0.05 MIU/L-ACNC: 0.79 UIU/ML (ref 0.27–4.2)
VLDLC SERPL-MCNC: 41 MG/DL (ref 5–40)
WBC NRBC COR # BLD AUTO: 9.94 10*3/MM3 (ref 3.4–10.8)

## 2024-12-25 DIAGNOSIS — E11.65 TYPE 2 DIABETES MELLITUS WITH HYPERGLYCEMIA, WITHOUT LONG-TERM CURRENT USE OF INSULIN: ICD-10-CM

## 2024-12-26 RX ORDER — METFORMIN HYDROCHLORIDE 500 MG/1
1000 TABLET, EXTENDED RELEASE ORAL 2 TIMES DAILY
Qty: 360 TABLET | Refills: 1 | Status: SHIPPED | OUTPATIENT
Start: 2024-12-26

## 2024-12-26 RX ORDER — GLIPIZIDE 5 MG/1
15 TABLET, FILM COATED, EXTENDED RELEASE ORAL DAILY
Qty: 270 TABLET | Refills: 1 | Status: SHIPPED | OUTPATIENT
Start: 2024-12-26

## 2024-12-26 RX ORDER — PIOGLITAZONE 30 MG/1
30 TABLET ORAL DAILY
Qty: 90 TABLET | Refills: 1 | Status: SHIPPED | OUTPATIENT
Start: 2024-12-26

## 2025-04-03 ENCOUNTER — OFFICE VISIT (OUTPATIENT)
Dept: INTERNAL MEDICINE | Facility: CLINIC | Age: 65
End: 2025-04-03
Payer: MEDICARE

## 2025-04-03 ENCOUNTER — LAB (OUTPATIENT)
Dept: LAB | Facility: HOSPITAL | Age: 65
End: 2025-04-03
Payer: MEDICARE

## 2025-04-03 VITALS
WEIGHT: 196 LBS | HEIGHT: 72 IN | SYSTOLIC BLOOD PRESSURE: 112 MMHG | DIASTOLIC BLOOD PRESSURE: 74 MMHG | BODY MASS INDEX: 26.55 KG/M2 | OXYGEN SATURATION: 97 % | HEART RATE: 80 BPM | RESPIRATION RATE: 16 BRPM

## 2025-04-03 DIAGNOSIS — E11.65 TYPE 2 DIABETES MELLITUS WITH HYPERGLYCEMIA, WITHOUT LONG-TERM CURRENT USE OF INSULIN: ICD-10-CM

## 2025-04-03 DIAGNOSIS — Z12.5 SCREENING PSA (PROSTATE SPECIFIC ANTIGEN): ICD-10-CM

## 2025-04-03 DIAGNOSIS — E78.2 MIXED HYPERLIPIDEMIA: ICD-10-CM

## 2025-04-03 DIAGNOSIS — R74.8 ELEVATED LIPASE: ICD-10-CM

## 2025-04-03 DIAGNOSIS — M75.01 ADHESIVE CAPSULITIS OF RIGHT SHOULDER: ICD-10-CM

## 2025-04-03 DIAGNOSIS — Z00.00 MEDICARE ANNUAL WELLNESS VISIT, SUBSEQUENT: Primary | ICD-10-CM

## 2025-04-03 DIAGNOSIS — K76.0 FATTY INFILTRATION OF LIVER: ICD-10-CM

## 2025-04-03 DIAGNOSIS — Z12.11 SCREENING FOR COLON CANCER: ICD-10-CM

## 2025-04-03 DIAGNOSIS — M25.511 ACUTE PAIN OF RIGHT SHOULDER: ICD-10-CM

## 2025-04-03 LAB
ALBUMIN SERPL-MCNC: 4.1 G/DL (ref 3.5–5.2)
ALBUMIN/GLOB SERPL: 1.2 G/DL
ALP SERPL-CCNC: 114 U/L (ref 39–117)
ALT SERPL W P-5'-P-CCNC: 13 U/L (ref 1–41)
ANION GAP SERPL CALCULATED.3IONS-SCNC: 13.6 MMOL/L (ref 5–15)
AST SERPL-CCNC: 18 U/L (ref 1–40)
BILIRUB SERPL-MCNC: 0.4 MG/DL (ref 0–1.2)
BUN SERPL-MCNC: 10 MG/DL (ref 8–23)
BUN/CREAT SERPL: 9.7 (ref 7–25)
CALCIUM SPEC-SCNC: 9.8 MG/DL (ref 8.6–10.5)
CHLORIDE SERPL-SCNC: 101 MMOL/L (ref 98–107)
CHOLEST SERPL-MCNC: 219 MG/DL (ref 0–200)
CO2 SERPL-SCNC: 25.4 MMOL/L (ref 22–29)
CREAT SERPL-MCNC: 1.03 MG/DL (ref 0.76–1.27)
DEPRECATED RDW RBC AUTO: 39.8 FL (ref 37–54)
EGFRCR SERPLBLD CKD-EPI 2021: 80.1 ML/MIN/1.73
ERYTHROCYTE [DISTWIDTH] IN BLOOD BY AUTOMATED COUNT: 11.9 % (ref 12.3–15.4)
GLOBULIN UR ELPH-MCNC: 3.5 GM/DL
GLUCOSE SERPL-MCNC: 163 MG/DL (ref 65–99)
HBA1C MFR BLD: 10.2 % (ref 4.8–5.6)
HCT VFR BLD AUTO: 43.5 % (ref 37.5–51)
HDLC SERPL-MCNC: 40 MG/DL (ref 40–60)
HGB BLD-MCNC: 14.3 G/DL (ref 13–17.7)
LDLC SERPL CALC-MCNC: 156 MG/DL (ref 0–100)
LDLC/HDLC SERPL: 3.83 {RATIO}
MCH RBC QN AUTO: 30.2 PG (ref 26.6–33)
MCHC RBC AUTO-ENTMCNC: 32.9 G/DL (ref 31.5–35.7)
MCV RBC AUTO: 91.8 FL (ref 79–97)
PLATELET # BLD AUTO: 335 10*3/MM3 (ref 140–450)
PMV BLD AUTO: 11.2 FL (ref 6–12)
POTASSIUM SERPL-SCNC: 4.4 MMOL/L (ref 3.5–5.2)
PROT SERPL-MCNC: 7.6 G/DL (ref 6–8.5)
RBC # BLD AUTO: 4.74 10*6/MM3 (ref 4.14–5.8)
SODIUM SERPL-SCNC: 140 MMOL/L (ref 136–145)
TRIGL SERPL-MCNC: 129 MG/DL (ref 0–150)
VLDLC SERPL-MCNC: 23 MG/DL (ref 5–40)
WBC NRBC COR # BLD AUTO: 9.38 10*3/MM3 (ref 3.4–10.8)

## 2025-04-03 PROCEDURE — 80061 LIPID PANEL: CPT

## 2025-04-03 PROCEDURE — 83036 HEMOGLOBIN GLYCOSYLATED A1C: CPT

## 2025-04-03 PROCEDURE — 82570 ASSAY OF URINE CREATININE: CPT

## 2025-04-03 PROCEDURE — G0103 PSA SCREENING: HCPCS

## 2025-04-03 PROCEDURE — 84443 ASSAY THYROID STIM HORMONE: CPT

## 2025-04-03 PROCEDURE — 82043 UR ALBUMIN QUANTITATIVE: CPT

## 2025-04-03 PROCEDURE — 80053 COMPREHEN METABOLIC PANEL: CPT

## 2025-04-03 PROCEDURE — 85027 COMPLETE CBC AUTOMATED: CPT

## 2025-04-03 RX ORDER — METFORMIN HYDROCHLORIDE 500 MG/1
1000 TABLET, EXTENDED RELEASE ORAL 2 TIMES DAILY
Qty: 360 TABLET | Refills: 1 | Status: SHIPPED | OUTPATIENT
Start: 2025-04-03

## 2025-04-03 RX ORDER — GLIPIZIDE 5 MG/1
15 TABLET, FILM COATED, EXTENDED RELEASE ORAL DAILY
Qty: 270 TABLET | Refills: 1 | Status: SHIPPED | OUTPATIENT
Start: 2025-04-03

## 2025-04-03 RX ORDER — PIOGLITAZONE 30 MG/1
30 TABLET ORAL DAILY
Qty: 90 TABLET | Refills: 1 | Status: SHIPPED | OUTPATIENT
Start: 2025-04-03

## 2025-04-03 RX ORDER — ROSUVASTATIN CALCIUM 20 MG/1
20 TABLET, COATED ORAL DAILY
Qty: 90 TABLET | Refills: 1 | Status: SHIPPED | OUTPATIENT
Start: 2025-04-03

## 2025-04-03 NOTE — ASSESSMENT & PLAN NOTE
Orders:    glipizide (GLUCOTROL XL) 5 MG ER tablet; Take 3 tablets by mouth Daily.    metFORMIN ER (GLUCOPHAGE-XR) 500 MG 24 hr tablet; Take 2 tablets by mouth 2 (Two) Times a Day.    pioglitazone (Actos) 30 MG tablet; Take 1 tablet by mouth Daily.    CBC (No Diff); Future    Hemoglobin A1c; Future    TSH Rfx On Abnormal To Free T4; Future    Microalbumin / Creatinine Urine Ratio - Urine, Clean Catch; Future  stopped Farxiga for some time   May need addition pending results

## 2025-04-03 NOTE — ASSESSMENT & PLAN NOTE
Orders:    rosuvastatin (Crestor) 20 MG tablet; Take 1 tablet by mouth Daily.    CBC (No Diff); Future    Lipid Panel; Future    TSH Rfx On Abnormal To Free T4; Future  restart cholesterol lowering medication

## 2025-04-03 NOTE — PROGRESS NOTES
Subjective   The ABCs of the Annual Wellness Visit  Medicare Wellness Visit      Ceasar Hawk is a 64 y.o. patient who presents for a Medicare Wellness Visit.  Is not currently fasting     Stopped farxiga, stopped cholesterol medication   Last HgA1c 7.6    Right shoulder pain x 2 weeks- no injury  Soreness   With movement- unable to sleep on that side   No swelling  Blood sugar in the 200's     OTC- tylenol with some relief     Is left handed   PMH-  HPL   DM   Elevated liver enzymes   Fatty liver  Acute renal failure   Elevated lipase      Meds-  Glipizide 5mg tablets- 15mg daily   Magnesium   Metformin 1000mg twice a day   Actos 30mg daily      Family Hx-   Maternal GM- - unsure  Maternal GF- - black lung-    Paternal GM- - unsure   Paternal GF- - unsure   Mother- alive- healthy  Father- - heart attack, Diabetes   Sister- alive- Healthy   Brother- alive- Healthy   Brother- alive- Healthy      Alcohol - none   Smoking- none   Drug use - none   Job - parts and - [employer]   Stress - 5/10    Sun Exposure - protects skin. No dermatology.         Dental/Eye exams - up to date. Vision is stable.- DM retinal exams - due for DM eye exam   Diet/Exercise- Diet- Healthy- moderate - fast foods  70%   Exercise- walking. Stays active at work - walking      Colonoscopy/Cologuard - Cologuard- every 3 years last was      due this year     Immunizations  Tdap- up to date.   Flu- completed  Shingles- will hold   COVID- completed + boosters   PNA- completed      Diabetes Foot exam-   Mild tingling of feet at nighttime   Denies open sore or wounds  Denies falls     The following portions of the patient's history were reviewed and   updated as appropriate: allergies, current medications, past family history, past medical history, past social history, past surgical history, and problem list.    Compared to one year ago, the patient's physical   health is  worse.  Compared to one year ago, the patient's mental   health is the same.    Recent Hospitalizations:  He was not admitted to the hospital during the last year.     Current Medical Providers:  Patient Care Team:  Isadora Zurita MD as PCP - General (Internal Medicine)  Annette Lau DO as Consulting Physician (Endocrinology)    Outpatient Medications Prior to Visit   Medication Sig Dispense Refill    glucose blood test strip Use to check blood glucose 2 times daily 200 each 3    glipizide (GLUCOTROL XL) 5 MG ER tablet Take 3 tablets by mouth Daily. 270 tablet 1    metFORMIN ER (GLUCOPHAGE-XR) 500 MG 24 hr tablet Take 2 tablets by mouth 2 (Two) Times a Day. 360 tablet 1    pioglitazone (Actos) 30 MG tablet Take 1 tablet by mouth Daily. 90 tablet 1    dapagliflozin Propanediol (Farxiga) 10 MG tablet Take 10 mg by mouth Daily. 90 tablet 1    magnesium oxide (MAG-OX) 400 MG tablet Take 1 tablet by mouth Daily. (Patient not taking: Reported on 4/3/2025) 90 tablet 1    pitavastatin calcium (Livalo) 2 MG tablet tablet Take 1 tablet by mouth Every Night. 90 tablet 1     No facility-administered medications prior to visit.     No opioid medication identified on active medication list. I have reviewed chart for other potential  high risk medication/s and harmful drug interactions in the elderly.      Aspirin is not on active medication list.  Aspirin use is not indicated based on review of current medical condition/s. Risk of harm outweighs potential benefits.  .    Patient Active Problem List   Diagnosis    Type 2 diabetes mellitus with hyperglycemia, without long-term current use of insulin    Mixed hyperlipidemia    Elevated liver enzymes    Fatty infiltration of liver    Acute renal failure    Elevated lipase     Advance Care Planning Advance Directive is not on file.  ACP discussion was held with the patient during this visit. Patient does not have an advance directive, information provided.   "          Objective   Vitals:    25 1356   BP: 112/74   Pulse: 80   Resp: 16   SpO2: 97%   Weight: 88.9 kg (196 lb)   Height: 182.9 cm (72\")   PainSc: 4        Estimated body mass index is 26.58 kg/m² as calculated from the following:    Height as of this encounter: 182.9 cm (72\").    Weight as of this encounter: 88.9 kg (196 lb).    BMI is >= 25 and <30. (Overweight) The following options were offered after discussion;: exercise counseling/recommendations and nutrition counseling/recommendations           Does the patient have evidence of cognitive impairment? No  Lab Results   Component Value Date    TRIG 129 2025    HDL 40 2025     (H) 2025    VLDL 23 2025    HGBA1C 10.20 (H) 2025                                                                                                Health  Risk Assessment    Smoking Status:  Social History     Tobacco Use   Smoking Status Never   Smokeless Tobacco Never     Alcohol Consumption:  Social History     Substance and Sexual Activity   Alcohol Use No       Fall Risk Screen  STEADI Fall Risk Assessment was completed, and patient is at LOW risk for falls.Assessment completed on:4/3/2025    Depression Screening   Little interest or pleasure in doing things? Not at all   Feeling down, depressed, or hopeless? Not at all   PHQ-2 Total Score 0      Health Habits and Functional and Cognitive Screenin/3/2025     2:16 PM   Functional & Cognitive Status   Do you have difficulty preparing food and eating? No   Do you have difficulty bathing yourself, getting dressed or grooming yourself? No   Do you have difficulty using the toilet? No   Do you have difficulty moving around from place to place? No   Do you have trouble with steps or getting out of a bed or a chair? No   Current Diet Low Carb Diet   Dental Exam Up to date   Eye Exam Up to date   Exercise (times per week) 0 times per week   Current Exercises Include Walking   Do you need " help using the phone?  No   Are you deaf or do you have serious difficulty hearing?  No   Do you need help to go to places out of walking distance? No   Do you need help shopping? No   Do you need help preparing meals?  No   Do you need help with housework?  No   Do you need help with laundry? No   Do you need help taking your medications? No   Do you need help managing money? No   Do you ever drive or ride in a car without wearing a seat belt? No   Have you felt unusual stress, anger or loneliness in the last month? No   Who do you live with? Spouse   If you need help, do you have trouble finding someone available to you? No   Have you been bothered in the last four weeks by sexual problems? No   Do you have difficulty concentrating, remembering or making decisions? No           Age-appropriate Screening Schedule:  Refer to the list below for future screening recommendations based on patient's age, sex and/or medical conditions. Orders for these recommended tests are listed in the plan section. The patient has been provided with a written plan.    Health Maintenance List  Health Maintenance   Topic Date Due    DIABETIC EYE EXAM  01/19/2024    DIABETIC FOOT EXAM  03/29/2025    ZOSTER VACCINE (1 of 2) 09/30/2025 (Originally 12/22/2008)    COVID-19 Vaccine (5 - 2024-25 season) 10/03/2025 (Originally 9/1/2024)    INFLUENZA VACCINE  07/01/2025    HEMOGLOBIN A1C  10/03/2025    ANNUAL WELLNESS VISIT  04/03/2026    LIPID PANEL  04/03/2026    URINE MICROALBUMIN-CREATININE RATIO (uACR)  04/04/2026    TDAP/TD VACCINES (2 - Td or Tdap) 02/09/2027    COLORECTAL CANCER SCREENING  02/09/2027    HEPATITIS C SCREENING  Completed    Pneumococcal Vaccine 50+  Completed                                                                                                                                                CMS Preventative Services Quick Reference  Risk Factors Identified During Encounter  None Identified    The above  "risks/problems have been discussed with the patient.  Pertinent information has been shared with the patient in the After Visit Summary.  An After Visit Summary and PPPS were made available to the patient.    Follow Up:   Next Medicare Wellness visit to be scheduled in 1 year.         Additional E&M Note during same encounter follows:  Patient has additional, significant, and separately identifiable condition(s)/problem(s) that require work above and beyond the Medicare Wellness Visit     Chief Complaint  Medicare Wellness-Initial Visit and Shoulder Pain    Subjective   HPI  Ceasar is also being seen today for an annual adult preventative physical exam.     Review of Systems   Constitutional:  Negative for chills and fever.   HENT:  Negative for dental problem, trouble swallowing and voice change.    Eyes:  Negative for visual disturbance.   Respiratory:  Negative for cough, chest tightness, shortness of breath and wheezing.    Cardiovascular:  Negative for chest pain, palpitations and leg swelling.   Gastrointestinal:  Negative for abdominal pain, blood in stool, constipation, diarrhea, nausea and vomiting.   Genitourinary:  Negative for dysuria.   Musculoskeletal:  Positive for arthralgias and myalgias. Negative for gait problem.        Right shoulder pain x 2 weeks- no injury. Stiffness- pain with movement.    Skin:  Negative for rash.   Neurological:  Negative for light-headedness.   Psychiatric/Behavioral:  Negative for dysphoric mood.       Objective   Vital Signs:  /74   Pulse 80   Resp 16   Ht 182.9 cm (72\")   Wt 88.9 kg (196 lb)   SpO2 97%   BMI 26.58 kg/m²   Physical Exam  Vitals and nursing note reviewed.   Constitutional:       General: He is not in acute distress.     Appearance: Normal appearance.   HENT:      Head: Normocephalic and atraumatic.      Right Ear: Tympanic membrane normal.      Left Ear: Tympanic membrane normal.      Nose: Nose normal.      Mouth/Throat:      Mouth: Mucous " membranes are moist.   Eyes:      Extraocular Movements: Extraocular movements intact.      Conjunctiva/sclera: Conjunctivae normal.      Pupils: Pupils are equal, round, and reactive to light.   Cardiovascular:      Rate and Rhythm: Normal rate and regular rhythm.      Heart sounds: Normal heart sounds.   Pulmonary:      Effort: Pulmonary effort is normal. No respiratory distress.      Breath sounds: Normal breath sounds.   Abdominal:      General: Bowel sounds are normal.      Palpations: Abdomen is soft.   Musculoskeletal:         General: Normal range of motion.      Cervical back: Normal range of motion.      Comments: Moving all extremities   Right shoulder discomfort- Full ROM- discomfort with movement above 90 degrees  Able to place arms behind back   Frozen shoulder?    Skin:     General: Skin is warm and dry.   Neurological:      General: No focal deficit present.      Mental Status: He is alert and oriented to person, place, and time.   Psychiatric:         Mood and Affect: Mood normal.         Behavior: Behavior normal.         Thought Content: Thought content normal.         Judgment: Judgment normal.         The following data was reviewed by: Isadora Zurita MD on 04/03/2025:           Assessment and Plan Additional age appropriate preventative wellness advice topics were discussed during today's preventative wellness exam(some topics already addressed during AWV portion of the note above):    Physical Activity: Advised cardiovascular activity 150 minutes per week as tolerated. (example brisk walk for 30 minutes, 5 days a week).     Nutrition: Discussed nutrition plan with patient. Information shared in after visit summary. Goal is for a well balanced diet to enhance overall health.     Healthy Weight: Discussed current and goal BMI with patient. Steps to attain this goal discussed. Information shared in after visit summary.     Injury Prevention Discussion:  Information shared in after visit  summary.       Medicare annual wellness visit, subsequent  Diagnosis Discussed   Continue to monitor   Plenty of fluids, monitor diet and exercise   Labs ordered will notify of results   Immunizations up to date   Take medications as instructed- refills today  Follow up with eye doctor for DM eye exam and vision check   Cologuard ordered- screening colon cancer   Follow up as directed   If symptoms worsen or persist please seek further evaluation        Mixed hyperlipidemia       Orders:    rosuvastatin (Crestor) 20 MG tablet; Take 1 tablet by mouth Daily.    CBC (No Diff); Future    Lipid Panel; Future    TSH Rfx On Abnormal To Free T4; Future  restart cholesterol lowering medication   Type 2 diabetes mellitus with hyperglycemia, without long-term current use of insulin      Orders:    glipizide (GLUCOTROL XL) 5 MG ER tablet; Take 3 tablets by mouth Daily.    metFORMIN ER (GLUCOPHAGE-XR) 500 MG 24 hr tablet; Take 2 tablets by mouth 2 (Two) Times a Day.    pioglitazone (Actos) 30 MG tablet; Take 1 tablet by mouth Daily.    CBC (No Diff); Future    Hemoglobin A1c; Future    TSH Rfx On Abnormal To Free T4; Future    Microalbumin / Creatinine Urine Ratio - Urine, Clean Catch; Future  stopped Farxiga for some time   May need addition pending results     Fatty infiltration of liver    Orders:    Comprehensive Metabolic Panel; Future    Elevated lipase  Hx of elevated lipase. Denies abdominal pain or discomfort   Will monitor        Screening for colon cancer    Orders:    Cologuard - Stool, Per Rectum; Future    Acute pain of right shoulder  Suspected frozen shoulder       Screening PSA (prostate specific antigen)    Orders:    PSA Screen; Future    Adhesive capsulitis of right shoulder  Frozen shoulder- will work on stretching and exercise   Advil or aleve as needed for pain   Will continue to monitor   If worsening will get imaging or orthopedics for further evaluation                  Follow Up   Return in about 6  months (around 10/3/2025), or if symptoms worsen or fail to improve, for Recheck- HPL, DM .  Patient was given instructions and counseling regarding his condition or for health maintenance advice. Please see specific information pulled into the AVS if appropriate.

## 2025-04-03 NOTE — PATIENT INSTRUCTIONS
Diagnosis Discussed   Continue to monitor   Plenty of fluids, monitor diet and exercise   Labs ordered will notify of results   Immunizations up to date   Take medications as instructed- refills today  Follow up with eye doctor for DM eye exam and vision check   Cologuard ordered- screening colon cancer   Follow up as directed   If symptoms worsen or persist please seek further evaluation

## 2025-04-03 NOTE — Clinical Note
Please schedule patient for 3 months follow up. HgA1c 10.2.  Added farxiga back to medications- advise work on diet and exercise- will recheck HgA1c in 3 months.

## 2025-04-04 ENCOUNTER — RESULTS FOLLOW-UP (OUTPATIENT)
Dept: INTERNAL MEDICINE | Facility: CLINIC | Age: 65
End: 2025-04-04
Payer: MEDICARE

## 2025-04-04 DIAGNOSIS — E11.65 TYPE 2 DIABETES MELLITUS WITH HYPERGLYCEMIA, WITHOUT LONG-TERM CURRENT USE OF INSULIN: Primary | ICD-10-CM

## 2025-04-04 LAB
ALBUMIN UR-MCNC: <1.2 MG/DL
CREAT UR-MCNC: 60 MG/DL
MICROALBUMIN/CREAT UR: NORMAL MG/G{CREAT}
PSA SERPL-MCNC: 1.38 NG/ML (ref 0–4)
TSH SERPL DL<=0.05 MIU/L-ACNC: 0.87 UIU/ML (ref 0.27–4.2)

## 2025-04-04 RX ORDER — DAPAGLIFLOZIN 10 MG/1
10 TABLET, FILM COATED ORAL DAILY
Qty: 90 TABLET | Refills: 3 | Status: SHIPPED | OUTPATIENT
Start: 2025-04-04

## 2025-07-01 ENCOUNTER — TELEPHONE (OUTPATIENT)
Dept: INTERNAL MEDICINE | Facility: CLINIC | Age: 65
End: 2025-07-01
Payer: MEDICARE

## 2025-07-01 NOTE — TELEPHONE ENCOUNTER
HUB TO RELAY        LEFT VOICEMAIL LETTING PATIENT KNOW THAT THE PROVIDER WILL BE OUT OF THE OFFICE ON 7- AND TO GIVE US A CALL BACK TO RESCHEDULE.

## 2025-07-09 ENCOUNTER — OFFICE VISIT (OUTPATIENT)
Dept: INTERNAL MEDICINE | Facility: CLINIC | Age: 65
End: 2025-07-09
Payer: MEDICARE

## 2025-07-09 VITALS
DIASTOLIC BLOOD PRESSURE: 58 MMHG | SYSTOLIC BLOOD PRESSURE: 98 MMHG | BODY MASS INDEX: 26.33 KG/M2 | WEIGHT: 194.4 LBS | OXYGEN SATURATION: 96 % | HEART RATE: 93 BPM | HEIGHT: 72 IN | RESPIRATION RATE: 16 BRPM

## 2025-07-09 DIAGNOSIS — E11.65 TYPE 2 DIABETES MELLITUS WITH HYPERGLYCEMIA, WITHOUT LONG-TERM CURRENT USE OF INSULIN: Primary | ICD-10-CM

## 2025-07-09 DIAGNOSIS — E78.2 MIXED HYPERLIPIDEMIA: ICD-10-CM

## 2025-07-09 LAB
EXPIRATION DATE: ABNORMAL
HBA1C MFR BLD: 7.4 % (ref 4.5–5.7)
Lab: ABNORMAL

## 2025-07-09 PROCEDURE — 1160F RVW MEDS BY RX/DR IN RCRD: CPT | Performed by: FAMILY MEDICINE

## 2025-07-09 PROCEDURE — 99214 OFFICE O/P EST MOD 30 MIN: CPT | Performed by: FAMILY MEDICINE

## 2025-07-09 PROCEDURE — 3051F HG A1C>EQUAL 7.0%<8.0%: CPT | Performed by: FAMILY MEDICINE

## 2025-07-09 PROCEDURE — 83036 HEMOGLOBIN GLYCOSYLATED A1C: CPT | Performed by: FAMILY MEDICINE

## 2025-07-09 PROCEDURE — 1126F AMNT PAIN NOTED NONE PRSNT: CPT | Performed by: FAMILY MEDICINE

## 2025-07-09 PROCEDURE — 1159F MED LIST DOCD IN RCRD: CPT | Performed by: FAMILY MEDICINE

## 2025-07-09 NOTE — PATIENT INSTRUCTIONS
Diagnosis Discussed   Continue to monitor   Plenty of fluids  Continue current medications as prescribed   A1c greatly improved!!   Increase water intake   Consider multivitamin daily   If symptoms worsen or persist please seek further evaluation

## 2025-08-06 ENCOUNTER — TELEPHONE (OUTPATIENT)
Dept: INTERNAL MEDICINE | Facility: CLINIC | Age: 65
End: 2025-08-06
Payer: MEDICARE

## 2025-08-07 DIAGNOSIS — E78.2 MIXED HYPERLIPIDEMIA: ICD-10-CM

## 2025-08-07 DIAGNOSIS — E11.65 TYPE 2 DIABETES MELLITUS WITH HYPERGLYCEMIA, WITHOUT LONG-TERM CURRENT USE OF INSULIN: ICD-10-CM

## 2025-08-08 RX ORDER — PIOGLITAZONE 30 MG/1
30 TABLET ORAL DAILY
Qty: 90 TABLET | Refills: 1 | OUTPATIENT
Start: 2025-08-08

## 2025-08-08 RX ORDER — ROSUVASTATIN CALCIUM 20 MG/1
20 TABLET, COATED ORAL DAILY
Qty: 90 TABLET | Refills: 1 | OUTPATIENT
Start: 2025-08-08

## 2025-08-08 RX ORDER — METFORMIN HYDROCHLORIDE 500 MG/1
1000 TABLET, EXTENDED RELEASE ORAL 2 TIMES DAILY
Qty: 360 TABLET | Refills: 1 | OUTPATIENT
Start: 2025-08-08